# Patient Record
Sex: MALE | Race: WHITE | NOT HISPANIC OR LATINO
[De-identification: names, ages, dates, MRNs, and addresses within clinical notes are randomized per-mention and may not be internally consistent; named-entity substitution may affect disease eponyms.]

---

## 2019-07-31 ENCOUNTER — TRANSCRIPTION ENCOUNTER (OUTPATIENT)
Age: 69
End: 2019-07-31

## 2019-08-01 ENCOUNTER — OUTPATIENT (OUTPATIENT)
Dept: OUTPATIENT SERVICES | Facility: HOSPITAL | Age: 69
LOS: 1 days | Discharge: HOME | End: 2019-08-01
Payer: COMMERCIAL

## 2019-08-01 DIAGNOSIS — D50.9 IRON DEFICIENCY ANEMIA, UNSPECIFIED: ICD-10-CM

## 2019-08-01 PROCEDURE — 45378 DIAGNOSTIC COLONOSCOPY: CPT | Mod: 52

## 2019-08-13 ENCOUNTER — TRANSCRIPTION ENCOUNTER (OUTPATIENT)
Age: 69
End: 2019-08-13

## 2019-08-14 ENCOUNTER — RESULT REVIEW (OUTPATIENT)
Age: 69
End: 2019-08-14

## 2019-08-14 ENCOUNTER — OUTPATIENT (OUTPATIENT)
Dept: OUTPATIENT SERVICES | Facility: HOSPITAL | Age: 69
LOS: 1 days | End: 2019-08-14
Payer: COMMERCIAL

## 2019-08-14 DIAGNOSIS — D50.9 IRON DEFICIENCY ANEMIA, UNSPECIFIED: ICD-10-CM

## 2019-08-14 PROCEDURE — 45380 COLONOSCOPY AND BIOPSY: CPT

## 2019-08-14 PROCEDURE — 88305 TISSUE EXAM BY PATHOLOGIST: CPT

## 2019-08-14 PROCEDURE — 88305 TISSUE EXAM BY PATHOLOGIST: CPT | Mod: 26

## 2019-08-15 ENCOUNTER — TRANSCRIPTION ENCOUNTER (OUTPATIENT)
Age: 69
End: 2019-08-15

## 2019-08-15 LAB — SURGICAL PATHOLOGY STUDY: SIGNIFICANT CHANGE UP

## 2019-09-04 ENCOUNTER — APPOINTMENT (OUTPATIENT)
Dept: COLORECTAL SURGERY | Facility: CLINIC | Age: 69
End: 2019-09-04
Payer: COMMERCIAL

## 2019-09-04 VITALS
HEART RATE: 56 BPM | RESPIRATION RATE: 12 BRPM | BODY MASS INDEX: 26.33 KG/M2 | DIASTOLIC BLOOD PRESSURE: 70 MMHG | HEIGHT: 65 IN | SYSTOLIC BLOOD PRESSURE: 120 MMHG | WEIGHT: 158 LBS

## 2019-09-04 PROCEDURE — 99203 OFFICE O/P NEW LOW 30 MIN: CPT

## 2019-09-05 NOTE — ASSESSMENT
[FreeTextEntry1] : Pleasant 69-year-old gentleman who presents today for consultation regarding management of a newly found small bowel abnormality.\par \par Patient found to be anemic and this prompted the performance of an attempted colonoscopy. The initial attempt revealed a poor prep and was aborted.  After an appropriate prep a complete colonoscopy was performed revealing no abnormalities. Due to persistence of abdominal pain a CAT scan was performed.  A large circumferential napkin ring type lesion of a loop of small bowel was seen. This seems to be abutting and partially obstructing the left ureter. It is almost 7-8 cm in maximal length. No other abnormalities were noted on the CAT scan. Interestingly, the patient had a negative CAT scan performed within one to one and one half years ago.  Also of note, the patient's father had lymphoma and his mother  of gastric cancer at an early age.\par \par In reviewing these images, I am not sure if this small bowel abnormality is resectable especially with the description of left hydronephrosis/hydroureter.\par \par To obtain further information and possible work-up I will refer him to oncology. Perhaps a core needle biopsy can be performed to help with diagnosis. Regardless, further recommendations will be based upon this assessment.

## 2019-09-05 NOTE — HISTORY OF PRESENT ILLNESS
[FreeTextEntry1] : 68yo WM with routine labwork in May 2019 revealing anemia.  Advised to schedule GI consultation.  August 1, 2019 pt underwent colonoscopy, which was aborted at the left colon due to poor prep.  Colonoscopy was repeated on Aug 14.  Multiple rectal biopsies were taken (erythema noted).  Otherwise colonoscopy was normal.  Path of rectal biopsies were consistent with colitis.\par \par Pt had abdominal discomfort following colonoscopy.  Due to history of anemia pt was to undergo further investigation with CT A&P.  This revealed a SB mass suspicious for neoplasm/left hydroureteronephrosis. Pt presents for surgical consultation.\par \par Of note Jan 2018 pt experienced SBO requiring NGT decompression.

## 2019-09-05 NOTE — PHYSICAL EXAM
[Normal Breath Sounds] : Normal breath sounds [Normal Rate and Rhythm] : normal rate and rhythm [Normal Heart Sounds] : normal heart sounds [No Edema] : No edema [Alert] : alert [Oriented to Person] : oriented to person [Oriented to Time] : oriented to time [Oriented to Place] : oriented to place [Calm] : calm [de-identified] : flat soft +BS mild left sided tenderness ND [de-identified] : NC/AT [de-identified] : +ROM [de-identified] : intact

## 2019-09-05 NOTE — REVIEW OF SYSTEMS
[Feeling Tired] : feeling tired [Negative] : Psychiatric [Chest Pain] : no chest pain [Lower Ext Edema] : no extremity edema [Palpitations] : no palpitations [Shortness Of Breath] : no shortness of breath [Easy Bleeding] : no tendency for easy bleeding [Easy Bruising] : no tendency for easy bruising [FreeTextEntry7] : daily BM [FreeTextEntry5] : 2017 last ST

## 2019-09-11 ENCOUNTER — OUTPATIENT (OUTPATIENT)
Dept: OUTPATIENT SERVICES | Facility: HOSPITAL | Age: 69
LOS: 1 days | Discharge: HOME | End: 2019-09-11

## 2019-09-11 DIAGNOSIS — C85.88 OTHER SPECIFIED TYPES OF NON-HODGKIN LYMPHOMA, LYMPH NODES OF MULTIPLE SITES: ICD-10-CM

## 2019-09-11 DIAGNOSIS — Z51.11 ENCOUNTER FOR ANTINEOPLASTIC CHEMOTHERAPY: ICD-10-CM

## 2019-09-11 DIAGNOSIS — R11.2 NAUSEA WITH VOMITING, UNSPECIFIED: ICD-10-CM

## 2019-09-11 DIAGNOSIS — E86.0 DEHYDRATION: ICD-10-CM

## 2019-09-11 DIAGNOSIS — Z51.89 ENCOUNTER FOR OTHER SPECIFIED AFTERCARE: ICD-10-CM

## 2019-09-12 ENCOUNTER — RESULT REVIEW (OUTPATIENT)
Age: 69
End: 2019-09-12

## 2019-09-12 ENCOUNTER — APPOINTMENT (OUTPATIENT)
Dept: HEMATOLOGY ONCOLOGY | Facility: CLINIC | Age: 69
End: 2019-09-12
Payer: COMMERCIAL

## 2019-09-12 VITALS
OXYGEN SATURATION: 100 % | TEMPERATURE: 98.6 F | SYSTOLIC BLOOD PRESSURE: 159 MMHG | BODY MASS INDEX: 26.35 KG/M2 | HEART RATE: 71 BPM | WEIGHT: 154.32 LBS | DIASTOLIC BLOOD PRESSURE: 74 MMHG | RESPIRATION RATE: 16 BRPM | HEIGHT: 64.29 IN

## 2019-09-12 DIAGNOSIS — K57.30 DIVERTICULOSIS OF LARGE INTESTINE W/OUT PERFORATION OR ABSCESS W/OUT BLEEDING: ICD-10-CM

## 2019-09-12 LAB
ALBUMIN SERPL ELPH-MCNC: 4.4 G/DL
ALP BLD-CCNC: 105 U/L
ALT SERPL-CCNC: 19 U/L
ANION GAP SERPL CALC-SCNC: 13 MMOL/L
AST SERPL-CCNC: 17 U/L
B2 MICROGLOB SERPL-MCNC: 2.3 MG/L
BASOPHILS # BLD AUTO: 0.1 K/UL — SIGNIFICANT CHANGE UP (ref 0–0.2)
BASOPHILS NFR BLD AUTO: 0.8 % — SIGNIFICANT CHANGE UP (ref 0–2)
BILIRUB SERPL-MCNC: 0.2 MG/DL
BUN SERPL-MCNC: 21 MG/DL
CALCIUM SERPL-MCNC: 9.7 MG/DL
CHLORIDE SERPL-SCNC: 102 MMOL/L
CO2 SERPL-SCNC: 26 MMOL/L
CREAT SERPL-MCNC: 1.13 MG/DL
EOSINOPHIL # BLD AUTO: 0.3 K/UL — SIGNIFICANT CHANGE UP (ref 0–0.5)
EOSINOPHIL NFR BLD AUTO: 4.9 % — SIGNIFICANT CHANGE UP (ref 0–6)
FERRITIN SERPL-MCNC: 56 NG/ML
GLUCOSE SERPL-MCNC: 91 MG/DL
HCT VFR BLD CALC: 30.2 % — LOW (ref 39–50)
HGB BLD-MCNC: 9.8 G/DL — LOW (ref 13–17)
IRON SATN MFR SERPL: 6 %
IRON SERPL-MCNC: 20 UG/DL
LDH SERPL-CCNC: 175 U/L
LYMPHOCYTES # BLD AUTO: 0.9 K/UL — LOW (ref 1–3.3)
LYMPHOCYTES # BLD AUTO: 12.2 % — LOW (ref 13–44)
MCHC RBC-ENTMCNC: 25.3 PG — LOW (ref 27–34)
MCHC RBC-ENTMCNC: 32.5 G/DL — SIGNIFICANT CHANGE UP (ref 32–36)
MCV RBC AUTO: 77.6 FL — LOW (ref 80–100)
MONOCYTES # BLD AUTO: 0.6 K/UL — SIGNIFICANT CHANGE UP (ref 0–0.9)
MONOCYTES NFR BLD AUTO: 8.6 % — SIGNIFICANT CHANGE UP (ref 2–14)
NEUTROPHILS # BLD AUTO: 5.2 K/UL — SIGNIFICANT CHANGE UP (ref 1.8–7.4)
NEUTROPHILS NFR BLD AUTO: 73.5 % — SIGNIFICANT CHANGE UP (ref 43–77)
PLATELET # BLD AUTO: 348 K/UL — SIGNIFICANT CHANGE UP (ref 150–400)
POTASSIUM SERPL-SCNC: 4.5 MMOL/L
PROT SERPL-MCNC: 6.8 G/DL
RBC # BLD: 3.89 M/UL — LOW (ref 4.2–5.8)
RBC # FLD: 14.4 % — SIGNIFICANT CHANGE UP (ref 10.3–14.5)
RETICS #: 66.2 K/UL — SIGNIFICANT CHANGE UP (ref 25–125)
RETICS/RBC NFR: 1.7 % — SIGNIFICANT CHANGE UP (ref 0.5–2.5)
SODIUM SERPL-SCNC: 141 MMOL/L
TIBC SERPL-MCNC: 342 UG/DL
UIBC SERPL-MCNC: 322 UG/DL
VIT B12 SERPL-MCNC: 633 PG/ML
WBC # BLD: 7.1 K/UL — SIGNIFICANT CHANGE UP (ref 3.8–10.5)
WBC # FLD AUTO: 7.1 K/UL — SIGNIFICANT CHANGE UP (ref 3.8–10.5)

## 2019-09-12 PROCEDURE — 99205 OFFICE O/P NEW HI 60 MIN: CPT

## 2019-09-12 RX ORDER — POLYETHYLENE GLYCOL-3350 AND ELECTROLYTES 236; 6.74; 5.86; 2.97; 22.74 G/274.31G; G/274.31G; G/274.31G; G/274.31G; G/274.31G
236 POWDER, FOR SOLUTION ORAL
Qty: 4000 | Refills: 0 | Status: DISCONTINUED | COMMUNITY
Start: 2019-08-01

## 2019-09-12 NOTE — REVIEW OF SYSTEMS
[Fatigue] : fatigue [Recent Change In Weight] : ~T recent weight change [Negative] : Allergic/Immunologic [Patient Intake Form Reviewed] : Patient intake form was reviewed [Fever] : no fever [Chills] : no chills [Night Sweats] : no night sweats [Abdominal Pain] : no abdominal pain [Vomiting] : no vomiting [Constipation] : no constipation [Diarrhea] : no diarrhea [Dysuria] : no dysuria [FreeTextEntry7] : GERD symptoms decr by omeprazole [FreeTextEntry2] : 164 lb in 6/19, now 156 lb [FreeTextEntry8] : prostate ca as above

## 2019-09-12 NOTE — PHYSICAL EXAM
[Restricted in physically strenuous activity but ambulatory and able to carry out work of a light or sedentary nature] : Status 1- Restricted in physically strenuous activity but ambulatory and able to carry out work of a light or sedentary nature, e.g., light house work, office work [Normal] : affect appropriate [de-identified] : firm mass palp in LLQ, sl tender   [de-identified] : no CVAT

## 2019-09-12 NOTE — HISTORY OF PRESENT ILLNESS
[Disease:__________________________] : Disease: [unfilled] [de-identified] : Mr. Hopson is a 68 yo WM with a small bowel mass and associated mild hydronephrosis.  He presented with anemia in routine labs i(Hgb 11.8) n May, 2019\par He was admitted to [facility] in 1/2018 with abdominal pain and was found to have an SBO.  CT scan showed SBO with transition in RLQ, no significant adenopathy, small amount of ascites. Sxs and SBO resolved with NGT decompression and has not recurred.  Cologard had been (-)  3/2018.  There was lab evidence of iron deficiency.  Colonoscopy 8/1/19 was aborted due to poor prep.  Repeat on August 14, 2019 showed erythema in rectum and diverticular dz, with rectal bxs showing focal active colitis.   He developed abd pain after the recent colonoscopy and repeat CT scan on 8/28/19 showed a new large apple core mass in lower abd/upper pelvis, 8.6 x 6.8 x 6.2 cm with marked nodular circumferential wall thickening.  Mild left hydroureteronephrosis was seen.  There was no LAD or H/S megaly.  Ascites was not noted. \par He continues to have intermittent post-prandial dull discomfort to the left of umbilicus and extending to LLQ.  He has had no melena, hematochezia or change in bowel habits.  He has had no constitutional symptoms other than unintended 8 pound weight loss in the last few weeks.\par He has a h/o ASHD and had a stent placed in the LAD  in 2013.   Prostate cancer was treated in 2008 with [facility] at [facility] in 2008.\par He has had regular urologic f/u's and he reports that his PSA is undetectable.\par He has DANIEL and uses a dental device; he previously responded for a few years after uvulectomy. [de-identified] : initial office visit

## 2019-09-12 NOTE — CONSULT LETTER
[Dear  ___] : Dear  [unfilled], [Consult Letter:] : I had the pleasure of evaluating your patient, [unfilled]. [Please see my note below.] : Please see my note below. [DrAmanda  ___] : Dr. FAUST [DrAmanda ___] : Dr. FAUST [FreeTextEntry2] : Everette Cunha M.D.

## 2019-09-13 LAB
APTT BLD: 31.3 SEC
HBV CORE IGG+IGM SER QL: NONREACTIVE
HBV SURFACE AB SER QL: NONREACTIVE
HBV SURFACE AG SER QL: NONREACTIVE
HCV AB SER QL: NONREACTIVE
HCV S/CO RATIO: 0.28 S/CO
HIV1+2 AB SPEC QL IA.RAPID: NONREACTIVE
IGA SER QL IEP: 95 MG/DL
IGG SER QL IEP: 604 MG/DL
IGM SER QL IEP: 40 MG/DL
INR PPP: 0.97 RATIO
PT BLD: 11 SEC

## 2019-09-15 ENCOUNTER — FORM ENCOUNTER (OUTPATIENT)
Age: 69
End: 2019-09-15

## 2019-09-16 ENCOUNTER — OUTPATIENT (OUTPATIENT)
Dept: OUTPATIENT SERVICES | Facility: HOSPITAL | Age: 69
LOS: 1 days | End: 2019-09-16

## 2019-09-16 ENCOUNTER — APPOINTMENT (OUTPATIENT)
Dept: NUCLEAR MEDICINE | Facility: CLINIC | Age: 69
End: 2019-09-16
Payer: COMMERCIAL

## 2019-09-16 ENCOUNTER — EMERGENCY (EMERGENCY)
Facility: HOSPITAL | Age: 69
LOS: 1 days | Discharge: HOME | End: 2019-09-16
Attending: EMERGENCY MEDICINE
Payer: COMMERCIAL

## 2019-09-16 VITALS
TEMPERATURE: 99 F | HEART RATE: 75 BPM | SYSTOLIC BLOOD PRESSURE: 130 MMHG | HEIGHT: 65 IN | DIASTOLIC BLOOD PRESSURE: 87 MMHG | RESPIRATION RATE: 18 BRPM | WEIGHT: 154.1 LBS | OXYGEN SATURATION: 99 %

## 2019-09-16 DIAGNOSIS — Z00.8 ENCOUNTER FOR OTHER GENERAL EXAMINATION: ICD-10-CM

## 2019-09-16 PROCEDURE — 93010 ELECTROCARDIOGRAM REPORT: CPT

## 2019-09-16 PROCEDURE — 78815 PET IMAGE W/CT SKULL-THIGH: CPT | Mod: 26,PI

## 2019-09-16 PROCEDURE — 99284 EMERGENCY DEPT VISIT MOD MDM: CPT

## 2019-09-16 RX ORDER — SODIUM CHLORIDE 9 MG/ML
1000 INJECTION INTRAMUSCULAR; INTRAVENOUS; SUBCUTANEOUS ONCE
Refills: 0 | Status: COMPLETED | OUTPATIENT
Start: 2019-09-16 | End: 2019-09-16

## 2019-09-16 RX ORDER — ONDANSETRON 8 MG/1
4 TABLET, FILM COATED ORAL ONCE
Refills: 0 | Status: COMPLETED | OUTPATIENT
Start: 2019-09-16 | End: 2019-09-16

## 2019-09-16 NOTE — ED PROVIDER NOTE - PHYSICAL EXAMINATION
General: In no acute distress, comfortable appearing   HEENT: Normocephalic and atraumatic, Trachea midline.   Cardiac: Normal S1 and S2 w/ RRR. No MRG.  Pulmonary: CTA bilaterally. No increased WOB.   Abdominal: Soft, mild TTP in LLQ, ND  Neurologic: No focal sensory or motor deficits.  Musculoskeletal: No limited ROM.  Vascular: Warm and well perfused  Skin: Color appropriate for race.   Psychiatric: Appropriate mood and affect. No apparent risk to self or others.  Shan Hill, PGY-2

## 2019-09-16 NOTE — ED PROVIDER NOTE - CLINICAL SUMMARY MEDICAL DECISION MAKING FREE TEXT BOX
69M recent dx of lymphoma p/w nausea/vomting. At time of examination sx have resolved and pt reports feeling much better. Abdominal pain is reportedly same location quality of known lymphoma for which pt is currently being worked up for. Will obtain basic labs, UA. Pt is afebrile. Will PO chal and d/c if there are no concerning findings on workup

## 2019-09-16 NOTE — ED PROVIDER NOTE - PATIENT PORTAL LINK FT
You can access the FollowMyHealth Patient Portal offered by [facility] by registering at the following website: [email or web address]. By joining [facility]’s FollowMyHealth portal, you will also be able to view your health information using other applications (apps) compatible with our system.

## 2019-09-16 NOTE — ED ADULT TRIAGE NOTE - CHIEF COMPLAINT QUOTE
pt states, 'I have been vomiting today and I had a PET scan done and after coming home I continued to vomit."

## 2019-09-16 NOTE — ED PROVIDER NOTE - ATTENDING CONTRIBUTION TO CARE
68yo male pmh HTN, HLD, CAD, newly diagnosed lymphoma p/w multiple episodes of vomiting, last episode prior to arrival in ED. Greenish emesis, a/w LLQ abdominal pain although unchanged from his baseline pain in LLQ where lymphoma is located as per pt. Had PET scan today, vomiting prior to PET. Denies dysuria, fevers, diarrhea, rash, travel, sick contacts. Not on chemo yet. EKG with PVC, no STEMI. Pt states he feels better in past 2 hours. LLQ minimally tender to deep palpation but pt states that is baseline x months. IVF, labs with lipase. If pt improves and tolerates PO, will discharge with PCP follow up.

## 2019-09-16 NOTE — ED PROVIDER NOTE - NS ED ROS FT
REVIEW OF SYSTEMS:  General:  no fever, no chills  HEENT: no headache, no vision changes  Cardiac: no chest pain, no palpitations  Respiratory: no cough, no shortness of breath  Gastrointestinal: +abdominal pain, +nausea, +vomiting, no diarrhea  Genitourinary: no hematuria, no dysuria, no urinary frequency, no urinary hesitancy   Extremities: no extremity swelling, no extremity pain  Neuro: no focal weakness, no numbness/tingling of the extremities, no decreased sensation  Heme: no easy bleeding, no easy bruising, no anemia  Skin: no abrasions, no jaundice, no pruritis, no rashes, no lesions  -Shan Hill, PGY-2

## 2019-09-16 NOTE — ED PROVIDER NOTE - NSFOLLOWUPINSTRUCTIONS_ED_ALL_ED_FT
Please follow up with your PMD in the next few days. Please return to the ER if you are having worsening abdominal pain, persistent nausea and vomiting, not being able to tolerate fluids.     Please return to the ER with any questions or concerns.

## 2019-09-16 NOTE — ED PROVIDER NOTE - PROGRESS NOTE DETAILS
Labs unremarkable for acute findings. Pt appears much better after hydration and is tolerating PO fluids/food. Will d/c   Shan Hill, PGY-2

## 2019-09-16 NOTE — ED PROVIDER NOTE - OBJECTIVE STATEMENT
69M PMH CAD w/ 1 stent, HTN, HLD, p/w vomiting. Pt has recent diagnosis of lymphoma in august 2019. This morning pt had some moderate-severe, LLQ abdominal pain, associated with bloating and multiple episodes of NBNB vomiting. Normal BMs, w/ some alleviation of pain. Pt states this is similar location of pain that caused his presentation and ultimate diagnosis of lymphoma (mass found in the region). Pt went in for PET scan this afternoon for staging of lymphoma. Following test had more vomiting and came to ER. States he feels much better at time of examination. Denies fevers/chills, CP/SOB Denies abdominal pain at this time

## 2019-09-16 NOTE — ED ADULT NURSE NOTE - OBJECTIVE STATEMENT
70 yo M pmh of HTN, high cholesterol, prostate CA, recent dx of lymphoma unk type, ambulated to ED c/o n/v, and LLQ abdominal pain starting this morning.  Pt had 3 episodes of emesis, nbnb.  Last episode of 21:00 and felt better since then.  Denies CP, back pain, SOB, fevers/chills, diarrhea, lightheadedness, dizziness, changes in urinary or bowel habits. A&OX4, gross neuro intact, VSS.  Abdomen soft, nondistended, nontender.  Skin w/d/i.  safety and comfort maintained.  Wife present at bedside.  Will continue to monitor.

## 2019-09-17 VITALS — TEMPERATURE: 99 F

## 2019-09-17 LAB
ALBUMIN SERPL ELPH-MCNC: 4.1 G/DL — SIGNIFICANT CHANGE UP (ref 3.3–5)
ALP SERPL-CCNC: 109 U/L — SIGNIFICANT CHANGE UP (ref 40–120)
ALT FLD-CCNC: 14 U/L — SIGNIFICANT CHANGE UP (ref 10–45)
ANION GAP SERPL CALC-SCNC: 15 MMOL/L — SIGNIFICANT CHANGE UP (ref 5–17)
APPEARANCE UR: CLEAR — SIGNIFICANT CHANGE UP
AST SERPL-CCNC: 16 U/L — SIGNIFICANT CHANGE UP (ref 10–40)
BACTERIA # UR AUTO: NEGATIVE — SIGNIFICANT CHANGE UP
BASOPHILS # BLD AUTO: 0 K/UL — SIGNIFICANT CHANGE UP (ref 0–0.2)
BASOPHILS NFR BLD AUTO: 0.3 % — SIGNIFICANT CHANGE UP (ref 0–2)
BILIRUB SERPL-MCNC: 0.5 MG/DL — SIGNIFICANT CHANGE UP (ref 0.2–1.2)
BILIRUB UR-MCNC: ABNORMAL
BUN SERPL-MCNC: 26 MG/DL — HIGH (ref 7–23)
CALCIUM SERPL-MCNC: 9.4 MG/DL — SIGNIFICANT CHANGE UP (ref 8.4–10.5)
CHLORIDE SERPL-SCNC: 99 MMOL/L — SIGNIFICANT CHANGE UP (ref 96–108)
CO2 SERPL-SCNC: 24 MMOL/L — SIGNIFICANT CHANGE UP (ref 22–31)
COLOR SPEC: YELLOW — SIGNIFICANT CHANGE UP
CREAT SERPL-MCNC: 1.14 MG/DL — SIGNIFICANT CHANGE UP (ref 0.5–1.3)
CULTURE RESULTS: SIGNIFICANT CHANGE UP
DIFF PNL FLD: NEGATIVE — SIGNIFICANT CHANGE UP
EOSINOPHIL # BLD AUTO: 0 K/UL — SIGNIFICANT CHANGE UP (ref 0–0.5)
EOSINOPHIL NFR BLD AUTO: 0.5 % — SIGNIFICANT CHANGE UP (ref 0–6)
EPI CELLS # UR: 0 /HPF — SIGNIFICANT CHANGE UP
GLUCOSE SERPL-MCNC: 116 MG/DL — HIGH (ref 70–99)
GLUCOSE UR QL: NEGATIVE — SIGNIFICANT CHANGE UP
HCT VFR BLD CALC: 31.1 % — LOW (ref 39–50)
HGB BLD-MCNC: 10.1 G/DL — LOW (ref 13–17)
HYALINE CASTS # UR AUTO: 1 /LPF — SIGNIFICANT CHANGE UP (ref 0–2)
KETONES UR-MCNC: ABNORMAL
LEUKOCYTE ESTERASE UR-ACNC: NEGATIVE — SIGNIFICANT CHANGE UP
LIDOCAIN IGE QN: 19 U/L — SIGNIFICANT CHANGE UP (ref 7–60)
LYMPHOCYTES # BLD AUTO: 0.5 K/UL — LOW (ref 1–3.3)
LYMPHOCYTES # BLD AUTO: 6.8 % — LOW (ref 13–44)
MCHC RBC-ENTMCNC: 25.1 PG — LOW (ref 27–34)
MCHC RBC-ENTMCNC: 32.4 GM/DL — SIGNIFICANT CHANGE UP (ref 32–36)
MCV RBC AUTO: 77.7 FL — LOW (ref 80–100)
MONOCYTES # BLD AUTO: 0.7 K/UL — SIGNIFICANT CHANGE UP (ref 0–0.9)
MONOCYTES NFR BLD AUTO: 9.7 % — SIGNIFICANT CHANGE UP (ref 2–14)
NEUTROPHILS # BLD AUTO: 5.8 K/UL — SIGNIFICANT CHANGE UP (ref 1.8–7.4)
NEUTROPHILS NFR BLD AUTO: 82.6 % — HIGH (ref 43–77)
NITRITE UR-MCNC: NEGATIVE — SIGNIFICANT CHANGE UP
PH UR: 5.5 — SIGNIFICANT CHANGE UP (ref 5–8)
PLATELET # BLD AUTO: 384 K/UL — SIGNIFICANT CHANGE UP (ref 150–400)
POTASSIUM SERPL-MCNC: 3.9 MMOL/L — SIGNIFICANT CHANGE UP (ref 3.5–5.3)
POTASSIUM SERPL-SCNC: 3.9 MMOL/L — SIGNIFICANT CHANGE UP (ref 3.5–5.3)
PROT SERPL-MCNC: 6.9 G/DL — SIGNIFICANT CHANGE UP (ref 6–8.3)
PROT UR-MCNC: ABNORMAL
RBC # BLD: 4 M/UL — LOW (ref 4.2–5.8)
RBC # FLD: 14.7 % — HIGH (ref 10.3–14.5)
RBC CASTS # UR COMP ASSIST: 1 /HPF — SIGNIFICANT CHANGE UP (ref 0–4)
SODIUM SERPL-SCNC: 138 MMOL/L — SIGNIFICANT CHANGE UP (ref 135–145)
SP GR SPEC: 1.03 — HIGH (ref 1.01–1.02)
SPECIMEN SOURCE: SIGNIFICANT CHANGE UP
UROBILINOGEN FLD QL: NEGATIVE — SIGNIFICANT CHANGE UP
WBC # BLD: 7 K/UL — SIGNIFICANT CHANGE UP (ref 3.8–10.5)
WBC # FLD AUTO: 7 K/UL — SIGNIFICANT CHANGE UP (ref 3.8–10.5)
WBC UR QL: 1 /HPF — SIGNIFICANT CHANGE UP (ref 0–5)

## 2019-09-17 PROCEDURE — 81001 URINALYSIS AUTO W/SCOPE: CPT

## 2019-09-17 PROCEDURE — 80053 COMPREHEN METABOLIC PANEL: CPT

## 2019-09-17 PROCEDURE — 99283 EMERGENCY DEPT VISIT LOW MDM: CPT

## 2019-09-17 PROCEDURE — 83690 ASSAY OF LIPASE: CPT

## 2019-09-17 PROCEDURE — 85027 COMPLETE CBC AUTOMATED: CPT

## 2019-09-17 PROCEDURE — 87086 URINE CULTURE/COLONY COUNT: CPT

## 2019-09-17 PROCEDURE — 93005 ELECTROCARDIOGRAM TRACING: CPT

## 2019-09-17 RX ADMIN — SODIUM CHLORIDE 1000 MILLILITER(S): 9 INJECTION INTRAMUSCULAR; INTRAVENOUS; SUBCUTANEOUS at 00:09

## 2019-09-17 NOTE — ED ADULT NURSE REASSESSMENT NOTE - NS ED NURSE REASSESS COMMENT FT1
pt PO challenged as per MD Hernandez request.  Pt given ginger ale and crackers.  Safety and comfort maintained.  Will continue to monitor.

## 2019-09-19 LAB
ALBUMIN MFR SERPL ELPH: 57 %
ALBUMIN SERPL-MCNC: 3.9 G/DL
ALBUMIN/GLOB SERPL: 1.3 RATIO
ALPHA1 GLOB MFR SERPL ELPH: 7.1 %
ALPHA1 GLOB SERPL ELPH-MCNC: 0.5 G/DL
ALPHA2 GLOB MFR SERPL ELPH: 14.7 %
ALPHA2 GLOB SERPL ELPH-MCNC: 1 G/DL
B-GLOBULIN MFR SERPL ELPH: 12.7 %
B-GLOBULIN SERPL ELPH-MCNC: 0.9 G/DL
GAMMA GLOB FLD ELPH-MCNC: 0.6 G/DL
GAMMA GLOB MFR SERPL ELPH: 8.5 %
INTERPRETATION SERPL IEP-IMP: NORMAL
PROT SERPL-MCNC: 6.8 G/DL
PROT SERPL-MCNC: 6.8 G/DL

## 2019-09-22 ENCOUNTER — FORM ENCOUNTER (OUTPATIENT)
Age: 69
End: 2019-09-22

## 2019-09-23 ENCOUNTER — RESULT REVIEW (OUTPATIENT)
Age: 69
End: 2019-09-23

## 2019-09-23 ENCOUNTER — APPOINTMENT (OUTPATIENT)
Dept: ULTRASOUND IMAGING | Facility: IMAGING CENTER | Age: 69
End: 2019-09-23
Payer: COMMERCIAL

## 2019-09-23 ENCOUNTER — OUTPATIENT (OUTPATIENT)
Dept: OUTPATIENT SERVICES | Facility: HOSPITAL | Age: 69
LOS: 1 days | End: 2019-09-23
Payer: COMMERCIAL

## 2019-09-23 DIAGNOSIS — K63.9 DISEASE OF INTESTINE, UNSPECIFIED: ICD-10-CM

## 2019-09-23 PROCEDURE — 88305 TISSUE EXAM BY PATHOLOGIST: CPT

## 2019-09-23 PROCEDURE — 88365 INSITU HYBRIDIZATION (FISH): CPT | Mod: 26

## 2019-09-23 PROCEDURE — 88342 IMHCHEM/IMCYTCHM 1ST ANTB: CPT

## 2019-09-23 PROCEDURE — 88360 TUMOR IMMUNOHISTOCHEM/MANUAL: CPT | Mod: 26

## 2019-09-23 PROCEDURE — 88341 IMHCHEM/IMCYTCHM EA ADD ANTB: CPT

## 2019-09-23 PROCEDURE — 88305 TISSUE EXAM BY PATHOLOGIST: CPT | Mod: 26

## 2019-09-23 PROCEDURE — 20206 BIOPSY MUSCLE PERQ NEEDLE: CPT

## 2019-09-23 PROCEDURE — 76942 ECHO GUIDE FOR BIOPSY: CPT | Mod: 26

## 2019-09-23 PROCEDURE — 88341 IMHCHEM/IMCYTCHM EA ADD ANTB: CPT | Mod: 26,59

## 2019-09-23 PROCEDURE — 76942 ECHO GUIDE FOR BIOPSY: CPT

## 2019-09-23 PROCEDURE — 88360 TUMOR IMMUNOHISTOCHEM/MANUAL: CPT

## 2019-09-23 PROCEDURE — 88342 IMHCHEM/IMCYTCHM 1ST ANTB: CPT | Mod: 26,59

## 2019-09-23 PROCEDURE — 88365 INSITU HYBRIDIZATION (FISH): CPT

## 2019-09-27 LAB — SURGICAL PATHOLOGY STUDY: SIGNIFICANT CHANGE UP

## 2019-09-30 ENCOUNTER — OTHER (OUTPATIENT)
Age: 69
End: 2019-09-30

## 2019-10-01 ENCOUNTER — OUTPATIENT (OUTPATIENT)
Dept: OUTPATIENT SERVICES | Facility: HOSPITAL | Age: 69
LOS: 1 days | End: 2019-10-01
Payer: COMMERCIAL

## 2019-10-01 ENCOUNTER — FORM ENCOUNTER (OUTPATIENT)
Age: 69
End: 2019-10-01

## 2019-10-01 DIAGNOSIS — C85.88 OTHER SPECIFIED TYPES OF NON-HODGKIN LYMPHOMA, LYMPH NODES OF MULTIPLE SITES: ICD-10-CM

## 2019-10-02 ENCOUNTER — RESULT REVIEW (OUTPATIENT)
Age: 69
End: 2019-10-02

## 2019-10-02 ENCOUNTER — OUTPATIENT (OUTPATIENT)
Dept: OUTPATIENT SERVICES | Facility: HOSPITAL | Age: 69
LOS: 1 days | End: 2019-10-02
Payer: COMMERCIAL

## 2019-10-02 ENCOUNTER — APPOINTMENT (OUTPATIENT)
Dept: NUCLEAR MEDICINE | Facility: IMAGING CENTER | Age: 69
End: 2019-10-02
Payer: COMMERCIAL

## 2019-10-02 ENCOUNTER — APPOINTMENT (OUTPATIENT)
Dept: HEMATOLOGY ONCOLOGY | Facility: CLINIC | Age: 69
End: 2019-10-02
Payer: COMMERCIAL

## 2019-10-02 VITALS
BODY MASS INDEX: 25.88 KG/M2 | HEART RATE: 54 BPM | DIASTOLIC BLOOD PRESSURE: 79 MMHG | WEIGHT: 152.12 LBS | RESPIRATION RATE: 16 BRPM | OXYGEN SATURATION: 98 % | TEMPERATURE: 98.3 F | SYSTOLIC BLOOD PRESSURE: 164 MMHG

## 2019-10-02 DIAGNOSIS — C83.30 DIFFUSE LARGE B-CELL LYMPHOMA, UNSPECIFIED SITE: ICD-10-CM

## 2019-10-02 LAB
BASOPHILS # BLD AUTO: 0 K/UL — SIGNIFICANT CHANGE UP (ref 0–0.2)
BASOPHILS NFR BLD AUTO: 0.3 % — SIGNIFICANT CHANGE UP (ref 0–2)
EOSINOPHIL # BLD AUTO: 0.3 K/UL — SIGNIFICANT CHANGE UP (ref 0–0.5)
EOSINOPHIL NFR BLD AUTO: 3.7 % — SIGNIFICANT CHANGE UP (ref 0–6)
HCT VFR BLD CALC: 32.2 % — LOW (ref 39–50)
HGB BLD-MCNC: 10.3 G/DL — LOW (ref 13–17)
LYMPHOCYTES # BLD AUTO: 0.9 K/UL — LOW (ref 1–3.3)
LYMPHOCYTES # BLD AUTO: 12.9 % — LOW (ref 13–44)
MCHC RBC-ENTMCNC: 25 PG — LOW (ref 27–34)
MCHC RBC-ENTMCNC: 32.1 G/DL — SIGNIFICANT CHANGE UP (ref 32–36)
MCV RBC AUTO: 78 FL — LOW (ref 80–100)
MONOCYTES # BLD AUTO: 0.7 K/UL — SIGNIFICANT CHANGE UP (ref 0–0.9)
MONOCYTES NFR BLD AUTO: 10.3 % — SIGNIFICANT CHANGE UP (ref 2–14)
NEUTROPHILS # BLD AUTO: 5.2 K/UL — SIGNIFICANT CHANGE UP (ref 1.8–7.4)
NEUTROPHILS NFR BLD AUTO: 72.8 % — SIGNIFICANT CHANGE UP (ref 43–77)
PLATELET # BLD AUTO: 394 K/UL — SIGNIFICANT CHANGE UP (ref 150–400)
RBC # BLD: 4.12 M/UL — LOW (ref 4.2–5.8)
RBC # FLD: 15.7 % — HIGH (ref 10.3–14.5)
WBC # BLD: 7.2 K/UL — SIGNIFICANT CHANGE UP (ref 3.8–10.5)
WBC # FLD AUTO: 7.2 K/UL — SIGNIFICANT CHANGE UP (ref 3.8–10.5)

## 2019-10-02 PROCEDURE — 88305 TISSUE EXAM BY PATHOLOGIST: CPT | Mod: 26

## 2019-10-02 PROCEDURE — 88313 SPECIAL STAINS GROUP 2: CPT

## 2019-10-02 PROCEDURE — 78472 GATED HEART PLANAR SINGLE: CPT

## 2019-10-02 PROCEDURE — 88313 SPECIAL STAINS GROUP 2: CPT | Mod: 26

## 2019-10-02 PROCEDURE — 88184 FLOWCYTOMETRY/ TC 1 MARKER: CPT

## 2019-10-02 PROCEDURE — 88189 FLOWCYTOMETRY/READ 16 & >: CPT

## 2019-10-02 PROCEDURE — 85097 BONE MARROW INTERPRETATION: CPT

## 2019-10-02 PROCEDURE — 88285 CHROMOSOME COUNT ADDITIONAL: CPT

## 2019-10-02 PROCEDURE — 38222 DX BONE MARROW BX & ASPIR: CPT | Mod: RT

## 2019-10-02 PROCEDURE — 88185 FLOWCYTOMETRY/TC ADD-ON: CPT

## 2019-10-02 PROCEDURE — 87205 SMEAR GRAM STAIN: CPT

## 2019-10-02 PROCEDURE — 88264 CHROMOSOME ANALYSIS 20-25: CPT

## 2019-10-02 PROCEDURE — 78472 GATED HEART PLANAR SINGLE: CPT | Mod: 26

## 2019-10-02 PROCEDURE — 88280 CHROMOSOME KARYOTYPE STUDY: CPT

## 2019-10-02 PROCEDURE — A9538: CPT

## 2019-10-02 PROCEDURE — 88305 TISSUE EXAM BY PATHOLOGIST: CPT

## 2019-10-02 PROCEDURE — 88237 TISSUE CULTURE BONE MARROW: CPT

## 2019-10-02 RX ORDER — OMEPRAZOLE 40 MG/1
40 CAPSULE, DELAYED RELEASE ORAL
Refills: 0 | Status: ACTIVE | COMMUNITY

## 2019-10-02 RX ORDER — LOSARTAN POTASSIUM 100 MG/1
100 TABLET, FILM COATED ORAL DAILY
Refills: 0 | Status: ACTIVE | COMMUNITY

## 2019-10-02 RX ORDER — ATORVASTATIN CALCIUM 40 MG/1
40 TABLET, FILM COATED ORAL DAILY
Refills: 0 | Status: ACTIVE | COMMUNITY

## 2019-10-02 RX ORDER — ASPIRIN 81 MG
81 TABLET, DELAYED RELEASE (ENTERIC COATED) ORAL DAILY
Refills: 0 | Status: ACTIVE | COMMUNITY

## 2019-10-02 RX ORDER — METOPROLOL SUCCINATE 50 MG/1
50 TABLET, EXTENDED RELEASE ORAL DAILY
Refills: 0 | Status: ACTIVE | COMMUNITY

## 2019-10-04 LAB
HEMATOPATHOLOGY REPORT: SIGNIFICANT CHANGE UP
TM INTERPRETATION: SIGNIFICANT CHANGE UP

## 2019-10-06 ENCOUNTER — RESULT REVIEW (OUTPATIENT)
Age: 69
End: 2019-10-06

## 2019-10-09 ENCOUNTER — RX RENEWAL (OUTPATIENT)
Age: 69
End: 2019-10-09

## 2019-10-10 ENCOUNTER — LABORATORY RESULT (OUTPATIENT)
Age: 69
End: 2019-10-10

## 2019-10-10 ENCOUNTER — RESULT REVIEW (OUTPATIENT)
Age: 69
End: 2019-10-10

## 2019-10-10 ENCOUNTER — APPOINTMENT (OUTPATIENT)
Dept: INFUSION THERAPY | Facility: HOSPITAL | Age: 69
End: 2019-10-10

## 2019-10-10 LAB
BASOPHILS # BLD AUTO: 0 K/UL — SIGNIFICANT CHANGE UP (ref 0–0.2)
BASOPHILS NFR BLD AUTO: 0.5 % — SIGNIFICANT CHANGE UP (ref 0–2)
EOSINOPHIL # BLD AUTO: 0.3 K/UL — SIGNIFICANT CHANGE UP (ref 0–0.5)
EOSINOPHIL NFR BLD AUTO: 4.6 % — SIGNIFICANT CHANGE UP (ref 0–6)
HCT VFR BLD CALC: 31.1 % — LOW (ref 39–50)
HGB BLD-MCNC: 9.2 G/DL — LOW (ref 13–17)
LYMPHOCYTES # BLD AUTO: 0.7 K/UL — LOW (ref 1–3.3)
LYMPHOCYTES # BLD AUTO: 10.1 % — LOW (ref 13–44)
MCHC RBC-ENTMCNC: 22.9 PG — LOW (ref 27–34)
MCHC RBC-ENTMCNC: 29.7 G/DL — LOW (ref 32–36)
MCV RBC AUTO: 77.1 FL — LOW (ref 80–100)
MONOCYTES # BLD AUTO: 0.9 K/UL — SIGNIFICANT CHANGE UP (ref 0–0.9)
MONOCYTES NFR BLD AUTO: 12.1 % — SIGNIFICANT CHANGE UP (ref 2–14)
NEUTROPHILS # BLD AUTO: 5.3 K/UL — SIGNIFICANT CHANGE UP (ref 1.8–7.4)
NEUTROPHILS NFR BLD AUTO: 72.6 % — SIGNIFICANT CHANGE UP (ref 43–77)
PLATELET # BLD AUTO: 343 K/UL — SIGNIFICANT CHANGE UP (ref 150–400)
RBC # BLD: 4.03 M/UL — LOW (ref 4.2–5.8)
RBC # FLD: 14.6 % — HIGH (ref 10.3–14.5)
WBC # BLD: 7.2 K/UL — SIGNIFICANT CHANGE UP (ref 3.8–10.5)
WBC # FLD AUTO: 7.2 K/UL — SIGNIFICANT CHANGE UP (ref 3.8–10.5)

## 2019-10-12 ENCOUNTER — OUTPATIENT (OUTPATIENT)
Dept: OUTPATIENT SERVICES | Facility: HOSPITAL | Age: 69
LOS: 1 days | Discharge: HOME | End: 2019-10-12

## 2019-10-12 DIAGNOSIS — C85.88 OTHER SPECIFIED TYPES OF NON-HODGKIN LYMPHOMA, LYMPH NODES OF MULTIPLE SITES: ICD-10-CM

## 2019-10-14 ENCOUNTER — APPOINTMENT (OUTPATIENT)
Dept: INFUSION THERAPY | Facility: HOSPITAL | Age: 69
End: 2019-10-14

## 2019-10-14 ENCOUNTER — LABORATORY RESULT (OUTPATIENT)
Age: 69
End: 2019-10-14

## 2019-10-14 LAB — CHROM ANALY OVERALL INTERP SPEC-IMP: SIGNIFICANT CHANGE UP

## 2019-10-15 DIAGNOSIS — D50.9 IRON DEFICIENCY ANEMIA, UNSPECIFIED: ICD-10-CM

## 2019-10-17 ENCOUNTER — APPOINTMENT (OUTPATIENT)
Dept: HEMATOLOGY ONCOLOGY | Facility: CLINIC | Age: 69
End: 2019-10-17
Payer: COMMERCIAL

## 2019-10-17 ENCOUNTER — APPOINTMENT (OUTPATIENT)
Dept: INFUSION THERAPY | Facility: HOSPITAL | Age: 69
End: 2019-10-17

## 2019-10-17 ENCOUNTER — RESULT REVIEW (OUTPATIENT)
Age: 69
End: 2019-10-17

## 2019-10-17 ENCOUNTER — INPATIENT (INPATIENT)
Facility: HOSPITAL | Age: 69
LOS: 2 days | Discharge: HOME | DRG: 871 | End: 2019-10-20
Attending: HOSPITALIST | Admitting: STUDENT IN AN ORGANIZED HEALTH CARE EDUCATION/TRAINING PROGRAM
Payer: COMMERCIAL

## 2019-10-17 VITALS
TEMPERATURE: 102 F | WEIGHT: 149.91 LBS | OXYGEN SATURATION: 97 % | HEART RATE: 92 BPM | HEIGHT: 64 IN | RESPIRATION RATE: 16 BRPM | SYSTOLIC BLOOD PRESSURE: 149 MMHG | DIASTOLIC BLOOD PRESSURE: 75 MMHG

## 2019-10-17 VITALS
RESPIRATION RATE: 17 BRPM | OXYGEN SATURATION: 99 % | TEMPERATURE: 99.2 F | DIASTOLIC BLOOD PRESSURE: 75 MMHG | WEIGHT: 151.02 LBS | BODY MASS INDEX: 25.69 KG/M2 | HEART RATE: 56 BPM | SYSTOLIC BLOOD PRESSURE: 118 MMHG

## 2019-10-17 DIAGNOSIS — D70.9 NEUTROPENIA, UNSPECIFIED: ICD-10-CM

## 2019-10-17 LAB
ALBUMIN SERPL ELPH-MCNC: 3.2 G/DL — LOW (ref 3.3–5)
ALBUMIN SERPL ELPH-MCNC: 3.9 G/DL — SIGNIFICANT CHANGE UP (ref 3.3–5)
ALP SERPL-CCNC: 106 U/L — SIGNIFICANT CHANGE UP (ref 40–120)
ALP SERPL-CCNC: 86 U/L — SIGNIFICANT CHANGE UP (ref 40–120)
ALT FLD-CCNC: 10 U/L — SIGNIFICANT CHANGE UP (ref 10–45)
ALT FLD-CCNC: 12 U/L — SIGNIFICANT CHANGE UP (ref 10–45)
ANION GAP SERPL CALC-SCNC: 14 MMOL/L — SIGNIFICANT CHANGE UP (ref 5–17)
ANION GAP SERPL CALC-SCNC: 15 MMOL/L — SIGNIFICANT CHANGE UP (ref 5–17)
APPEARANCE UR: CLEAR — SIGNIFICANT CHANGE UP
APTT BLD: 27.8 SEC
APTT BLD: 27.9 SEC — SIGNIFICANT CHANGE UP (ref 27.5–36.3)
AST SERPL-CCNC: 6 U/L — LOW (ref 10–40)
AST SERPL-CCNC: 7 U/L — LOW (ref 10–40)
BILIRUB SERPL-MCNC: 0.5 MG/DL — SIGNIFICANT CHANGE UP (ref 0.2–1.2)
BILIRUB SERPL-MCNC: 0.6 MG/DL — SIGNIFICANT CHANGE UP (ref 0.2–1.2)
BILIRUB UR-MCNC: NEGATIVE — SIGNIFICANT CHANGE UP
BUN SERPL-MCNC: 16 MG/DL — SIGNIFICANT CHANGE UP (ref 7–23)
BUN SERPL-MCNC: 17 MG/DL — SIGNIFICANT CHANGE UP (ref 7–23)
CALCIUM SERPL-MCNC: 8.4 MG/DL — SIGNIFICANT CHANGE UP (ref 8.4–10.5)
CALCIUM SERPL-MCNC: 9.1 MG/DL — SIGNIFICANT CHANGE UP (ref 8.4–10.5)
CHLORIDE SERPL-SCNC: 96 MMOL/L — SIGNIFICANT CHANGE UP (ref 96–108)
CHLORIDE SERPL-SCNC: 96 MMOL/L — SIGNIFICANT CHANGE UP (ref 96–108)
CO2 SERPL-SCNC: 22 MMOL/L — SIGNIFICANT CHANGE UP (ref 22–31)
CO2 SERPL-SCNC: 24 MMOL/L — SIGNIFICANT CHANGE UP (ref 22–31)
COLOR SPEC: SIGNIFICANT CHANGE UP
CREAT SERPL-MCNC: 0.94 MG/DL — SIGNIFICANT CHANGE UP (ref 0.5–1.3)
CREAT SERPL-MCNC: 1.09 MG/DL — SIGNIFICANT CHANGE UP (ref 0.5–1.3)
DIFF PNL FLD: NEGATIVE — SIGNIFICANT CHANGE UP
GAS PNL BLDV: SIGNIFICANT CHANGE UP
GLUCOSE SERPL-MCNC: 106 MG/DL — HIGH (ref 70–99)
GLUCOSE SERPL-MCNC: 126 MG/DL — HIGH (ref 70–99)
GLUCOSE UR QL: NEGATIVE — SIGNIFICANT CHANGE UP
HCT VFR BLD CALC: 25.1 % — LOW (ref 39–50)
HCT VFR BLD CALC: 31.5 % — LOW (ref 39–50)
HGB BLD-MCNC: 8.2 G/DL — LOW (ref 13–17)
HGB BLD-MCNC: 9.8 G/DL — LOW (ref 13–17)
INR BLD: 1.25 RATIO — HIGH (ref 0.88–1.16)
INR PPP: 1.16 RATIO
KETONES UR-MCNC: NEGATIVE — SIGNIFICANT CHANGE UP
LDH SERPL L TO P-CCNC: 121 U/L — SIGNIFICANT CHANGE UP (ref 50–242)
LEUKOCYTE ESTERASE UR-ACNC: NEGATIVE — SIGNIFICANT CHANGE UP
MCHC RBC-ENTMCNC: 24 PG — LOW (ref 27–34)
MCHC RBC-ENTMCNC: 24.2 PG — LOW (ref 27–34)
MCHC RBC-ENTMCNC: 31.1 G/DL — LOW (ref 32–36)
MCHC RBC-ENTMCNC: 32.7 GM/DL — SIGNIFICANT CHANGE UP (ref 32–36)
MCV RBC AUTO: 73.4 FL — LOW (ref 80–100)
MCV RBC AUTO: 77.9 FL — LOW (ref 80–100)
NITRITE UR-MCNC: NEGATIVE — SIGNIFICANT CHANGE UP
PH UR: 6.5 — SIGNIFICANT CHANGE UP (ref 5–8)
PLATELET # BLD AUTO: 143 K/UL — LOW (ref 150–400)
PLATELET # BLD AUTO: 153 K/UL — SIGNIFICANT CHANGE UP (ref 150–400)
POTASSIUM SERPL-MCNC: 4.1 MMOL/L — SIGNIFICANT CHANGE UP (ref 3.5–5.3)
POTASSIUM SERPL-MCNC: 4.5 MMOL/L — SIGNIFICANT CHANGE UP (ref 3.5–5.3)
POTASSIUM SERPL-SCNC: 4.1 MMOL/L — SIGNIFICANT CHANGE UP (ref 3.5–5.3)
POTASSIUM SERPL-SCNC: 4.5 MMOL/L — SIGNIFICANT CHANGE UP (ref 3.5–5.3)
PROT SERPL-MCNC: 5.7 G/DL — LOW (ref 6–8.3)
PROT SERPL-MCNC: 6.1 G/DL — SIGNIFICANT CHANGE UP (ref 6–8.3)
PROT UR-MCNC: SIGNIFICANT CHANGE UP
PROTHROM AB SERPL-ACNC: 14.4 SEC — HIGH (ref 10–12.9)
PT BLD: 13.3 SEC
RAPID RVP RESULT: DETECTED
RBC # BLD: 3.42 M/UL — LOW (ref 4.2–5.8)
RBC # BLD: 4.04 M/UL — LOW (ref 4.2–5.8)
RBC # FLD: 15.5 % — HIGH (ref 10.3–14.5)
RBC # FLD: 15.8 % — HIGH (ref 10.3–14.5)
RV+EV RNA SPEC QL NAA+PROBE: DETECTED
SODIUM SERPL-SCNC: 132 MMOL/L — LOW (ref 135–145)
SODIUM SERPL-SCNC: 135 MMOL/L — SIGNIFICANT CHANGE UP (ref 135–145)
SP GR SPEC: 1.02 — SIGNIFICANT CHANGE UP (ref 1.01–1.02)
UROBILINOGEN FLD QL: NEGATIVE — SIGNIFICANT CHANGE UP
WBC # BLD: 0.2 K/UL — CRITICAL LOW (ref 3.8–10.5)
WBC # BLD: 0.3 K/UL — CRITICAL LOW (ref 3.8–10.5)
WBC # FLD AUTO: 0.2 K/UL — CRITICAL LOW (ref 3.8–10.5)
WBC # FLD AUTO: 0.3 K/UL — CRITICAL LOW (ref 3.8–10.5)

## 2019-10-17 PROCEDURE — 99223 1ST HOSP IP/OBS HIGH 75: CPT | Mod: GC

## 2019-10-17 PROCEDURE — 71045 X-RAY EXAM CHEST 1 VIEW: CPT | Mod: 26

## 2019-10-17 PROCEDURE — 99215 OFFICE O/P EST HI 40 MIN: CPT

## 2019-10-17 PROCEDURE — 99285 EMERGENCY DEPT VISIT HI MDM: CPT

## 2019-10-17 RX ORDER — METOPROLOL TARTRATE 50 MG
50 TABLET ORAL
Refills: 0 | Status: DISCONTINUED | OUTPATIENT
Start: 2019-10-17 | End: 2019-10-20

## 2019-10-17 RX ORDER — ALLOPURINOL 300 MG
300 TABLET ORAL DAILY
Refills: 0 | Status: DISCONTINUED | OUTPATIENT
Start: 2019-10-17 | End: 2019-10-20

## 2019-10-17 RX ORDER — PSYLLIUM HUSK 0.4 G
CAPSULE ORAL
Refills: 0 | Status: DISCONTINUED | COMMUNITY
End: 2019-10-17

## 2019-10-17 RX ORDER — IBUPROFEN 200 MG
600 TABLET ORAL ONCE
Refills: 0 | Status: COMPLETED | OUTPATIENT
Start: 2019-10-17 | End: 2019-10-17

## 2019-10-17 RX ORDER — ATORVASTATIN CALCIUM 80 MG/1
40 TABLET, FILM COATED ORAL AT BEDTIME
Refills: 0 | Status: DISCONTINUED | OUTPATIENT
Start: 2019-10-17 | End: 2019-10-20

## 2019-10-17 RX ORDER — PANTOPRAZOLE SODIUM 20 MG/1
40 TABLET, DELAYED RELEASE ORAL
Refills: 0 | Status: DISCONTINUED | OUTPATIENT
Start: 2019-10-17 | End: 2019-10-20

## 2019-10-17 RX ORDER — LOSARTAN POTASSIUM 100 MG/1
100 TABLET, FILM COATED ORAL DAILY
Refills: 0 | Status: DISCONTINUED | OUTPATIENT
Start: 2019-10-17 | End: 2019-10-18

## 2019-10-17 RX ORDER — METOCLOPRAMIDE HCL 10 MG
5 TABLET ORAL
Refills: 0 | Status: DISCONTINUED | OUTPATIENT
Start: 2019-10-17 | End: 2019-10-20

## 2019-10-17 RX ORDER — ASPIRIN/CALCIUM CARB/MAGNESIUM 324 MG
81 TABLET ORAL DAILY
Refills: 0 | Status: DISCONTINUED | OUTPATIENT
Start: 2019-10-17 | End: 2019-10-20

## 2019-10-17 RX ORDER — FERROUS SULFATE 325(65) MG
324 TABLET ORAL
Refills: 0 | Status: COMPLETED | COMMUNITY
End: 2019-10-17

## 2019-10-17 RX ORDER — FERROUS SULFATE 325(65) MG
325 (65 FE) TABLET ORAL
Refills: 0 | Status: COMPLETED | COMMUNITY
End: 2019-10-17

## 2019-10-17 RX ORDER — CHLORHEXIDINE GLUCONATE 4 %
325 (65 FE) LIQUID (ML) TOPICAL
Refills: 0 | Status: COMPLETED | COMMUNITY
End: 2019-10-17

## 2019-10-17 RX ORDER — ACETAMINOPHEN 500 MG
650 TABLET ORAL ONCE
Refills: 0 | Status: COMPLETED | OUTPATIENT
Start: 2019-10-17 | End: 2019-10-17

## 2019-10-17 RX ORDER — LEVOTHYROXINE SODIUM 125 MCG
100 TABLET ORAL DAILY
Refills: 0 | Status: DISCONTINUED | OUTPATIENT
Start: 2019-10-17 | End: 2019-10-20

## 2019-10-17 RX ORDER — CEFEPIME 1 G/1
2000 INJECTION, POWDER, FOR SOLUTION INTRAMUSCULAR; INTRAVENOUS ONCE
Refills: 0 | Status: COMPLETED | OUTPATIENT
Start: 2019-10-17 | End: 2019-10-17

## 2019-10-17 RX ORDER — SODIUM CHLORIDE 9 MG/ML
2100 INJECTION INTRAMUSCULAR; INTRAVENOUS; SUBCUTANEOUS ONCE
Refills: 0 | Status: COMPLETED | OUTPATIENT
Start: 2019-10-17 | End: 2019-10-17

## 2019-10-17 RX ORDER — VANCOMYCIN HCL 1 G
1000 VIAL (EA) INTRAVENOUS ONCE
Refills: 0 | Status: COMPLETED | OUTPATIENT
Start: 2019-10-17 | End: 2019-10-17

## 2019-10-17 RX ORDER — INFLUENZA VIRUS VACCINE 15; 15; 15; 15 UG/.5ML; UG/.5ML; UG/.5ML; UG/.5ML
0.5 SUSPENSION INTRAMUSCULAR ONCE
Refills: 0 | Status: DISCONTINUED | OUTPATIENT
Start: 2019-10-17 | End: 2019-10-20

## 2019-10-17 RX ADMIN — Medication 650 MILLIGRAM(S): at 17:36

## 2019-10-17 RX ADMIN — SODIUM CHLORIDE 2100 MILLILITER(S): 9 INJECTION INTRAMUSCULAR; INTRAVENOUS; SUBCUTANEOUS at 17:36

## 2019-10-17 RX ADMIN — Medication 600 MILLIGRAM(S): at 21:52

## 2019-10-17 RX ADMIN — CEFEPIME 100 MILLIGRAM(S): 1 INJECTION, POWDER, FOR SOLUTION INTRAMUSCULAR; INTRAVENOUS at 18:11

## 2019-10-17 RX ADMIN — Medication 250 MILLIGRAM(S): at 19:01

## 2019-10-17 NOTE — ED PROVIDER NOTE - OBJECTIVE STATEMENT
Pt is a 70 y/o male with PMH HTN, HLD, CAD s/p stent, hypothyroidism, h/o prostate CA (treated), newly dx B-cell lymphoma (as of 9/2019), first chemo session 8 days ago, who is sent in from [facility] for fever. Per patient, he developed a fever and generalized malaise last night, As=573. Today, went to [facility] for a follow-up and was noted to be febrile. Given "1 bag" IVF and 1 dose 750mg PO levofloxacin, and sent to ED for further eval. Pt had an iron infusion 3 days ago. Pt denies: chest pain, SOB, cough, pleuritic chest pain, abdominal pain, n/v/d, back pain, neck pain, HA, neck stiffness, focal numbness or weakness, visual changes, dizziness, lightheadedness, leg pain/swelling, recent travel, recent trauma, recent immobilization, dysuria, hematuria, rash.   Hematologist: Dr. Kingston

## 2019-10-17 NOTE — ED ADULT NURSE NOTE - OBJECTIVE STATEMENT
69Y m aaox4 ambulatory from [facility], present to ED c/o fever. pt states that is in chemotherapy for Lymphoma, first session was last week and Iron treatment last Monday, yesterday he started w/ fever and chills, , pt went this morning to the [facility] , Tx w/ Levaquin and 1Lt of Normal saline. denies CP, sob, abdominal pain,  issues, no numbness, N/V/D.

## 2019-10-17 NOTE — H&P ADULT - PROBLEM SELECTOR PLAN 2
RVP positive for enterorhinovirus.  - continue with supportive care  - IV fluids and encourage po intake  - acetaminophen prn for fevers

## 2019-10-17 NOTE — CHART NOTE - NSCHARTNOTEFT_GEN_A_CORE
Elizabeth Lynne PGY-3  MAR  Dept. Internal Medicine    TO BE COMPLETED WITHIN 6 HOURS OF INITIAL ASSESSMENT:    For use in patients that have 2 sepsis criteria and new organ dysfunction   •	New or increased oxygen requirement  •	Creatinine >2mg/dL  •	Bilirubin>2mg/dL  •	Platelet <100,00/mm3  •	INR >1.5, PTT>60  •	Lactate >2    If patient persistent hypotension (SBP<90) or any lactate >4 then provider evaluation (Physician/PA/NP) within 30 minutes of bolus completion is required.    Vital Signs Last 24 Hrs  T(C): 38.1 (17 Oct 2019 19:04), Max: 39.4 (17 Oct 2019 17:50)  T(F): 100.5 (17 Oct 2019 19:04), Max: 103 (17 Oct 2019 17:50)  HR: 82 (17 Oct 2019 19:04) (82 - 92)  BP: 112/67 (17 Oct 2019 19:04) (110/68 - 149/75)  BP(mean): --  RR: 18 (17 Oct 2019 19:04) (16 - 18)  SpO2: 97% (17 Oct 2019 19:04) (96% - 97%)  		  LUNGS:  [ x ]Clear bilaterally [  ] Wheeze [  ] Rhonchi [  ] Rales [  ] Crackles; Other:  HEART: [ x ]RRR [  ] No murmur[  ]  Normal S1S2[  ] Tachycardia;  Other:  CAPILLARY REFILL:  	Fingers: [ x ] less than 2 seconds [  ] more than 2 seconds                                           Toes: [ x ]  less than 2 seconds [  ] more than 2 seconds   PERIPHERAL PULSES:  Radial: [ x ] Palpable  [  ]  non-palpable                                         Dorsalis Pedis: [ x ] Palpable  [  ] non-palpable                                         Posterior Tibial: [ x ] Palpable  [  ] non-palpable                                          Other:  SKIN:   [  ]  Diaphoretic  [  ]  mottling  [  ]  Cold extremities  [ x ]  Warm [  ]  Dry                      Other:    BEDSIDE ULTRASOUND FINDINGS (IF APPLICABLE):    Labs:  17 Oct 2019 17:33    132    |  96     |  16     ----------------------------<  106    4.1     |  22     |  0.94     Ca    8.4        17 Oct 2019 17:33    TPro  5.7    /  Alb  3.2    /  TBili  0.5    /  DBili  x      /  AST  6      /  ALT  10     /  AlkPhos  86     17 Oct 2019 17:33                          8.2    0.20  )-----------( 143      ( 17 Oct 2019 17:33 )             25.1     PT/INR - ( 17 Oct 2019 17:33 )   PT: 14.4 sec;   INR: 1.25 ratio         PTT - ( 17 Oct 2019 17:33 )  PTT:27.9 sec  Lactate:    Plan (orders must be placed in EMR):     [  ]  Check Repeat Lactate   [ x ]  No change in current plan  [  ]  Start Vasopressors:  [  ]  Repeat Fluid Bolus:  [  ] other:    Care Discussed with Consultants/Other Providers [ x ] YES  [ ] NO

## 2019-10-17 NOTE — H&P ADULT - PROBLEM SELECTOR PLAN 1
The patient was admitted with a fever up to 103F in the setting of recent chemotherapy, pancytopenia, and enterorhinovirus infection. s/p 2g cefepime, 1g vancomycin, 2.1L NS bolus, 650 mg acetaminophen, and 600 mg ibuprofen.  - follow up BCX and UCX  - continue with IV fluids  - continue cefepime 2g q8h and levaquin 750 mg daily  - monitor vitals  - trend CBC daily The patient was admitted with a fever up to 103F in the setting of recent chemotherapy, pancytopenia, and enterorhinovirus infection. s/p 2g cefepime, 1g vancomycin, 2.1L NS bolus, 650 mg acetaminophen, and 600 mg ibuprofen.  - follow up BCX and UCX  - continue with IV fluids  - continue cefepime 2g q8h   - monitor vitals  - trend CBC daily

## 2019-10-17 NOTE — H&P ADULT - PROBLEM SELECTOR PLAN 6
On losartan 100 mg daily at home.  - blood pressure stable  - will hold losartan and resume in AM as blood pressure permits

## 2019-10-17 NOTE — H&P ADULT - NSHPPHYSICALEXAM_GEN_ALL_CORE
Vital Signs Last 24 Hrs  T(C): 37.7 (17 Oct 2019 22:15), Max: 39.4 (17 Oct 2019 17:50)  T(F): 99.9 (17 Oct 2019 22:15), Max: 103 (17 Oct 2019 17:50)  HR: 88 (17 Oct 2019 22:15) (82 - 92)  BP: 146/72 (17 Oct 2019 22:15) (110/68 - 150/72)  BP(mean): --  RR: 18 (17 Oct 2019 22:15) (16 - 18)  SpO2: 95% (17 Oct 2019 22:15) (95% - 97%)    General: Well-nourished, well-developed, not in acute distress  Head: Normocephalic, Atraumatic  Eyes: PERRLA, EOMI, normal sclera  Throat: Dry mucous membranes  Respiratory: CTAB, normal respiratory effort, no wheezes, crackles, rales  CV: RRR, S1/S2, no murmurs, rubs or gallops  Abdominal: Soft, bowel sounds present, NT, ND, left-sided hard, nontender, circular mass palpated  Extremities: No edema, 2+ DP pulses  Neurological: A&Ox4, MAEx4, non-focal  Skin: No rashes

## 2019-10-17 NOTE — H&P ADULT - NSHPLABSRESULTS_GEN_ALL_CORE
LABS:                          8.2    0.20  )-----------( 143      ( 17 Oct 2019 17:33 )             25.1     Hb Trend: 8.2<--, 9.8<--  WBC Trend: 0.20<--, 0.3<--  Plt Trend: 143<--, 153<--          10-17    132<L>  |  96  |  16  ----------------------------<  106<H>  4.1   |  22  |  0.94    Ca    8.4      17 Oct 2019 17:33    TPro  5.7<L>  /  Alb  3.2<L>  /  TBili  0.5  /  DBili  x   /  AST  6<L>  /  ALT  10  /  AlkPhos  86  10-17      PT/INR - ( 17 Oct 2019 17:33 )   PT: 14.4 sec;   INR: 1.25 ratio         PTT - ( 17 Oct 2019 17:33 )  PTT:27.9 sec  Urinalysis Basic - ( 17 Oct 2019 18:33 )    Color: Light Yellow / Appearance: Clear / S.019 / pH: x  Gluc: x / Ketone: Negative  / Bili: Negative / Urobili: Negative   Blood: x / Protein: Trace / Nitrite: Negative   Leuk Esterase: Negative / RBC: x / WBC x   Sq Epi: x / Non Sq Epi: x / Bacteria: x      CAPILLARY BLOOD GLUCOSE      IMAGING:  < from: Xray Chest 1 View AP/PA (10.17.19 @ 18:45) >      ******PRELIMINARY REPORT******    ******PRELIMINARY REPORT******          EXAM:  XR CHEST AP OR PA 1V                            PROCEDURE DATE:  10/17/2019      ******PRELIMINARY REPORT******    ******PRELIMINARY REPORT******              INTERPRETATION:  Clear lungs.    < end of copied text > LABS:                        8.2    0.20  )-----------( 143      ( 17 Oct 2019 17:33 )             25.1     Hb Trend: 8.2<--, 9.8<--  WBC Trend: 0.20<--, 0.3<--  Plt Trend: 143<--, 153<--    10-17    132<L>  |  96  |  16  ----------------------------<  106<H>  4.1   |  22  |  0.94    Ca    8.4      17 Oct 2019 17:33    TPro  5.7<L>  /  Alb  3.2<L>  /  TBili  0.5  /  DBili  x   /  AST  6<L>  /  ALT  10  /  AlkPhos  86  10-17    PT/INR - ( 17 Oct 2019 17:33 )   PT: 14.4 sec;   INR: 1.25 ratio      PTT - ( 17 Oct 2019 17:33 )  PTT:27.9 sec  Urinalysis Basic - ( 17 Oct 2019 18:33 )    Color: Light Yellow / Appearance: Clear / S.019 / pH: x  Gluc: x / Ketone: Negative  / Bili: Negative / Urobili: Negative   Blood: x / Protein: Trace / Nitrite: Negative   Leuk Esterase: Negative / RBC: x / WBC x   Sq Epi: x / Non Sq Epi: x / Bacteria: x    CAPILLARY BLOOD GLUCOSE    IMAGING:  < from: Xray Chest 1 View AP/PA (10.17.19 @ 18:45) >    ******PRELIMINARY REPORT******    ******PRELIMINARY REPORT******          EXAM:  XR CHEST AP OR PA 1V                          PROCEDURE DATE:  10/17/2019      ******PRELIMINARY REPORT******    ******PRELIMINARY REPORT******          INTERPRETATION:  Clear lungs.  < end of copied text >    I have reviewed the labs, imaging and ekg.

## 2019-10-17 NOTE — H&P ADULT - NSICDXPASTMEDICALHX_GEN_ALL_CORE_FT
PAST MEDICAL HISTORY:  CAD (coronary artery disease) s/p stent    HTN (hypertension)     Hypercholesteremia     Hypothyroidism     Lymphoma     Prostate cancer     Sleep apnea

## 2019-10-17 NOTE — ASSESSMENT
[Palliative Care Plan] : not applicable at this time [FreeTextEntry1] : DLBCL, GCB type non-DH. \par S/p R-CHOP, cycle 1 day 8. Neutropenic fever. Presented with small bowel mass and symptoms of SBO which have improved. He also has Fe deficiency anemia and has received one dose of Ferraheme and is due for a second does on 10/21/2019.\par Admit to [facility]. Repeat cultures as indicated. IV antibiotics. Mouth care. Neutropenic precautions.\par Received Onpro on 10/10/2019.

## 2019-10-17 NOTE — PHYSICAL EXAM
[Restricted in physically strenuous activity but ambulatory and able to carry out work of a light or sedentary nature] : Status 1- Restricted in physically strenuous activity but ambulatory and able to carry out work of a light or sedentary nature, e.g., light house work, office work [Normal] : affect appropriate [de-identified] : no CVAT [de-identified] : firm mass palp in LLQ, sl tender

## 2019-10-17 NOTE — H&P ADULT - PROBLEM SELECTOR PLAN 3
Patient recently treated with chemotherapy. WBC 0.2, Hgb 8.2, and platelets 143.  - CBC from ED without differential; will re-order CBC with diff  - patient without active signs of bleeding   - continue to trend  - will consult Oncology; follow up recs

## 2019-10-17 NOTE — ED PROVIDER NOTE - PROGRESS NOTE DETAILS
SHAISTA Harris MD Reassessment Note: Repeat vital signs noted.  Patient evaluated after fluid administration. Lungs clear, heart RRR, skin exam with capillary refill < 2 seconds and lower extremities warm with no edema and dorsalis pedis pulses 2+ bilaterally.  Patient responding well to resuscitation. SHAISTA Harris MD: Attempted to page hematology fellows (day and nighttime) on call #s several times, no response over 1.5 hr of calling. Called hematology attg, left msg, no answer. Will continue to attempt to reach someone from hematology so that pt may be admitted to hospitalist service. SHAISTA Harris MD: Rec'd callback from heme onc fellow who agrees with plan to c/w pan-culturing pt, broad spectrum abx and admission. He spoke with hospitalist Dr. Houston.

## 2019-10-17 NOTE — H&P ADULT - HISTORY OF PRESENT ILLNESS
69 year old male with HTN, HLD, CAD s/p stent, hypothyroidism, h/o prostate CA (treated), DANIEL (not on CPAP), newly diagnosed B-cell lymphoma (as of 9/2019) on RCHOP, presents with neutropenic fever. The patient received his first chemotherapy session 8 days ago with [facility]. The patient felt well after treatment without any subjective complaints. He receive an infusion of IV iron on 10/14 and is scheduled for his next dose on Monday. Last night he developed a fever of 101F. He went to [facility] the next morning to see his Oncologist. They gave him IVFs and a dose of levofloxacin 750 mg. His fever did not subside so he was sent to the ED.  He denies sick contacts, recent travel, recent illnesses, new medications, chest pain, SOB, cough, abdominal pain, back pain, diarrhea, dysuria, hematuria, or rash. His urine has been orange and he has reduced appetite.'    In the ED, vitals were T100.5F-103F, HR 82-92, -150/66-75 mm Hg, RR 16-18 bpm, 96-97% RA.  He received 2g cefepime, 1g vancomycin, 2.1L NS bolus, 650 mg acetaminophen, and 600 mg ibuprofen.  BCX and UCX were sent.

## 2019-10-17 NOTE — ADDENDUM
[FreeTextEntry1] : Documented by Amarjit Holly acting as a scribe for Dr. Adi Kingston on 10/17/2019\par \par All medical record entries made by a scribe were at my, Dr. Adi Kingston direction and personally dictated by me on 10/17/2019 . I have reviewed the chart and agree that the record accurately reflects my personal performance of the history, physical exam, procedure and imaging.\par

## 2019-10-17 NOTE — H&P ADULT - ASSESSMENT
69 year old male with HTN, HLD, CAD s/p stent, hypothyroidism, h/o prostate CA (treated), DANIEL (not on CPAP), newly diagnosed B-cell lymphoma (as of 9/2019) on RCHOP, presents with one-day history of neutropenic fever, also positive for enterorhinovirus admitted for further management.

## 2019-10-17 NOTE — ED CLERICAL - NS ED CLERK NOTE PRE-ARRIVAL INFORMATION; ADDITIONAL PRE-ARRIVAL INFORMATION
CC/Reason For referral: B- Cell Lymphoma fevers, Neutropenic, febrile at 102 during hydration fatigued  Preferred Consultant(if applicable): none  Who admits for you (if needed): Vashti  Do you have documents you would like to fax over? no   Would you still like to speak to an ED attending?  [phone]

## 2019-10-17 NOTE — HISTORY OF PRESENT ILLNESS
[Disease:__________________________] : Disease: [unfilled] [de-identified] : Mr. Hopson is a 68 yo WM with a small bowel mass and associated mild hydronephrosis.  He presented with anemia in routine labs i(Hgb 11.8) n May, 2019\par He was admitted to [facility] in 1/2018 with abdominal pain and was found to have an SBO.  CT scan showed SBO with transition in RLQ, no significant adenopathy, small amount of ascites. Sxs and SBO resolved with NGT decompression and has not recurred.  Cologard had been (-)  3/2018.  There was lab evidence of iron deficiency.  Colonoscopy 8/1/19 was aborted due to poor prep.  Repeat on August 14, 2019 showed erythema in rectum and diverticular dz, with rectal bxs showing focal active colitis.   He developed abd pain after the recent colonoscopy and repeat CT scan on 8/28/19 showed a new large apple core mass in lower abd/upper pelvis, 8.6 x 6.8 x 6.2 cm with marked nodular circumferential wall thickening.  Mild left hydroureteronephrosis was seen.  There was no LAD or H/S megaly.  Ascites was not noted. \par He continues to have intermittent post-prandial dull discomfort to the left of umbilicus and extending to LLQ.  He has had no melena, hematochezia or change in bowel habits.  He has had no constitutional symptoms other than unintended 8 pound weight loss in the last few weeks.\par He has a h/o ASHD and had a stent placed in the LAD  in 2013.   Prostate cancer was treated in 2008 with Cyberknife at [facility] in 2008.\par He has had regular urologic f/u's and he reports that his PSA is undetectable.\par He has DANIEL and uses a dental device; he previously responded for a few years after uvulectomy. [de-identified] : Pathology review completed after core bx of mass. He has DLBCL, of GCB type. He does not have DH DLBCL.\par \par Pre treatment PET/CT from 9/16/19 showed \par 1. FDG avid circumferential mass involving small bowel, probably jejunum, suspicious for \par malignancy. \par 2. Increased distention of the stomach and small bowel loops proximal to the mass with distally \par decompressed bowel and "small bowel feces sign," new since CT August 28, 2019 suggestive of \par small bowel obstruction. \par 3. FDG avid difficult to delineate focus along the pleural surface of the left hemidiaphragm, \par suspicious for metastatic focus above the diaphragm. \par 4. Multiple FDG avid peritoneal foci, suspicious for metastatic disease.\par \par Resting MUGA scan on 10/2/19 was normal, with a LVEF of 61%. \par He  began treatment with R-CHOP on 10/10/2019. Therapy was initially well tolerated except for fatigue. SOB related sxs have improved. He is eating reasonably well.\par Pretherapy LDH was 344 and has fallen to 121.\par Last night he had a temp of 101 and chills. He has had no localizing c/o. He has been afebrile since this am but still has periodic chills. He was cultured in the office and was given Levaquin 750mg and was then sent to the treatment room for IVFs. There he spiked to 102 and was sent to the [facility] ER.\par Today CBC shows WBC 0.3, Hgb 9.8, Plt 153K, and MCV 77.9 fl.

## 2019-10-17 NOTE — H&P ADULT - ATTENDING COMMENTS
I have reviewed the labs, imaging and ekg. I have edited the above note as appropriate and agree with above.  69M w/ HTN, HLD, CAD s/p stent, hypothyroidism, h/o prostate CA (treated), DANIEL (not on CPAP), newly diagnosed B-cell lymphoma (as of 9/2019) on RCHOP, presents with one-day history of neutropenic fever, also positive for entero-rhinovirus admitted for further management  -Cont. IV cefepime for now  -Gentle IVF overnight  -F/u BCx  -Oncology consult in AM  -Hold anti-hypertensives as needed

## 2019-10-17 NOTE — H&P ADULT - NSHPREVIEWOFSYSTEMS_GEN_ALL_CORE
REVIEW OF SYSTEMS:  CONSTITUTIONAL: SEE HPI  EYES: No eye pain, visual disturbances, or discharge  ENT:  No difficulty hearing, tinnitus, vertigo; No sinus or throat pain  NECK: No pain or stiffness  RESPIRATORY: No cough, wheezing, or hemoptysis; No shortness of breath  CARDIOVASCULAR: No chest pain, palpitations, dizziness, or leg swelling  GASTROINTESTINAL: No abdominal or epigastric pain. No nausea, vomiting, or hematemesis  GENITOURINARY: No dysuria, frequency, hematuria, or incontinence  NEUROLOGICAL: No headaches, memory loss, loss of strength, numbness, or tremors  SKIN: No itching, burning, rashes, or lesions   LYMPH NODES: No enlarged glands  ENDOCRINE: No hot or cold intolerance; No hair loss  MUSCULOSKELETAL: No joint pain or swelling; No muscle, back, or extremity pain  PSYCHIATRIC: No depression, anxiety, mood swings, or difficulty sleeping  HEME/LYMPH: No easy bruising, or bleeding gums  ALLERGY AND IMMUNOLOGIC: No hives or eczema

## 2019-10-17 NOTE — ED ADULT TRIAGE NOTE - CHIEF COMPLAINT QUOTE
fever chills from [facility] for b cell lymphoma, started chemo last week

## 2019-10-17 NOTE — ED PROVIDER NOTE - CLINICAL SUMMARY MEDICAL DECISION MAKING FREE TEXT BOX
SHAISTA Harris MD: 68 y/o male with BCL on chemo (8 days ago), noted to be neutropenic from labs today p/w neutropenic  fever. Ddx includes, however, is not limited to: uti, pna, bacteremia, viral syndrome, influenza, other. Plan: basic labs, bcx, vbg, rvp, u/a, ucx, cxr, IVF, empiric IV abx, apap, TBA

## 2019-10-17 NOTE — REVIEW OF SYSTEMS
[Patient Intake Form Reviewed] : Patient intake form was reviewed [Fever] : fever [Chills] : chills [Fatigue] : fatigue [Recent Change In Weight] : ~T recent weight change [Negative] : Heme/Lymph [Night Sweats] : no night sweats [Vomiting] : no vomiting [Abdominal Pain] : no abdominal pain [Constipation] : no constipation [Diarrhea] : no diarrhea [Dysuria] : no dysuria [FreeTextEntry7] : GERD symptoms decr by omeprazole [FreeTextEntry2] : 164 lb in 6/19, now 156 lb [FreeTextEntry8] : prostate ca as above

## 2019-10-17 NOTE — H&P ADULT - PROBLEM SELECTOR PLAN 4
- newly diagnosed B-cell lymphoma (as of 9/2019) on RCHOP  - sees Dr. Kingston at [facility]  - Oncology Consult; follow up recs - newly diagnosed B-cell lymphoma (as of 9/2019) on RCHOP  - sees Dr. Kingston at [facility]  - Oncology Consult; follow up recs  - will check tumor lysis labs in the AM

## 2019-10-18 DIAGNOSIS — B34.1 ENTEROVIRUS INFECTION, UNSPECIFIED: ICD-10-CM

## 2019-10-18 DIAGNOSIS — E78.5 HYPERLIPIDEMIA, UNSPECIFIED: ICD-10-CM

## 2019-10-18 DIAGNOSIS — I10 ESSENTIAL (PRIMARY) HYPERTENSION: ICD-10-CM

## 2019-10-18 DIAGNOSIS — C83.33 DIFFUSE LARGE B-CELL LYMPHOMA, INTRA-ABDOMINAL LYMPH NODES: ICD-10-CM

## 2019-10-18 DIAGNOSIS — D61.810 ANTINEOPLASTIC CHEMOTHERAPY INDUCED PANCYTOPENIA: ICD-10-CM

## 2019-10-18 DIAGNOSIS — G47.33 OBSTRUCTIVE SLEEP APNEA (ADULT) (PEDIATRIC): ICD-10-CM

## 2019-10-18 DIAGNOSIS — Z29.9 ENCOUNTER FOR PROPHYLACTIC MEASURES, UNSPECIFIED: ICD-10-CM

## 2019-10-18 DIAGNOSIS — D70.9 NEUTROPENIA, UNSPECIFIED: ICD-10-CM

## 2019-10-18 DIAGNOSIS — A41.9 SEPSIS, UNSPECIFIED ORGANISM: ICD-10-CM

## 2019-10-18 DIAGNOSIS — I25.10 ATHEROSCLEROTIC HEART DISEASE OF NATIVE CORONARY ARTERY WITHOUT ANGINA PECTORIS: ICD-10-CM

## 2019-10-18 DIAGNOSIS — E03.9 HYPOTHYROIDISM, UNSPECIFIED: ICD-10-CM

## 2019-10-18 LAB
ALBUMIN SERPL ELPH-MCNC: 2.9 G/DL — LOW (ref 3.3–5)
ALP SERPL-CCNC: 76 U/L — SIGNIFICANT CHANGE UP (ref 40–120)
ALT FLD-CCNC: 10 U/L — SIGNIFICANT CHANGE UP (ref 10–45)
ANION GAP SERPL CALC-SCNC: 13 MMOL/L — SIGNIFICANT CHANGE UP (ref 5–17)
AST SERPL-CCNC: 7 U/L — LOW (ref 10–40)
BILIRUB SERPL-MCNC: 0.4 MG/DL — SIGNIFICANT CHANGE UP (ref 0.2–1.2)
BUN SERPL-MCNC: 16 MG/DL — SIGNIFICANT CHANGE UP (ref 7–23)
CALCIUM SERPL-MCNC: 8.2 MG/DL — LOW (ref 8.4–10.5)
CHLORIDE SERPL-SCNC: 99 MMOL/L — SIGNIFICANT CHANGE UP (ref 96–108)
CO2 SERPL-SCNC: 23 MMOL/L — SIGNIFICANT CHANGE UP (ref 22–31)
CREAT SERPL-MCNC: 0.9 MG/DL — SIGNIFICANT CHANGE UP (ref 0.5–1.3)
CULTURE RESULTS: NO GROWTH — SIGNIFICANT CHANGE UP
GLUCOSE SERPL-MCNC: 110 MG/DL — HIGH (ref 70–99)
HAPTOGLOB SERPL-MCNC: 235 MG/DL — HIGH (ref 34–200)
HCT VFR BLD CALC: 23.1 % — LOW (ref 39–50)
HGB BLD-MCNC: 7.4 G/DL — LOW (ref 13–17)
LDH SERPL L TO P-CCNC: 111 U/L — SIGNIFICANT CHANGE UP (ref 50–242)
MAGNESIUM SERPL-MCNC: 1.6 MG/DL — SIGNIFICANT CHANGE UP (ref 1.6–2.6)
MCHC RBC-ENTMCNC: 23.3 PG — LOW (ref 27–34)
MCHC RBC-ENTMCNC: 32 GM/DL — SIGNIFICANT CHANGE UP (ref 32–36)
MCV RBC AUTO: 72.9 FL — LOW (ref 80–100)
NRBC # BLD: 0 /100 WBCS — SIGNIFICANT CHANGE UP (ref 0–0)
PHOSPHATE SERPL-MCNC: 1.9 MG/DL — LOW (ref 2.5–4.5)
PLATELET # BLD AUTO: 122 K/UL — LOW (ref 150–400)
POTASSIUM SERPL-MCNC: 3.8 MMOL/L — SIGNIFICANT CHANGE UP (ref 3.5–5.3)
POTASSIUM SERPL-SCNC: 3.8 MMOL/L — SIGNIFICANT CHANGE UP (ref 3.5–5.3)
PROT SERPL-MCNC: 5.4 G/DL — LOW (ref 6–8.3)
RBC # BLD: 3.17 M/UL — LOW (ref 4.2–5.8)
RBC # FLD: 15.8 % — HIGH (ref 10.3–14.5)
SODIUM SERPL-SCNC: 135 MMOL/L — SIGNIFICANT CHANGE UP (ref 135–145)
SPECIMEN SOURCE: SIGNIFICANT CHANGE UP
URATE SERPL-MCNC: 3.1 MG/DL — LOW (ref 3.4–8.8)
WBC # BLD: 0.3 K/UL — CRITICAL LOW (ref 3.8–10.5)
WBC # FLD AUTO: 0.3 K/UL — CRITICAL LOW (ref 3.8–10.5)

## 2019-10-18 PROCEDURE — 99222 1ST HOSP IP/OBS MODERATE 55: CPT | Mod: GC

## 2019-10-18 PROCEDURE — 99233 SBSQ HOSP IP/OBS HIGH 50: CPT | Mod: GC

## 2019-10-18 RX ORDER — SODIUM CHLORIDE 9 MG/ML
1000 INJECTION INTRAMUSCULAR; INTRAVENOUS; SUBCUTANEOUS
Refills: 0 | Status: DISCONTINUED | OUTPATIENT
Start: 2019-10-18 | End: 2019-10-18

## 2019-10-18 RX ORDER — LOSARTAN POTASSIUM 100 MG/1
50 TABLET, FILM COATED ORAL DAILY
Refills: 0 | Status: DISCONTINUED | OUTPATIENT
Start: 2019-10-18 | End: 2019-10-18

## 2019-10-18 RX ORDER — ACETAMINOPHEN 500 MG
650 TABLET ORAL EVERY 6 HOURS
Refills: 0 | Status: DISCONTINUED | OUTPATIENT
Start: 2019-10-18 | End: 2019-10-20

## 2019-10-18 RX ORDER — SODIUM,POTASSIUM PHOSPHATES 278-250MG
1 POWDER IN PACKET (EA) ORAL
Refills: 0 | Status: COMPLETED | OUTPATIENT
Start: 2019-10-18 | End: 2019-10-19

## 2019-10-18 RX ORDER — SODIUM CHLORIDE 9 MG/ML
1000 INJECTION INTRAMUSCULAR; INTRAVENOUS; SUBCUTANEOUS
Refills: 0 | Status: DISCONTINUED | OUTPATIENT
Start: 2019-10-18 | End: 2019-10-19

## 2019-10-18 RX ORDER — CEFEPIME 1 G/1
2000 INJECTION, POWDER, FOR SOLUTION INTRAMUSCULAR; INTRAVENOUS EVERY 8 HOURS
Refills: 0 | Status: DISCONTINUED | OUTPATIENT
Start: 2019-10-18 | End: 2019-10-20

## 2019-10-18 RX ADMIN — ATORVASTATIN CALCIUM 40 MILLIGRAM(S): 80 TABLET, FILM COATED ORAL at 21:46

## 2019-10-18 RX ADMIN — Medication 50 MILLIGRAM(S): at 05:48

## 2019-10-18 RX ADMIN — PANTOPRAZOLE SODIUM 40 MILLIGRAM(S): 20 TABLET, DELAYED RELEASE ORAL at 05:48

## 2019-10-18 RX ADMIN — Medication 81 MILLIGRAM(S): at 11:20

## 2019-10-18 RX ADMIN — Medication 50 MILLIGRAM(S): at 17:21

## 2019-10-18 RX ADMIN — Medication 1 PACKET(S): at 17:22

## 2019-10-18 RX ADMIN — Medication 100 MICROGRAM(S): at 05:48

## 2019-10-18 RX ADMIN — CEFEPIME 100 MILLIGRAM(S): 1 INJECTION, POWDER, FOR SOLUTION INTRAMUSCULAR; INTRAVENOUS at 02:08

## 2019-10-18 RX ADMIN — Medication 300 MILLIGRAM(S): at 11:21

## 2019-10-18 RX ADMIN — SODIUM CHLORIDE 75 MILLILITER(S): 9 INJECTION INTRAMUSCULAR; INTRAVENOUS; SUBCUTANEOUS at 00:44

## 2019-10-18 RX ADMIN — SODIUM CHLORIDE 60 MILLILITER(S): 9 INJECTION INTRAMUSCULAR; INTRAVENOUS; SUBCUTANEOUS at 18:34

## 2019-10-18 RX ADMIN — CEFEPIME 100 MILLIGRAM(S): 1 INJECTION, POWDER, FOR SOLUTION INTRAMUSCULAR; INTRAVENOUS at 11:21

## 2019-10-18 RX ADMIN — Medication 650 MILLIGRAM(S): at 05:48

## 2019-10-18 RX ADMIN — Medication 650 MILLIGRAM(S): at 04:54

## 2019-10-18 RX ADMIN — CEFEPIME 100 MILLIGRAM(S): 1 INJECTION, POWDER, FOR SOLUTION INTRAMUSCULAR; INTRAVENOUS at 17:22

## 2019-10-18 NOTE — PROGRESS NOTE ADULT - ASSESSMENT
69 year old male with HTN, HLD, CAD s/p stent, hypothyroidism, h/o prostate CA (treated), DANIEL (not on CPAP), newly diagnosed B-cell lymphoma (as of 9/2019) on RCHOP, presents with one-day history of neutropenic fever, also positive for enterorhinovirus admitted for further management. 69 year old male with HTN, HLD, CAD s/p stent, hypothyroidism, h/o prostate CA (treated), DANIEL (not on CPAP), newly diagnosed B-cell lymphoma (as of 9/2019) on RCHOP, presents with one-day history of neutropenic sepsis, positive for enterorhinovirus admitted for further management.

## 2019-10-18 NOTE — CONSULT NOTE ADULT - ASSESSMENT
In summary this is a 69 year old male with newly diagnosed GCB type diffuse large B-cell lymphoma (as of 9/2019) on R-CHOP since 10/10, rectal colitis in 8/2019, HTN, HLD, CAD s/p stent, hypothyroidism, h/o prostate CA (treated), DANIEL (not on CPAP), admitted for neutropenic fever. Found to be enterorhinovirus positive. WBC 0.3 (baseline 7), Hb of 7.4 (baseline 10.3),  (baseline 343), , hapto 235, negative UA. CXR reported clear.    Impression:  #Neutropenic fever  - currently on Cefepime  - BCx, UCx pending    Recommendations:  - pending d/w attending In summary this is a 69 year old male with newly diagnosed GCB type diffuse large B-cell lymphoma (as of 9/2019) on R-CHOP since 10/10, rectal colitis in 8/2019, HTN, HLD, CAD s/p stent, hypothyroidism, h/o prostate CA (treated), DANIEL (not on CPAP), admitted for neutropenic fever. Found to be enterorhinovirus positive. WBC 0.3 (baseline 7), Hb of 7.4 (baseline 10.3),  (baseline 343), , hapto 235, negative UA. CXR reported clear.      #Neutropenic fever, s/p chemo for DLBCL  - Last chemo: 10/10/19, s/p Peg filgrastim  - Currently on Cefepime, would continue for now at least till ANC >500.  - BCx, UCx pending; f/u the result, rest of the management with IV fluid+ supportive care for viral infection/sepsis per primary team.  - Monitor CBC with differential daily  - Primary hematologist: Dr. Kingston.  - Hematology will continue to follow while inpatient.

## 2019-10-18 NOTE — PROGRESS NOTE ADULT - PROBLEM SELECTOR PLAN 2
RVP positive for enterorhinovirus.  - continue with supportive care  - IV fluids and encourage po intake  - acetaminophen prn for fevers RVP positive for enterorhinovirus. CXR no infiltrate,  - continue with supportive care  - acetaminophen prn for fevers

## 2019-10-18 NOTE — PROGRESS NOTE ADULT - PROBLEM SELECTOR PLAN 1
The patient was admitted with a fever up to 103F in the setting of recent chemotherapy, pancytopenia, and enterorhinovirus infection. s/p 2g cefepime, 1g vancomycin, 2.1L NS bolus, 650 mg acetaminophen, and 600 mg ibuprofen.  - follow up BCX and UCX  - continue with IV fluids  - continue cefepime 2g q8h   - monitor vitals  - trend CBC daily fever on presentation, no focal symptoms, probably due to +enterorhinovirus infection, no clear sign of bacterial infection thus far  -c/w cefepime 2 gram q8 given neutropenia  -f/u bcx, urine cx  -s/p vanc in ER but no signs of MRSA infection, hold for now  -trend CBC/ANC daily  -CXR and UA wnl  -HD stable, lactate wnl

## 2019-10-18 NOTE — PROGRESS NOTE ADULT - PROBLEM SELECTOR PLAN 10
- DVT: SCDs in setting of pancytopenia  - Diet: DASH/TLC - DVT: SCDs, ambulatory, PT eval  - Diet: DASH/TLC  Dispo: possibly home sunday if improved and bcx

## 2019-10-18 NOTE — CONSULT NOTE ADULT - SUBJECTIVE AND OBJECTIVE BOX
Hematology/Oncology Consult Note    HPI:  69 year old male with newly diagnosed GCB type diffuse large B-cell lymphoma (as of 9/2019) on R-CHOP, rectal colitis in 8/2019, HTN, HLD, CAD s/p stent, hypothyroidism, h/o prostate CA (treated), DANIEL (not on CPAP), admitted for neutropenic fever.     Patient began R-CHOP 8 days ago 10/10 with [facility]. The patient felt well after treatment. He received an infusion of IV iron on 10/14. 10/16 he developed a fever of 101F along with malaise, chills, increased gas/bloating. Saw oncologist AM of 10/17 and was given IV fluids and a dose of levofloxacin 750 mg. His fever did not subside so he was sent to the ED.     In the ED, vitals were T100.5F-103F, HR 82-92, -150/66-75 mm Hg, RR 16-18 bpm, 96-97% RA. He received 2g cefepime, 1g vancomycin, 2.1L NS bolus, 650 mg acetaminophen, and 600 mg ibuprofen. BCX and UCX were sent. Pt reports he developed a nonpainful nonpruritic left sided facial rash associated with fever while in the ED that resolved on its own.    Seen at bedside today, accompanied by son. Patient reports feeling better today than he did yesterday. States he has had some gas/bloating and an OK appetite. Reports he has had some tenderness associated with his abdominal mass but denies abdominal pain at rest. He denies sick contacts, recent travel, recent illnesses, new medications, chest pain, SOB, palpitations, cough, abdominal pain, back pain, diarrhea, dysuria, hematochezia, hematuria, dizziness, numbness/tingling, or rash. Reports last bowel movement was 10/16 and normal, well formed.     Allergies: No Known Allergies    MEDICATIONS  (STANDING):  allopurinol 300 milliGRAM(s) Oral daily  aspirin enteric coated 81 milliGRAM(s) Oral daily  atorvastatin 40 milliGRAM(s) Oral at bedtime  cefepime   IVPB 2000 milliGRAM(s) IV Intermittent every 8 hours  influenza   Vaccine 0.5 milliLiter(s) IntraMuscular once  levothyroxine 100 MICROGram(s) Oral daily  metoprolol tartrate 50 milliGRAM(s) Oral two times a day  pantoprazole    Tablet 40 milliGRAM(s) Oral before breakfast  potassium phosphate / sodium phosphate powder 1 Packet(s) Oral three times a day with meals    MEDICATIONS  (PRN):  acetaminophen   Tablet .. 650 milliGRAM(s) Oral every 6 hours PRN Temp greater or equal to 38.5C (101.3F)  metoclopramide 5 milliGRAM(s) Oral Before meals and at bedtime PRN nausea and/or vomiting    PAST MEDICAL & SURGICAL HISTORY:  Hypothyroidism  CAD (coronary artery disease): s/p stent placed in the LAD in 2013  Lymphoma  Sleep apnea  Prostate cancer treated in 2008 with Cyberknife at [place] in 2008. He has had regular urologic f/u's and he reports that his PSA is undetectable.  Hypercholesteremia  HTN (hypertension)  Uvular hypertrophy    FAMILY HISTORY:  Family history of colorectal cancer  Family history of lymphoma    SOCIAL HISTORY: No EtOH, no tobacco    REVIEW OF SYSTEMS:    CONSTITUTIONAL: + weight loss, otherwise As per HPI  EYES/ENT: No visual changes;  No rhinorrhea or throat pain   NECK: No pain or stiffness  RESPIRATORY: No cough; No shortness of breath  CARDIOVASCULAR: No chest pain or palpitations  GASTROINTESTINAL: As per HPI.  GENITOURINARY: No dysuria, frequency or hematuria  NEUROLOGICAL: No numbness or weakness  SKIN: No itching, burning, rashes, or lesions   All other review of systems is negative unless indicated above.    Height (cm): 162.6 (10-17 @ 22:15), 163.8 (10-17 @ 13:45)  Weight (kg): 68.6 (10-17 @ 22:15), 68.1 (10-17 @ 13:45)  BMI (kg/m2): 25.9 (10-17 @ 22:15), 25.4 (10-17 @ 13:45)  BSA (m2): 1.74 (10-17 @ 22:15), 1.74 (10-17 @ 13:45)    T(F): 98.6 (10-18-19 @ 09:16), Max: 103 (10-17-19 @ 17:50)  HR: 70 (10-18-19 @ 09:16)  BP: 148/82 (10-18-19 @ 09:16)  RR: 18 (10-18-19 @ 09:16)  SpO2: 97% (10-18-19 @ 09:16)  Wt(kg): --    GENERAL: NAD, well-developed  HEAD:  Atraumatic, Normocephalic  EYES: EOMI, PERRLA, conjunctiva and sclera clear  HEENT: Dry mouth. No oropharyngeal erythema or exudates.  NECK: Supple, No JVD. No palpable cervical or submandibular lymphadenopathy.  CHEST/LUNG: Clear to auscultation bilaterally; No wheeze. Normal respiratory effort.  HEART: Regular rate and rhythm; No murmurs, rubs, or gallops  ABDOMEN: + mild left lower tenderness to deep palpation, Soft, Nondistended; Bowel sounds present  EXTREMITIES:  2+ Peripheral Pulses, No clubbing, cyanosis, or edema  NEUROLOGY: non-focal  SKIN: No rashes or lesions                          7.4    0.30  )-----------( 122      ( 18 Oct 2019 06:59 )             23.1   Lab note reports unable to perform differential as count was too low    10-18    135  |  99  |  16  ----------------------------<  110<H>  3.8   |  23  |  0.90    Ca    8.2<L>      18 Oct 2019 06:59  Phos  1.9     10-18  Mg     1.6     10-18    TPro  5.4<L>  /  Alb  2.9<L>  /  TBili  0.4  /  DBili  x   /  AST  7<L>  /  ALT  10  /  AlkPhos  76  10-18      Haptoglobin, Serum: 235 mg/dL (10-18 @ 08:58)  Magnesium, Serum: 1.6 mg/dL (10-18 @ 06:59)  Phosphorus Level, Serum: 1.9 mg/dL (10-18 @ 06:59)  Lactate Dehydrogenase, Serum: 111 U/L (10-18 @ 06:59)  Uric Acid, Serum: 3.1 mg/dL (10-18 @ 06:59)  Lactate Dehydrogenase, Serum: 121 U/L (10-17 @ 11:31)    Rapid Respiratory Viral Panel (10.17.19 @ 18:18)    Rapid RVP Result: Detected: This Respiratory Panel uses polymerase chain reaction (PCR) to detect for  adenovirus; coronavirus (HKU1, NL63, 229E, OC43); human metapneumovirus  (hMPV); human enterovirus/rhinovirus (Entero/RV); influenza A; influenza  A/H1; influenza A/H3; influenza A/H1-2009; influenza B; parainfluenza  viruses 1, 2, 3, 4; respiratory syncytial virus; Mycoplasma pneumoniae;  and Chlamydophila pneumoniae.    Entero/Rhinovirus (RapRVP): Detected    < from: Xray Chest 1 View AP/PA (10.17.19 @ 18:45) >    EXAM:  XR CHEST AP OR PA 1V                            PROCEDURE DATE:  10/17/2019            INTERPRETATION:  CLINICAL INDICATION: Sepsis, shortness of breath    TECHNIQUE:   Single view frontal radiograph the chest    COMPARISON: Chest radiograph5/9/2013    FINDINGS:     No acute osseous abnormality. The trachea is midline. The   cardiomediastinal silhouette is unremarkable. The lungs are clear   bilaterally. There is no pleural effusion or pneumothorax.    IMPRESSION:    Clear lungs.                AMINATA MAYOGRA M.D., RADIOLOGY RESIDENT  This document has been electronically signed.  RAFAELA MCKEE M.D., ATTENDING RADIOLOGIST  This document has been electronically signed. Oct 18 2019  8:27AM    < end of copied text > Hematology Consult Note    HPI:  69 year old male with newly diagnosed GCB type diffuse large B-cell lymphoma (as of 9/2019) on R-CHOP, rectal colitis in 8/2019, HTN, HLD, CAD s/p stent, hypothyroidism, h/o prostate CA (treated), DANIEL (not on CPAP), admitted for neutropenic fever.     Patient began R-CHOP 8 days ago 10/10 with [facility]. The patient felt well after treatment. He received an infusion of IV iron on 10/14. 10/16 he developed a fever of 101F along with malaise, chills, increased gas/bloating. Saw oncologist AM of 10/17 and was given IV fluids and a dose of levofloxacin 750 mg. His fever did not subside so he was sent to the ED.     In the ED, vitals were T100.5F-103F, HR 82-92, -150/66-75 mm Hg, RR 16-18 bpm, 96-97% RA. He received 2g cefepime, 1g vancomycin, 2.1L NS bolus, 650 mg acetaminophen, and 600 mg ibuprofen. BCX and UCX were sent. Pt reports he developed a nonpainful nonpruritic left sided facial rash associated with fever while in the ED that resolved on its own.    Seen at bedside today, accompanied by son. Patient reports feeling better today than he did yesterday. States he has had some gas/bloating and an OK appetite. Reports he has had some tenderness associated with his abdominal mass but denies abdominal pain at rest. He denies sick contacts, recent travel, recent illnesses, new medications, chest pain, SOB, palpitations, cough, abdominal pain, back pain, diarrhea, dysuria, hematochezia, hematuria, dizziness, numbness/tingling, or rash. Reports last bowel movement was 10/16 and normal, well formed.     Allergies: No Known Allergies    MEDICATIONS  (STANDING):  allopurinol 300 milliGRAM(s) Oral daily  aspirin enteric coated 81 milliGRAM(s) Oral daily  atorvastatin 40 milliGRAM(s) Oral at bedtime  cefepime   IVPB 2000 milliGRAM(s) IV Intermittent every 8 hours  influenza   Vaccine 0.5 milliLiter(s) IntraMuscular once  levothyroxine 100 MICROGram(s) Oral daily  metoprolol tartrate 50 milliGRAM(s) Oral two times a day  pantoprazole    Tablet 40 milliGRAM(s) Oral before breakfast  potassium phosphate / sodium phosphate powder 1 Packet(s) Oral three times a day with meals    MEDICATIONS  (PRN):  acetaminophen   Tablet .. 650 milliGRAM(s) Oral every 6 hours PRN Temp greater or equal to 38.5C (101.3F)  metoclopramide 5 milliGRAM(s) Oral Before meals and at bedtime PRN nausea and/or vomiting    PAST MEDICAL & SURGICAL HISTORY:  Hypothyroidism  CAD (coronary artery disease): s/p stent placed in the LAD in 2013  Lymphoma  Sleep apnea  Prostate cancer treated in 2008 with Cyberknife at [facility] in 2008. He has had regular urologic f/u's and he reports that his PSA is undetectable.  Hypercholesteremia  HTN (hypertension)  Uvular hypertrophy    FAMILY HISTORY:  Family history of colorectal cancer  Family history of lymphoma    SOCIAL HISTORY: No EtOH, no tobacco    REVIEW OF SYSTEMS:    CONSTITUTIONAL: + weight loss, otherwise As per HPI  EYES/ENT: No visual changes;  No rhinorrhea or throat pain   NECK: No pain or stiffness  RESPIRATORY: No cough; No shortness of breath  CARDIOVASCULAR: No chest pain or palpitations  GASTROINTESTINAL: As per HPI.  GENITOURINARY: No dysuria, frequency or hematuria  NEUROLOGICAL: No numbness or weakness  SKIN: No itching, burning, rashes, or lesions   All other review of systems is negative unless indicated above.    Height (cm): 162.6 (10-17 @ 22:15), 163.8 (10-17 @ 13:45)  Weight (kg): 68.6 (10-17 @ 22:15), 68.1 (10-17 @ 13:45)  BMI (kg/m2): 25.9 (10-17 @ 22:15), 25.4 (10-17 @ 13:45)  BSA (m2): 1.74 (10-17 @ 22:15), 1.74 (10-17 @ 13:45)    T(F): 98.6 (10-18-19 @ 09:16), Max: 103 (10-17-19 @ 17:50)  HR: 70 (10-18-19 @ 09:16)  BP: 148/82 (10-18-19 @ 09:16)  RR: 18 (10-18-19 @ 09:16)  SpO2: 97% (10-18-19 @ 09:16)  Wt(kg): --    GENERAL: NAD, well-developed  HEAD:  Atraumatic, Normocephalic  EYES: EOMI, PERRLA, conjunctiva and sclera clear  HEENT: Dry mouth. No oropharyngeal erythema or exudates.  NECK: Supple, No JVD. No palpable cervical or submandibular lymphadenopathy.  CHEST/LUNG: Clear to auscultation bilaterally; No wheeze. Normal respiratory effort.  HEART: Regular rate and rhythm; No murmurs, rubs, or gallops  ABDOMEN: + mild left lower tenderness to deep palpation, Soft, Nondistended; Bowel sounds present  EXTREMITIES:  2+ Peripheral Pulses, No clubbing, cyanosis, or edema  NEUROLOGY: non-focal  SKIN: No rashes or lesions                          7.4    0.30  )-----------( 122      ( 18 Oct 2019 06:59 )             23.1   Lab note reports unable to perform differential as count was too low    10-18    135  |  99  |  16  ----------------------------<  110<H>  3.8   |  23  |  0.90    Ca    8.2<L>      18 Oct 2019 06:59  Phos  1.9     10-18  Mg     1.6     10-18    TPro  5.4<L>  /  Alb  2.9<L>  /  TBili  0.4  /  DBili  x   /  AST  7<L>  /  ALT  10  /  AlkPhos  76  10-18      Haptoglobin, Serum: 235 mg/dL (10-18 @ 08:58)  Magnesium, Serum: 1.6 mg/dL (10-18 @ 06:59)  Phosphorus Level, Serum: 1.9 mg/dL (10-18 @ 06:59)  Lactate Dehydrogenase, Serum: 111 U/L (10-18 @ 06:59)  Uric Acid, Serum: 3.1 mg/dL (10-18 @ 06:59)  Lactate Dehydrogenase, Serum: 121 U/L (10-17 @ 11:31)    Rapid Respiratory Viral Panel (10.17.19 @ 18:18)    Rapid RVP Result: Detected: This Respiratory Panel uses polymerase chain reaction (PCR) to detect for  adenovirus; coronavirus (HKU1, NL63, 229E, OC43); human metapneumovirus  (hMPV); human enterovirus/rhinovirus (Entero/RV); influenza A; influenza  A/H1; influenza A/H3; influenza A/H1-2009; influenza B; parainfluenza  viruses 1, 2, 3, 4; respiratory syncytial virus; Mycoplasma pneumoniae;  and Chlamydophila pneumoniae.    Entero/Rhinovirus (RapRVP): Detected    < from: Xray Chest 1 View AP/PA (10.17.19 @ 18:45) >    EXAM:  XR CHEST AP OR PA 1V                            PROCEDURE DATE:  10/17/2019            INTERPRETATION:  CLINICAL INDICATION: Sepsis, shortness of breath    TECHNIQUE:   Single view frontal radiograph the chest    COMPARISON: Chest radiograph5/9/2013    FINDINGS:     No acute osseous abnormality. The trachea is midline. The   cardiomediastinal silhouette is unremarkable. The lungs are clear   bilaterally. There is no pleural effusion or pneumothorax.    IMPRESSION:    Clear lungs.                AMINATA MAYORGA M.D., RADIOLOGY RESIDENT  This document has been electronically signed.  RAFAELA MCKEE M.D., ATTENDING RADIOLOGIST  This document has been electronically signed. Oct 18 2019  8:27AM    < end of copied text >

## 2019-10-18 NOTE — PROGRESS NOTE ADULT - PROBLEM SELECTOR PLAN 3
Patient recently treated with chemotherapy. WBC 0.2, Hgb 8.2, and platelets 143.  - CBC from ED without differential; will re-order CBC with diff  - patient without active signs of bleeding   - continue to trend  - will consult Oncology; follow up recs Hgb dropped from 8.4 to 7.2, no s/s of bleeding likely chemo induced, dilutional from IVF, and AM vs stat lab variation  -trend cbc  -platelet downtrending, monitor  -ANC 0.3, still neutropenic, monitor anc daily  -heme consult pending

## 2019-10-18 NOTE — CONSULT NOTE ADULT - ATTENDING COMMENTS
The patient has a histroy of acute myeloid leukemia and he has received chemotherapy 8 days ago. He is admitted for treatment of elevated temperature and fatigue. He is noted to be neutropenic. Physical examination arboleda not show localized findings.  I agree with antibiotic regimen. Please continue hydration in this acute setting. Evaluation for sepsis is proceeding with blood and urine culture results pending The patient has a history of diffuse large B cell lymphoma and he has received chemotherapy 8 days ago. He is admitted for treatment of elevated temperature and fatigue. He is noted to be neutropenic. Physical examination arboleda not show localized findings.  I agree with antibiotic regimen. Please continue hydration in this acute setting. Evaluation for sepsis is proceeding with blood and urine culture results pending

## 2019-10-18 NOTE — PROGRESS NOTE ADULT - PROBLEM SELECTOR PLAN 6
On losartan 100 mg daily at home.  - blood pressure stable  - will hold losartan and resume in AM as blood pressure permits -160s, sepsis appears controlled,   -c/w home metoprolol 50mg bid  -restart losartan 50mg daily (takes 100mg at home), uptitrate as needed  -monitor bp

## 2019-10-18 NOTE — PROGRESS NOTE ADULT - SUBJECTIVE AND OBJECTIVE BOX
***************************************************************  Stella Rockdale, PGY1  Internal Medicine   pager: [phone]  ***************************************************************    ANNA BATES  69y  MRN: [id number]    Patient is a 69y old  Male who presents with a chief complaint of Neutropenic Fever (17 Oct 2019 23:15)      Subjective: no events ON. Denies fever, CP, SOB, abn pain, N/V, dysuria. Tolerating diet.      MEDICATIONS  (STANDING):  allopurinol 300 milliGRAM(s) Oral daily  aspirin enteric coated 81 milliGRAM(s) Oral daily  atorvastatin 40 milliGRAM(s) Oral at bedtime  cefepime   IVPB 2000 milliGRAM(s) IV Intermittent every 8 hours  influenza   Vaccine 0.5 milliLiter(s) IntraMuscular once  levothyroxine 100 MICROGram(s) Oral daily  metoprolol tartrate 50 milliGRAM(s) Oral two times a day  pantoprazole    Tablet 40 milliGRAM(s) Oral before breakfast  sodium chloride 0.9%. 1000 milliLiter(s) (75 mL/Hr) IV Continuous <Continuous>    MEDICATIONS  (PRN):  acetaminophen   Tablet .. 650 milliGRAM(s) Oral every 6 hours PRN Temp greater or equal to 38.5C (101.3F)  metoclopramide 5 milliGRAM(s) Oral Before meals and at bedtime PRN nausea and/or vomiting      Objective:    Vitals: Vital Signs Last 24 Hrs  T(C): 37.3 (10-18-19 @ 05:46), Max: 39.4 (10-17-19 @ 17:50)  T(F): 99.1 (10-18-19 @ 05:46), Max: 103 (10-17-19 @ 17:50)  HR: 67 (10-18-19 @ 05:46) (67 - 92)  BP: 135/71 (10-18-19 @ 05:46) (110/68 - 164/68)  BP(mean): --  RR: 17 (10-18-19 @ 05:46) (16 - 18)  SpO2: 96% (10-18-19 @ 05:46) (95% - 98%)            I&O's Summary    17 Oct 2019 07:01  -  18 Oct 2019 07:00  --------------------------------------------------------  IN: 135 mL / OUT: 0 mL / NET: 135 mL        General: Well-nourished, well-developed, not in acute distress  	Head: Normocephalic, Atraumatic  	Eyes: PERRLA, EOMI, normal sclera  	Throat: Dry mucous membranes  	Respiratory: CTAB, normal respiratory effort, no wheezes, crackles, rales  	CV: RRR, S1/S2, no murmurs, rubs or gallops  	Abdominal: Soft, bowel sounds present, NT, ND, left-sided hard, nontender, circular mass palpated  	Extremities: No edema, 2+ DP pulses  	Neurological: A&Ox4, MAEx4, non-focal  Skin: No rashes    LABS:  10-17    132<L>  |  96  |  16  ----------------------------<  106<H>  4.1   |  22  |  0.94  10-17    135  |  96  |  17  ----------------------------<  126<H>  4.5   |  24  |  1.09    Ca    8.4      17 Oct 2019 17:33  Ca    9.1      17 Oct 2019 11:31    TPro  5.7<L>  /  Alb  3.2<L>  /  TBili  0.5  /  DBili  x   /  AST  6<L>  /  ALT  10  /  AlkPhos  86  10-17  TPro  6.1  /  Alb  3.9  /  TBili  0.6  /  DBili  x   /  AST  7<L>  /  ALT  12  /  AlkPhos  106  10-17      PT/INR - ( 17 Oct 2019 17:33 )   PT: 14.4 sec;   INR: 1.25 ratio         PTT - ( 17 Oct 2019 17:33 )  PTT:27.9 sec              Urinalysis Basic - ( 17 Oct 2019 18:33 )    Color: Light Yellow / Appearance: Clear / S.019 / pH: x  Gluc: x / Ketone: Negative  / Bili: Negative / Urobili: Negative   Blood: x / Protein: Trace / Nitrite: Negative   Leuk Esterase: Negative / RBC: x / WBC x   Sq Epi: x / Non Sq Epi: x / Bacteria: x                              7.4    0.30  )-----------( 122      ( 18 Oct 2019 06:59 )             23.1                         8.2    0.20  )-----------( 143      ( 17 Oct 2019 17:33 )             25.1                         9.8    0.3   )-----------( 153      ( 17 Oct 2019 11:33 )             31.5     CAPILLARY BLOOD GLUCOSE          RADIOLOGY & ADDITIONAL TESTS:    Imaging Personally Reviewed:  [x ] YES  [ ] NO    Consultants involved in case:   Consultant(s) Notes Reviewed:  [ x] YES  [ ] NO:   Care Discussed with Consultants/Other Providers [x ] YES  [ ] NO ***************************************************************  Stella Rockdale, PGY1  Internal Medicine   pager: [phone]  ***************************************************************    ANNA BATES  69y  MRN: [id number]    Patient is a 69y old  Male who presents with a chief complaint of Neutropenic Fever (17 Oct 2019 23:15)      Subjective:  Had fever to 101.3 overnight. Feels much better now, Denies chest pain, sob, dysuria, skin rash, f/c, n/v, cough, soar throat, runny nose. No bleeding, denies melena, hematochezia     MEDICATIONS  (STANDING):  allopurinol 300 milliGRAM(s) Oral daily  aspirin enteric coated 81 milliGRAM(s) Oral daily  atorvastatin 40 milliGRAM(s) Oral at bedtime  cefepime   IVPB 2000 milliGRAM(s) IV Intermittent every 8 hours  influenza   Vaccine 0.5 milliLiter(s) IntraMuscular once  levothyroxine 100 MICROGram(s) Oral daily  metoprolol tartrate 50 milliGRAM(s) Oral two times a day  pantoprazole    Tablet 40 milliGRAM(s) Oral before breakfast  sodium chloride 0.9%. 1000 milliLiter(s) (75 mL/Hr) IV Continuous <Continuous>    MEDICATIONS  (PRN):  acetaminophen   Tablet .. 650 milliGRAM(s) Oral every 6 hours PRN Temp greater or equal to 38.5C (101.3F)  metoclopramide 5 milliGRAM(s) Oral Before meals and at bedtime PRN nausea and/or vomiting      Objective:    Vitals: Vital Signs Last 24 Hrs  T(C): 37.3 (10-18-19 @ 05:46), Max: 39.4 (10-17-19 @ 17:50)  T(F): 99.1 (10-18-19 @ 05:46), Max: 103 (10-17-19 @ 17:50)  HR: 67 (10-18-19 @ 05:46) (67 - 92)  BP: 135/71 (10-18-19 @ 05:46) (110/68 - 164/68)  BP(mean): --  RR: 17 (10-18-19 @ 05:46) (16 - 18)  SpO2: 96% (10-18-19 @ 05:46) (95% - 98%)            I&O's Summary    17 Oct 2019 07:01  -  18 Oct 2019 07:00  --------------------------------------------------------  IN: 135 mL / OUT: 0 mL / NET: 135 mL        General: Well-nourished, well-developed, not in acute distress, nontoxicc  	Head: Normocephalic, Atraumatic  	Throat: no pharyngeal erythema   	Respiratory: CTAB, normal respiratory effort, no wheezes, crackles, rales  	CV: RRR, S1/S2,   	Abdominal: Soft, bowel sounds present, NT, ND, left-sided hard, nontender, circular mass palpated  	Extremities: No edema, 2+ DP pulses  	Neurological: A&Ox4, MAEx4, non-focal      Skin: No rashes    LABS:  10-17    132<L>  |  96  |  16  ----------------------------<  106<H>  4.1   |  22  |  0.94  10-17    135  |  96  |  17  ----------------------------<  126<H>  4.5   |  24  |  1.09    Ca    8.4      17 Oct 2019 17:33  Ca    9.1      17 Oct 2019 11:31    TPro  5.7<L>  /  Alb  3.2<L>  /  TBili  0.5  /  DBili  x   /  AST  6<L>  /  ALT  10  /  AlkPhos  86  10-17  TPro  6.1  /  Alb  3.9  /  TBili  0.6  /  DBili  x   /  AST  7<L>  /  ALT  12  /  AlkPhos  106  10-17      PT/INR - ( 17 Oct 2019 17:33 )   PT: 14.4 sec;   INR: 1.25 ratio         PTT - ( 17 Oct 2019 17:33 )  PTT:27.9 sec              Urinalysis Basic - ( 17 Oct 2019 18:33 )    Color: Light Yellow / Appearance: Clear / S.019 / pH: x  Gluc: x / Ketone: Negative  / Bili: Negative / Urobili: Negative   Blood: x / Protein: Trace / Nitrite: Negative   Leuk Esterase: Negative / RBC: x / WBC x   Sq Epi: x / Non Sq Epi: x / Bacteria: x                              7.4    0.30  )-----------( 122      ( 18 Oct 2019 06:59 )             23.1                         8.2    0.20  )-----------( 143      ( 17 Oct 2019 17:33 )             25.1                         9.8    0.3   )-----------( 153      ( 17 Oct 2019 11:33 )             31.5     CAPILLARY BLOOD GLUCOSE          RADIOLOGY & ADDITIONAL TESTS:    Imaging Personally Reviewed:  [x ] YES  [ ] NO    Consultants involved in case:   Consultant(s) Notes Reviewed:  [ x] YES  [ ] NO:   Care Discussed with Consultants/Other Providers [x ] YES  [ ] NO (2) well flexed

## 2019-10-18 NOTE — PROGRESS NOTE ADULT - PROBLEM SELECTOR PLAN 5
s/p stent  - continue with ASA daily and metoprolol 50 mg BID s/p remove stent  - continue with ASA daily, may need to hold if platelet <50  - c/w metoprolol 50 mg BID

## 2019-10-18 NOTE — PROGRESS NOTE ADULT - PROBLEM SELECTOR PLAN 4
- newly diagnosed B-cell lymphoma (as of 9/2019) on [facility]  - sees Dr. Kingston at [facility]  - Oncology Consult; follow up recs  - will check tumor lysis labs in the AM - newly diagnosed B-cell lymphoma (as of 9/2019) on RCHOP  - tumor lysis labs negative  -heme consult pending

## 2019-10-19 LAB
ANION GAP SERPL CALC-SCNC: 10 MMOL/L — SIGNIFICANT CHANGE UP (ref 5–17)
BUN SERPL-MCNC: 15 MG/DL — SIGNIFICANT CHANGE UP (ref 7–23)
CALCIUM SERPL-MCNC: 7.5 MG/DL — LOW (ref 8.4–10.5)
CHLORIDE SERPL-SCNC: 103 MMOL/L — SIGNIFICANT CHANGE UP (ref 96–108)
CO2 SERPL-SCNC: 24 MMOL/L — SIGNIFICANT CHANGE UP (ref 22–31)
CREAT SERPL-MCNC: 0.89 MG/DL — SIGNIFICANT CHANGE UP (ref 0.5–1.3)
GLUCOSE SERPL-MCNC: 99 MG/DL — SIGNIFICANT CHANGE UP (ref 70–99)
HCT VFR BLD CALC: 22.7 % — LOW (ref 39–50)
HCV AB S/CO SERPL IA: 0.22 S/CO — SIGNIFICANT CHANGE UP (ref 0–0.99)
HCV AB SERPL-IMP: SIGNIFICANT CHANGE UP
HGB BLD-MCNC: 7.3 G/DL — LOW (ref 13–17)
MAGNESIUM SERPL-MCNC: 1.8 MG/DL — SIGNIFICANT CHANGE UP (ref 1.6–2.6)
MCHC RBC-ENTMCNC: 23.5 PG — LOW (ref 27–34)
MCHC RBC-ENTMCNC: 32.2 GM/DL — SIGNIFICANT CHANGE UP (ref 32–36)
MCV RBC AUTO: 73 FL — LOW (ref 80–100)
NRBC # BLD: 0 /100 WBCS — SIGNIFICANT CHANGE UP (ref 0–0)
PHOSPHATE SERPL-MCNC: 1.5 MG/DL — LOW (ref 2.5–4.5)
PLATELET # BLD AUTO: 100 K/UL — LOW (ref 150–400)
POTASSIUM SERPL-MCNC: 3.4 MMOL/L — LOW (ref 3.5–5.3)
POTASSIUM SERPL-SCNC: 3.4 MMOL/L — LOW (ref 3.5–5.3)
RBC # BLD: 3.11 M/UL — LOW (ref 4.2–5.8)
RBC # FLD: 15.7 % — HIGH (ref 10.3–14.5)
SODIUM SERPL-SCNC: 137 MMOL/L — SIGNIFICANT CHANGE UP (ref 135–145)
WBC # BLD: 1.8 K/UL — LOW (ref 3.8–10.5)
WBC # FLD AUTO: 1.8 K/UL — LOW (ref 3.8–10.5)

## 2019-10-19 PROCEDURE — 99232 SBSQ HOSP IP/OBS MODERATE 35: CPT | Mod: GC

## 2019-10-19 PROCEDURE — 99233 SBSQ HOSP IP/OBS HIGH 50: CPT | Mod: GC

## 2019-10-19 RX ORDER — LOSARTAN POTASSIUM 100 MG/1
100 TABLET, FILM COATED ORAL DAILY
Refills: 0 | Status: DISCONTINUED | OUTPATIENT
Start: 2019-10-19 | End: 2019-10-20

## 2019-10-19 RX ORDER — SODIUM,POTASSIUM PHOSPHATES 278-250MG
1 POWDER IN PACKET (EA) ORAL
Refills: 0 | Status: DISCONTINUED | OUTPATIENT
Start: 2019-10-19 | End: 2019-10-20

## 2019-10-19 RX ORDER — POTASSIUM CHLORIDE 20 MEQ
40 PACKET (EA) ORAL ONCE
Refills: 0 | Status: COMPLETED | OUTPATIENT
Start: 2019-10-19 | End: 2019-10-19

## 2019-10-19 RX ADMIN — ATORVASTATIN CALCIUM 40 MILLIGRAM(S): 80 TABLET, FILM COATED ORAL at 22:50

## 2019-10-19 RX ADMIN — Medication 40 MILLIEQUIVALENT(S): at 09:06

## 2019-10-19 RX ADMIN — Medication 1 PACKET(S): at 17:57

## 2019-10-19 RX ADMIN — CEFEPIME 100 MILLIGRAM(S): 1 INJECTION, POWDER, FOR SOLUTION INTRAMUSCULAR; INTRAVENOUS at 17:57

## 2019-10-19 RX ADMIN — Medication 100 MICROGRAM(S): at 06:03

## 2019-10-19 RX ADMIN — Medication 1 PACKET(S): at 12:32

## 2019-10-19 RX ADMIN — PANTOPRAZOLE SODIUM 40 MILLIGRAM(S): 20 TABLET, DELAYED RELEASE ORAL at 06:03

## 2019-10-19 RX ADMIN — Medication 50 MILLIGRAM(S): at 17:57

## 2019-10-19 RX ADMIN — CEFEPIME 100 MILLIGRAM(S): 1 INJECTION, POWDER, FOR SOLUTION INTRAMUSCULAR; INTRAVENOUS at 11:06

## 2019-10-19 RX ADMIN — CEFEPIME 100 MILLIGRAM(S): 1 INJECTION, POWDER, FOR SOLUTION INTRAMUSCULAR; INTRAVENOUS at 02:06

## 2019-10-19 RX ADMIN — Medication 81 MILLIGRAM(S): at 11:08

## 2019-10-19 RX ADMIN — Medication 300 MILLIGRAM(S): at 11:08

## 2019-10-19 RX ADMIN — Medication 1 PACKET(S): at 08:35

## 2019-10-19 RX ADMIN — Medication 50 MILLIGRAM(S): at 06:03

## 2019-10-19 NOTE — PROGRESS NOTE ADULT - SUBJECTIVE AND OBJECTIVE BOX
Chief Complaint:     INTERVAL HPI/OVERNIGHT EVENTS:    MEDICATIONS  (STANDING):  allopurinol 300 milliGRAM(s) Oral daily  aspirin enteric coated 81 milliGRAM(s) Oral daily  atorvastatin 40 milliGRAM(s) Oral at bedtime  cefepime   IVPB 2000 milliGRAM(s) IV Intermittent every 8 hours  influenza   Vaccine 0.5 milliLiter(s) IntraMuscular once  levothyroxine 100 MICROGram(s) Oral daily  losartan 100 milliGRAM(s) Oral daily  metoprolol tartrate 50 milliGRAM(s) Oral two times a day  pantoprazole    Tablet 40 milliGRAM(s) Oral before breakfast  potassium phosphate / sodium phosphate powder 1 Packet(s) Oral three times a day with meals    MEDICATIONS  (PRN):  acetaminophen   Tablet .. 650 milliGRAM(s) Oral every 6 hours PRN Temp greater or equal to 38.5C (101.3F)  metoclopramide 5 milliGRAM(s) Oral Before meals and at bedtime PRN nausea and/or vomiting      Allergies    No Known Allergies    Intolerances        ROS: as above     Vital Signs Last 24 Hrs  T(C): 36.8 (19 Oct 2019 05:14), Max: 37.9 (18 Oct 2019 13:37)  T(F): 98.3 (19 Oct 2019 05:14), Max: 100.2 (18 Oct 2019 13:37)  HR: 79 (19 Oct 2019 09:45) (61 - 79)  BP: 129/62 (19 Oct 2019 09:45) (120/60 - 149/72)  BP(mean): --  RR: 18 (19 Oct 2019 05:14) (17 - 18)  SpO2: 97% (19 Oct 2019 09:45) (95% - 98%)    Physical Exam:   AAO x 3, NAD   RRR S1S2  CTA b/l   soft NTNDBS+  no c/c/e    LABS:                        7.3    1.80  )-----------( 100      ( 19 Oct 2019 07:15 )             22.7     10-    137  |  103  |  15  ----------------------------<  99  3.4<L>   |  24  |  0.89    Ca    7.5<L>      19 Oct 2019 07:15  Phos  1.5     10-19  Mg     1.8     10-    TPro  5.4<L>  /  Alb  2.9<L>  /  TBili  0.4  /  DBili  x   /  AST  7<L>  /  ALT  10  /  AlkPhos  76  10-18    PT/INR - ( 17 Oct 2019 17:33 )   PT: 14.4 sec;   INR: 1.25 ratio         PTT - ( 17 Oct 2019 17:33 )  PTT:27.9 sec  Urinalysis Basic - ( 17 Oct 2019 18:33 )    Color: Light Yellow / Appearance: Clear / S.019 / pH: x  Gluc: x / Ketone: Negative  / Bili: Negative / Urobili: Negative   Blood: x / Protein: Trace / Nitrite: Negative   Leuk Esterase: Negative / RBC: x / WBC x   Sq Epi: x / Non Sq Epi: x / Bacteria: x Chief Complaint: Neutropenic fever, DLBCL     INTERVAL HPI/OVERNIGHT EVENTS:     MEDICATIONS  (STANDING):  allopurinol 300 milliGRAM(s) Oral daily  aspirin enteric coated 81 milliGRAM(s) Oral daily  atorvastatin 40 milliGRAM(s) Oral at bedtime  cefepime   IVPB 2000 milliGRAM(s) IV Intermittent every 8 hours  influenza   Vaccine 0.5 milliLiter(s) IntraMuscular once  levothyroxine 100 MICROGram(s) Oral daily  losartan 100 milliGRAM(s) Oral daily  metoprolol tartrate 50 milliGRAM(s) Oral two times a day  pantoprazole    Tablet 40 milliGRAM(s) Oral before breakfast  potassium phosphate / sodium phosphate powder 1 Packet(s) Oral three times a day with meals    MEDICATIONS  (PRN):  acetaminophen   Tablet .. 650 milliGRAM(s) Oral every 6 hours PRN Temp greater or equal to 38.5C (101.3F)  metoclopramide 5 milliGRAM(s) Oral Before meals and at bedtime PRN nausea and/or vomiting      Allergies    No Known Allergies    Intolerances        ROS: as above     Vital Signs Last 24 Hrs  T(C): 36.8 (19 Oct 2019 05:14), Max: 37.9 (18 Oct 2019 13:37)  T(F): 98.3 (19 Oct 2019 05:14), Max: 100.2 (18 Oct 2019 13:37)  HR: 79 (19 Oct 2019 09:45) (61 - 79)  BP: 129/62 (19 Oct 2019 09:45) (120/60 - 149/72)  BP(mean): --  RR: 18 (19 Oct 2019 05:14) (17 - 18)  SpO2: 97% (19 Oct 2019 09:45) (95% - 98%)    Physical Exam:   AAO x 3, NAD   RRR S1S2  CTA b/l   soft NTNDBS+  no c/c/e    LABS:                        7.3    1.80  )-----------( 100      ( 19 Oct 2019 07:15 )             22.7     10-19    137  |  103  |  15  ----------------------------<  99  3.4<L>   |  24  |  0.89    Ca    7.5<L>      19 Oct 2019 07:15  Phos  1.5     10-19  Mg     1.8     10-    TPro  5.4<L>  /  Alb  2.9<L>  /  TBili  0.4  /  DBili  x   /  AST  7<L>  /  ALT  10  /  AlkPhos  76  10-    PT/INR - ( 17 Oct 2019 17:33 )   PT: 14.4 sec;   INR: 1.25 ratio         PTT - ( 17 Oct 2019 17:33 )  PTT:27.9 sec  Urinalysis Basic - ( 17 Oct 2019 18:33 )    Color: Light Yellow / Appearance: Clear / S.019 / pH: x  Gluc: x / Ketone: Negative  / Bili: Negative / Urobili: Negative   Blood: x / Protein: Trace / Nitrite: Negative   Leuk Esterase: Negative / RBC: x / WBC x   Sq Epi: x / Non Sq Epi: x / Bacteria: x Chief Complaint: Neutropenic fever, DLBCL     INTERVAL HPI/OVERNIGHT EVENTS: Feels much better today c/w yesterday. Afebrile x almost 48 hrs.     MEDICATIONS  (STANDING):  allopurinol 300 milliGRAM(s) Oral daily  aspirin enteric coated 81 milliGRAM(s) Oral daily  atorvastatin 40 milliGRAM(s) Oral at bedtime  cefepime   IVPB 2000 milliGRAM(s) IV Intermittent every 8 hours  influenza   Vaccine 0.5 milliLiter(s) IntraMuscular once  levothyroxine 100 MICROGram(s) Oral daily  losartan 100 milliGRAM(s) Oral daily  metoprolol tartrate 50 milliGRAM(s) Oral two times a day  pantoprazole    Tablet 40 milliGRAM(s) Oral before breakfast  potassium phosphate / sodium phosphate powder 1 Packet(s) Oral three times a day with meals    MEDICATIONS  (PRN):  acetaminophen   Tablet .. 650 milliGRAM(s) Oral every 6 hours PRN Temp greater or equal to 38.5C (101.3F)  metoclopramide 5 milliGRAM(s) Oral Before meals and at bedtime PRN nausea and/or vomiting      Allergies    No Known Allergies    Intolerances        ROS: as above     Vital Signs Last 24 Hrs  T(C): 36.8 (19 Oct 2019 05:14), Max: 37.9 (18 Oct 2019 13:37)  T(F): 98.3 (19 Oct 2019 05:14), Max: 100.2 (18 Oct 2019 13:37)  HR: 79 (19 Oct 2019 09:45) (61 - 79)  BP: 129/62 (19 Oct 2019 09:45) (120/60 - 149/72)  BP(mean): --  RR: 18 (19 Oct 2019 05:14) (17 - 18)  SpO2: 97% (19 Oct 2019 09:45) (95% - 98%)    Physical Exam:   AAO x 3, NAD   RRR S1S2  CTA b/l   soft NTNDBS+  no c/c/e    LABS:                        7.3    1.80  )-----------( 100      ( 19 Oct 2019 07:15 )             22.7     10-    137  |  103  |  15  ----------------------------<  99  3.4<L>   |  24  |  0.89    Ca    7.5<L>      19 Oct 2019 07:15  Phos  1.5     10-19  Mg     1.8     10-19    TPro  5.4<L>  /  Alb  2.9<L>  /  TBili  0.4  /  DBili  x   /  AST  7<L>  /  ALT  10  /  AlkPhos  76  10-    PT/INR - ( 17 Oct 2019 17:33 )   PT: 14.4 sec;   INR: 1.25 ratio         PTT - ( 17 Oct 2019 17:33 )  PTT:27.9 sec  Urinalysis Basic - ( 17 Oct 2019 18:33 )    Color: Light Yellow / Appearance: Clear / S.019 / pH: x  Gluc: x / Ketone: Negative  / Bili: Negative / Urobili: Negative   Blood: x / Protein: Trace / Nitrite: Negative   Leuk Esterase: Negative / RBC: x / WBC x   Sq Epi: x / Non Sq Epi: x / Bacteria: x

## 2019-10-19 NOTE — PROGRESS NOTE ADULT - ASSESSMENT
In summary this is a 69 year old male with newly diagnosed GCB type diffuse large B-cell lymphoma (as of 9/2019) on R-CHOP since 10/10, rectal colitis in 8/2019, HTN, HLD, CAD s/p stent, hypothyroidism, h/o prostate CA (treated), DANIEL (not on CPAP), admitted for neutropenic fever. Found to be enterorhinovirus positive. WBC 0.3 (baseline 7), Hb of 7.4 (baseline 10.3),  (baseline 343), , hapto 235, negative UA. CXR reported clear.      #Neutropenic fever, s/p chemo for DLBCL  - found to have enterovirus, likely cause of fever  - Last chemo: 10/10/19, s/p Peg filgrastim. No need fo neupogen now  - Currently on Cefepime, would continue for now. Upon d/c would not send home with AB   - cultures negative thus far. Would continue to  monitor till neg x 48 hrs.   - prefer that pt stays till at least tomorrow given ANC still <1000 and 28% bands ( may be 2/2 neulasta but concern for severe infection)  - d/w pt and wife at bedside

## 2019-10-19 NOTE — PROGRESS NOTE ADULT - PROBLEM SELECTOR PLAN 6
-160s, sepsis appears controlled,   -c/w home metoprolol 50mg bid  -restart losartan 100mg when hemodynamically stable  -monitor bp -160s, sepsis resolved  -c/w home metoprolol 50mg bid  -restarted home losartan 100mg as   -monitor bp

## 2019-10-19 NOTE — PHYSICAL THERAPY INITIAL EVALUATION ADULT - PERTINENT HX OF CURRENT PROBLEM, REHAB EVAL
Pt is a 69 M with a PMH of HTN, HLD, CAD s/p stent, hypothyroidism, h/o prostate CA, DANIEL, B-cell lymphoma(diagnosed 9/19) on RCHOP that presented with neutropenic sepsis. (+) enterorhinovirus.

## 2019-10-19 NOTE — PHYSICAL THERAPY INITIAL EVALUATION ADULT - ADDITIONAL COMMENTS
Pt A&Ox4. Pt states that he lives with his wife in a split level home. 10 stairs (5-5). Pt is independent with all functional activities, ambulation, and ADLs. Pt works full time as an .

## 2019-10-19 NOTE — PROGRESS NOTE ADULT - PROBLEM SELECTOR PLAN 3
Hb 7.3 stable from 10/18, note Hb dropped from 8.4 to 7.2, no s/s of bleeding -likely chemo induced, dilutional from IVF  -trend cbc  -platelet downtrending, monitor  -Monitor anc daily- differential pending this AM, ctm  -c/w cefepime until ANC>500 per hematology Hb 7.3 stable from 10/18, note Hb dropped from 8.4 to 7.2, no s/s of bleeding -likely chemo induced, dilutional from IVF  -trend cbc  -platelet downtrending, monitor  -Monitor anc daily- differential pending this AM, ctm  -d/c cefepime, ANC>500 per hematology reccs Hb 7.3 stable from 10/18, note Hb dropped from 8.4 to 7.2, no s/s of bleeding -likely chemo induced, dilutional from IVF  -trend cbc  -platelet downtrending, monitor  -Monitor ANC daily  -c/w cefepime for now, goal ANC>500, per hematology reccs Hb 7-8, no s/s of bleeding -likely chemo induced, dilutional from IVF  -trend cbc  -platelet downtrending, no bleeding, monitor  -Monitor ANC daily, c/w cefepime for now

## 2019-10-19 NOTE — PROGRESS NOTE ADULT - PROBLEM SELECTOR PLAN 4
- newly diagnosed B-cell lymphoma (as of 9/2019) on R[facility]  - tumor lysis labs negative  -heme reccs appreciated  -outpatient follow up with Dr. Kingston - newly diagnosed B-cell lymphoma (as of 9/2019) on RCH[facility]  - tumor lysis labs negative  - heme reccs appreciated  -outpatient follow up with Dr. Kingston - newly diagnosed B-cell lymphoma (as of 9/2019) on R[facility]  - tumor lysis labs negative  - heme reccs appreciated  - outpatient follow up with Dr. Kingston

## 2019-10-19 NOTE — PROGRESS NOTE ADULT - PROBLEM SELECTOR PLAN 1
-currently afebrile. Fever on presentation, no focal symptoms, probably due to +enterorhinovirus infection, no clear sign of bacterial infection thus far  -c/w cefepime 2 gram q8 given neutropenia until ANC>500 per hematology  -BCx and UCx 10/17 NGTD  -trend CBC/ANC daily  -CXR and UA wnl  -HD stable, lactate wnl -currently afebrile. Fever on presentation, no focal symptoms, probably due to +enterorhinovirus infection, no clear sign of bacterial infection thus far  -c/w cefepime 2 gram q8, can d/c 10/19 as ANC now over 500, per hematology reccs  -BCx and UCx 10/17 NGTD  -trend CBC/ANC daily  -CXR and UA wnl  -HD stable, lactate wnl  -outpatient follow up with Dr. Kingston, CBC in one week -currently afebrile. Fever on presentation, no focal symptoms, probably due to +enterorhinovirus infection, no clear sign of bacterial infection thus far  -c/w cefepime 2 gram q8, ANC>500 (around 800) but with significant bands, continue for now per hematology reccs  -BCx and UCx 10/17 NGTD  -trend CBC/ANC daily  -CXR and UA wnl  -HD stable, lactate wnl  -outpatient follow up with Dr. Kingston, CBC in one week -Afebrile x36 hours, Fever on presentation, no focal symptoms, probably due to +enterorhinovirus infection, no clear sign of bacterial infection thus far  -c/w cefepime 2 gram q8 IV  -ANC around 800 but discussed with heme Dr Dee, wound monitor at least through conner, bands 28% may from neulasta vs severe infection   -BCx and UCx 10/17 NGTD  -trend CBC/ANC daily  -CXR and UA wnl  -HD stable, lactate wnl  -outpatient follow up with Dr. Kingston, CBC this week

## 2019-10-19 NOTE — PROGRESS NOTE ADULT - PROBLEM SELECTOR PLAN 5
s/p remove stent  - continue with ASA daily, may need to hold if platelet <50  - c/w metoprolol 50 mg BID

## 2019-10-19 NOTE — PROGRESS NOTE ADULT - PROBLEM SELECTOR PLAN 2
RVP positive for enterorhinovirus. CXR no infiltrate,  - continue with supportive care  - acetaminophen prn for fevers

## 2019-10-19 NOTE — PROGRESS NOTE ADULT - PROBLEM SELECTOR PLAN 10
- DVT: SCDs, ambulatory, PT eval  - Diet: DASH/TLC  Dispo: likely home with outpatient follow up 10/22 if stable - DVT: SCDs, ambulatory, PT eval  - Diet: DASH/TLC  Dispo: likely home with outpatient follow up in one week - DVT: SCDs, ambulatory, PT eval  - Diet: DASH/TLC  Dispo: likely home conner if ANC improved and no fevers

## 2019-10-20 ENCOUNTER — TRANSCRIPTION ENCOUNTER (OUTPATIENT)
Age: 69
End: 2019-10-20

## 2019-10-20 VITALS
RESPIRATION RATE: 18 BRPM | TEMPERATURE: 98 F | SYSTOLIC BLOOD PRESSURE: 133 MMHG | DIASTOLIC BLOOD PRESSURE: 71 MMHG | OXYGEN SATURATION: 96 % | HEART RATE: 63 BPM

## 2019-10-20 LAB
ANION GAP SERPL CALC-SCNC: 11 MMOL/L — SIGNIFICANT CHANGE UP (ref 5–17)
BUN SERPL-MCNC: 15 MG/DL — SIGNIFICANT CHANGE UP (ref 7–23)
CALCIUM SERPL-MCNC: 8.2 MG/DL — LOW (ref 8.4–10.5)
CHLORIDE SERPL-SCNC: 102 MMOL/L — SIGNIFICANT CHANGE UP (ref 96–108)
CO2 SERPL-SCNC: 24 MMOL/L — SIGNIFICANT CHANGE UP (ref 22–31)
CREAT SERPL-MCNC: 0.9 MG/DL — SIGNIFICANT CHANGE UP (ref 0.5–1.3)
GLUCOSE SERPL-MCNC: 101 MG/DL — HIGH (ref 70–99)
HCT VFR BLD CALC: 23.6 % — LOW (ref 39–50)
HGB BLD-MCNC: 7.6 G/DL — LOW (ref 13–17)
MAGNESIUM SERPL-MCNC: 1.9 MG/DL — SIGNIFICANT CHANGE UP (ref 1.6–2.6)
MCHC RBC-ENTMCNC: 23.7 PG — LOW (ref 27–34)
MCHC RBC-ENTMCNC: 32.2 GM/DL — SIGNIFICANT CHANGE UP (ref 32–36)
MCV RBC AUTO: 73.5 FL — LOW (ref 80–100)
NRBC # BLD: 0 /100 WBCS — SIGNIFICANT CHANGE UP (ref 0–0)
PHOSPHATE SERPL-MCNC: 1.7 MG/DL — LOW (ref 2.5–4.5)
PLATELET # BLD AUTO: 132 K/UL — LOW (ref 150–400)
POTASSIUM SERPL-MCNC: 3.6 MMOL/L — SIGNIFICANT CHANGE UP (ref 3.5–5.3)
POTASSIUM SERPL-SCNC: 3.6 MMOL/L — SIGNIFICANT CHANGE UP (ref 3.5–5.3)
RBC # BLD: 3.21 M/UL — LOW (ref 4.2–5.8)
RBC # FLD: 15.7 % — HIGH (ref 10.3–14.5)
SODIUM SERPL-SCNC: 137 MMOL/L — SIGNIFICANT CHANGE UP (ref 135–145)
WBC # BLD: 6.94 K/UL — SIGNIFICANT CHANGE UP (ref 3.8–10.5)
WBC # FLD AUTO: 6.94 K/UL — SIGNIFICANT CHANGE UP (ref 3.8–10.5)

## 2019-10-20 PROCEDURE — 84100 ASSAY OF PHOSPHORUS: CPT

## 2019-10-20 PROCEDURE — 80048 BASIC METABOLIC PNL TOTAL CA: CPT

## 2019-10-20 PROCEDURE — 97161 PT EVAL LOW COMPLEX 20 MIN: CPT

## 2019-10-20 PROCEDURE — 84295 ASSAY OF SERUM SODIUM: CPT

## 2019-10-20 PROCEDURE — 96375 TX/PRO/DX INJ NEW DRUG ADDON: CPT

## 2019-10-20 PROCEDURE — 96374 THER/PROPH/DIAG INJ IV PUSH: CPT

## 2019-10-20 PROCEDURE — 86803 HEPATITIS C AB TEST: CPT

## 2019-10-20 PROCEDURE — 85610 PROTHROMBIN TIME: CPT

## 2019-10-20 PROCEDURE — 93005 ELECTROCARDIOGRAM TRACING: CPT

## 2019-10-20 PROCEDURE — 82435 ASSAY OF BLOOD CHLORIDE: CPT

## 2019-10-20 PROCEDURE — 99285 EMERGENCY DEPT VISIT HI MDM: CPT | Mod: 25

## 2019-10-20 PROCEDURE — 87798 DETECT AGENT NOS DNA AMP: CPT

## 2019-10-20 PROCEDURE — 84132 ASSAY OF SERUM POTASSIUM: CPT

## 2019-10-20 PROCEDURE — 81003 URINALYSIS AUTO W/O SCOPE: CPT

## 2019-10-20 PROCEDURE — 71045 X-RAY EXAM CHEST 1 VIEW: CPT

## 2019-10-20 PROCEDURE — 83010 ASSAY OF HAPTOGLOBIN QUANT: CPT

## 2019-10-20 PROCEDURE — 83735 ASSAY OF MAGNESIUM: CPT

## 2019-10-20 PROCEDURE — 87486 CHLMYD PNEUM DNA AMP PROBE: CPT

## 2019-10-20 PROCEDURE — 85014 HEMATOCRIT: CPT

## 2019-10-20 PROCEDURE — 87086 URINE CULTURE/COLONY COUNT: CPT

## 2019-10-20 PROCEDURE — 99239 HOSP IP/OBS DSCHRG MGMT >30: CPT

## 2019-10-20 PROCEDURE — 87040 BLOOD CULTURE FOR BACTERIA: CPT

## 2019-10-20 PROCEDURE — 83605 ASSAY OF LACTIC ACID: CPT

## 2019-10-20 PROCEDURE — 82803 BLOOD GASES ANY COMBINATION: CPT

## 2019-10-20 PROCEDURE — 87633 RESP VIRUS 12-25 TARGETS: CPT

## 2019-10-20 PROCEDURE — 85027 COMPLETE CBC AUTOMATED: CPT

## 2019-10-20 PROCEDURE — 82947 ASSAY GLUCOSE BLOOD QUANT: CPT

## 2019-10-20 PROCEDURE — 82330 ASSAY OF CALCIUM: CPT

## 2019-10-20 PROCEDURE — 85730 THROMBOPLASTIN TIME PARTIAL: CPT

## 2019-10-20 PROCEDURE — 87581 M.PNEUMON DNA AMP PROBE: CPT

## 2019-10-20 PROCEDURE — 80053 COMPREHEN METABOLIC PANEL: CPT

## 2019-10-20 PROCEDURE — 84550 ASSAY OF BLOOD/URIC ACID: CPT

## 2019-10-20 PROCEDURE — 83615 LACTATE (LD) (LDH) ENZYME: CPT

## 2019-10-20 RX ADMIN — CEFEPIME 100 MILLIGRAM(S): 1 INJECTION, POWDER, FOR SOLUTION INTRAMUSCULAR; INTRAVENOUS at 02:04

## 2019-10-20 RX ADMIN — Medication 100 MICROGRAM(S): at 06:17

## 2019-10-20 RX ADMIN — PANTOPRAZOLE SODIUM 40 MILLIGRAM(S): 20 TABLET, DELAYED RELEASE ORAL at 06:18

## 2019-10-20 RX ADMIN — Medication 81 MILLIGRAM(S): at 11:50

## 2019-10-20 RX ADMIN — Medication 1 PACKET(S): at 08:48

## 2019-10-20 RX ADMIN — LOSARTAN POTASSIUM 100 MILLIGRAM(S): 100 TABLET, FILM COATED ORAL at 06:19

## 2019-10-20 RX ADMIN — Medication 1 PACKET(S): at 11:51

## 2019-10-20 RX ADMIN — Medication 50 MILLIGRAM(S): at 06:17

## 2019-10-20 RX ADMIN — Medication 300 MILLIGRAM(S): at 11:50

## 2019-10-20 RX ADMIN — CEFEPIME 100 MILLIGRAM(S): 1 INJECTION, POWDER, FOR SOLUTION INTRAMUSCULAR; INTRAVENOUS at 11:50

## 2019-10-20 NOTE — PROGRESS NOTE ADULT - PROBLEM SELECTOR PLAN 2
RVP positive for enterorhinovirus. CXR no infiltrate,  - continue with supportive care  - acetaminophen prn for fevers RVP positive for enterorhinovirus. CXR no infiltrate,  - acetaminophen prn for fevers

## 2019-10-20 NOTE — DISCHARGE NOTE PROVIDER - NSDCCPCAREPLAN_GEN_ALL_CORE_FT
PRINCIPAL DISCHARGE DIAGNOSIS  Diagnosis: Neutropenic sepsis  Assessment and Plan of Treatment: You initially presented with fever and low white blood cell count. This likely happened due to the entero/rhino virus infection. Your B-Cell Lymphoma and your chemotherapy makes you prone to getting infections and having a low white blood cell count. We gave you antibiotics to cover for any superimposed bacterial infection, given that you are currently predisposed to infection. After a few days, your white blood cell count improved and you did not spike any fevers. You blood pressure and heart rate remained stable as well. We spoke to the hematologist as well, and they recommended stopping the antibiotics when you leave the hospital. Please followup with Dr. Kingston on Monday 10/21 for your IV antibiotics.      SECONDARY DISCHARGE DIAGNOSES  Diagnosis: Diffuse large B-cell lymphoma of intra-abdominal lymph nodes  Assessment and Plan of Treatment: You were recently diagnosed with Diffuse large B-Cell lymphoma and started on chemotherapy on 10/10 (R-CHOP therapy). Please followup with your hematologist/oncologist for further chemotherapy and for iron supplementation. PRINCIPAL DISCHARGE DIAGNOSIS  Diagnosis: Neutropenic sepsis  Assessment and Plan of Treatment: You initially presented with fever and low white blood cell count. This likely happened due to the entero/rhino virus infection. Your B-Cell Lymphoma and your chemotherapy makes you prone to getting infections and having a low white blood cell count. We gave you antibiotics to cover for any superimposed bacterial infection. Your white blood cell count improved and you did not spike any fevers. You blood pressure and heart rate remained stable. We spoke to the hematologist, and they recommended stopping the antibiotics when you leave the hospital. Please followup with Dr. Kingston on Monday 10/21 for your IV antibiotics.      SECONDARY DISCHARGE DIAGNOSES  Diagnosis: Diffuse large B-cell lymphoma of intra-abdominal lymph nodes  Assessment and Plan of Treatment: You were recently diagnosed with Diffuse large B-Cell lymphoma and started on chemotherapy on 10/10 (R-CHOP therapy). Please followup with your hematologist/oncologist for further chemotherapy and for iron supplementation. PRINCIPAL DISCHARGE DIAGNOSIS  Diagnosis: Neutropenic sepsis  Assessment and Plan of Treatment: You initially presented with fever and low white blood cell count. This likely happened due to the entero/rhino virus infection. Your B-Cell Lymphoma and your chemotherapy makes you prone to getting infections and having a low white blood cell count. We gave you antibiotics to cover for any superimposed bacterial infection. Your white blood cell count improved and you did not spike any fevers. You blood pressure and heart rate remained stable. We spoke to the hematologist, and they recommended stopping the antibiotics when you leave the hospital. Please followup with Dr. Kingston on Monday 10/21      SECONDARY DISCHARGE DIAGNOSES  Diagnosis: CAD (coronary atherosclerotic disease)  Assessment and Plan of Treatment: continue meds  followup with your cardiologist    Diagnosis: Diffuse large B-cell lymphoma of intra-abdominal lymph nodes  Assessment and Plan of Treatment: You were recently diagnosed with Diffuse large B-Cell lymphoma and started on chemotherapy on 10/10 (R-CHOP therapy). Please followup with your hematologist/oncologist for further chemotherapy and for iron supplementation.    Diagnosis: Pancytopenia due to chemotherapy  Assessment and Plan of Treatment: improving    Diagnosis: Essential hypertension  Assessment and Plan of Treatment: continue meds  followup with pcp

## 2019-10-20 NOTE — DISCHARGE NOTE PROVIDER - CARE PROVIDER_API CALL
Adi Kingston)  Hematology; Internal Medicine; Medical Oncology  [address] [place] [address]  Phone: [phone]  Fax: [phone]  Follow Up Time: 1-3 days

## 2019-10-20 NOTE — DISCHARGE NOTE PROVIDER - NSDCFUSCHEDAPPT_GEN_ALL_CORE_FT
ANNA BATES ; 10/21/2019 ; [facility]  ANNA BATES ; 10/31/2019 ; [facility]  ANNA BATES ; 11/21/2019 ; [facility]  ANNA BATES ; 12/12/2019 ; [facility]

## 2019-10-20 NOTE — PROGRESS NOTE ADULT - PROBLEM SELECTOR PLAN 4
- newly diagnosed B-cell lymphoma (as of 9/2019) on RCHOP  - tumor lysis labs negative  - heme reccs appreciated  - outpatient follow up with Dr. Kingston

## 2019-10-20 NOTE — PROGRESS NOTE ADULT - PROBLEM SELECTOR PLAN 10
- DVT: SCDs, ambulatory, PT eval  - Diet: DASH/TLC  Dispo: likely home [facility] if ANC improved and no fevers - DVT: SCDs, ambulatory, PT eval  - Diet: DASH/TLC  Dispo: likely home today, outpatient follow up with Dr. Kingston - DVT: SCDs, ambulatory, PT eval  - Diet: DASH/TLC  Dispo discharge home today, outpatient f/u [facility] with Dr Kingston  spent 39 min on discharge process time

## 2019-10-20 NOTE — DISCHARGE NOTE PROVIDER - HOSPITAL COURSE
Mr. Hopson is a 69 year old male with HTN, HLD, CAD s/p stent, hypothyroidism, h/o prostate CA (treated), DANIEL (not on CPAP), newly diagnosed B-cell lymphoma (as of 9/2019) on RCHOP, presents with one-day history of neutropenic sepsis, positive for enterorhinovirus admitted for further management.         Problem/Plan - 1:    ·  Problem: Neutropenic sepsis.  Plan: -Afebrile >48 hours, Fever on presentation, no focal symptoms, likely due to +enterorhinovirus infection, no clear sign of bacterial infection thus far    - received cefepime 2 gram q8 IV. heme rec appreciated - will D/c on discharge    -ANC resolved today, WBC 6.94    -BCx and UCx 10/17 NGTD x 2days, afebrile, CXR and UA wnl, HD stable, lactate wnl    -outpatient follow up with Dr. Kingston, CBC this week and IV iron on Monday.             Problem/Plan - 2:    ·  Problem: Enterovirus infection.  Plan: RVP positive for enterorhinovirus. CXR no infiltrate,    - resolved         Problem/Plan - 3:    ·  Problem: Pancytopenia due to chemotherapy.  Plan: Hb 7-8, no s/s of bleeding -likely chemo induced, dilutional from IVF    -Remains stable, outpatient f/u with Dr. Kingston         Problem/Plan - 4:    ·  Problem: Diffuse large B-cell lymphoma of intra-abdominal lymph nodes.  Plan: - newly diagnosed B-cell lymphoma (as of 9/2019) on RCHOP    - tumor lysis labs negative    - heme recs appreciated    - outpatient follow up with Dr. Kingston.          Problem/Plan - 5:    ·  Problem: Coronary artery disease involving native coronary artery of native heart without angina pectoris.  Plan: s/p remove stent    - continue with ASA daily, may need to hold if platelet <50    - c/w metoprolol 50 mg BID.          Problem/Plan - 6:    Problem: Essential hypertension. Plan: -160s, sepsis resolved    -c/w home metoprolol 50mg bid    -restarted home losartan 100mg as          Problem/Plan - 7:    ·  Problem: Hyperlipidemia, unspecified hyperlipidemia type.  Plan: - continue lipitor 40 mg daily.          Problem/Plan - 8:    ·  Problem: Hypothyroidism, unspecified type.  Plan: - c/w home synthroid 100mcg daily.          Problem/Plan - 9:    ·  Problem: Obstructive sleep apnea syndrome.  Plan: - not on CPAP at home.          Problem/Plan - 10:    Problem: Need for prophylactic measure. Plan; - DVT: SCDs,+ ambulatory    - Diet: DASH/TLC        Dispo: home today, outpatient follow up with Dr. Kingston. Mr. Hopson is a 69 year old male with HTN, HLD, CAD s/p stent, hypothyroidism, h/o prostate CA (treated), DANIEL (not on CPAP), newly diagnosed B-cell lymphoma (as of 9/2019) on RCHOP, presents with one-day history of neutropenic sepsis, positive for enterorhinovirus admitted for further management.         Problem/Plan - 1:    ·  Problem: Neutropenic sepsis.  Plan: -Afebrile >48 hours, Fever on presentation, no focal symptoms, likely due to +enterorhinovirus infection, no clear sign of bacterial infection thus far    - received cefepime 2 gram q8 IV. heme rec appreciated - will D/c on discharge    -ANC improved today, WBC 6.94    -BCx and UCx 10/17 NGTD x 2days, afebrile, CXR and UA wnl, HD stable, lactate wnl    -outpatient follow up with Dr. Kingston, CBC this week and IV iron on Monday.             Problem/Plan - 2:    ·  Problem: Enterovirus infection.  Plan: RVP positive for enterorhinovirus. CXR no infiltrate,    - resolved         Problem/Plan - 3:    ·  Problem: Pancytopenia due to chemotherapy.  Plan: Hb 7-8, no s/s of bleeding -likely chemo induced, dilutional from IVF    -Remains stable, outpatient f/u with Dr. Kingston         Problem/Plan - 4:    ·  Problem: Diffuse large B-cell lymphoma of intra-abdominal lymph nodes.  Plan: - newly diagnosed B-cell lymphoma (as of 9/2019) on RCHOP    - tumor lysis labs negative    - heme recs appreciated    - outpatient follow up with Dr. Kingston.          Problem/Plan - 5:    ·  Problem: Coronary artery disease involving native coronary artery of native heart without angina pectoris.  Plan: s/p remove stent    - platelets improved, continue with ASA daily, c/w metoprolol 50 mg BID.          Problem/Plan - 6:    Problem: Essential hypertension. Plan: -160s, sepsis resolved    -c/w home metoprolol 50mg bid    -restarted home losartan 100mg as          Problem/Plan - 7:    ·  Problem: Hyperlipidemia, unspecified hyperlipidemia type.  Plan: - continue lipitor 40 mg daily.          Problem/Plan - 8:    ·  Problem: Hypothyroidism, unspecified type.  Plan: - c/w home synthroid 100mcg daily.          Problem/Plan - 9:    ·  Problem: Obstructive sleep apnea syndrome.  Plan: - not on CPAP at home.          Problem/Plan - 10:    Problem: Need for prophylactic measure. Plan; - DVT: SCDs,+ ambulatory    - Diet: DASH/TLC        Dispo: home today, outpatient follow up with Dr. Kingston. 69 year old male with HTN, HLD, CAD s/p stent, hypothyroidism, h/o prostate CA (treated), DANIEL (not on CPAP), newly diagnosed B-cell lymphoma (as of 9/2019) on RCHOP, presents with one-day history of neutropenic sepsis, positive for enterorhinovirus admitted for further management.         Problem/Plan - 1:    ·  Problem: Neutropenic sepsis.  Plan: neutropenic sepsis due to  +enterorhinovirus infection, no clear sign of bacterial infection    -Afebrile >48 hours,, no focal symptoms, HD stable    -c/w cefepime 2 gram q8 IV, stop as neutropenia resolved and no fever    -BCx and UCx 10/17 NGTD x 2days, afebrile.     -CXR and UA wnl    -HD stable, lactate wnl    -discussed with ermias Dee, can discharge home    -outpatient follow up with Dr. Kingston, CBC and IV iron on Monday ([facility]).             Problem/Plan - 2:    ·  Problem: Enterovirus infection.  Plan: RVP positive for enterorhinovirus. CXR no infiltrate,    - acetaminophen prn for fevers.             Problem/Plan - 3:    ·  Problem: Pancytopenia due to chemotherapy.  Plan: Hb 7-8, no s/s of bleeding -likely chemo induced, dilutional from IVF    -trend cbc    -platelet stable, no bleednig    -neutropenia resolved.             Problem/Plan - 4:    ·  Problem: Diffuse large B-cell lymphoma of intra-abdominal lymph nodes.  Plan: - newly diagnosed B-cell lymphoma (as of 9/2019) on RCHOP    - tumor lysis labs negative    - heme reccs appreciated    - outpatient follow up with Dr. Kingston.          Problem/Plan - 5:    ·  Problem: Coronary artery disease involving native coronary artery of native heart without angina pectoris.  Plan: s/p remove stent    - continue with ASA daily, may need to hold if platelet <50    - c/w metoprolol 50 mg BID.          Problem/Plan - 6:    Problem: Essential hypertension. Plan: -160s, sepsis resolved    -c/w home metoprolol 50mg bid    -c/w home losartan 100mg.             Problem/Plan - 7:    ·  Problem: Hyperlipidemia, unspecified hyperlipidemia type.  Plan: - continue lipitor 40 mg daily.          Problem/Plan - 8:    ·  Problem: Hypothyroidism, unspecified type.  Plan: - c/w home synthroid 100mcg daily.          Problem/Plan - 9:    ·  Problem: Obstructive sleep apnea syndrome.  Plan: - not on CPAP at home.          Problem/Plan - 10:    Problem: Need for prophylactic measure. Plan; - DVT: SCDs, ambulatory, PT eval    - Diet: DASH/TLC    Dispo discharge home today, outpatient f/u [facility] with Dr Kingston    spent 39 min on discharge process time.

## 2019-10-20 NOTE — PROGRESS NOTE ADULT - PROBLEM SELECTOR PLAN 6
-160s, sepsis resolved  -c/w home metoprolol 50mg bid  -restarted home losartan 100mg as   -monitor bp -160s, sepsis resolved  -c/w home metoprolol 50mg bid  -c/w home losartan 100mg

## 2019-10-20 NOTE — PROGRESS NOTE ADULT - PROBLEM SELECTOR PLAN 1
-Afebrile x36 hours, Fever on presentation, no focal symptoms, probably due to +enterorhinovirus infection, no clear sign of bacterial infection thus far  -c/w cefepime 2 gram q8 IV. heme rec appreciated - D/c on discharge  -ANC around 800 yesterday but discussed with ermias Dee, wound monitor at least through [facility], bands 28% may from neulasta vs severe infection   -WBC today 6.94  -BCx and UCx 10/17 NGTD x 2days, afebrile.   -trend CBC/ANC daily  -CXR and UA wnl  -HD stable, lactate wnl  -outpatient follow up with Dr. Kingston, CBC this week -Afebrile >48 hours, Fever on presentation, no focal symptoms, probably due to +enterorhinovirus infection, no clear sign of bacterial infection thus far  -c/w cefepime 2 gram q8 IV. heme rec appreciated - D/c on discharge  -ANC around 800 yesterday but discussed with ermias Dee, wound monitor at least through [facility], bands 28% may from neulasta vs severe infection   -WBC today 6.94  -BCx and UCx 10/17 NGTD x 2days, afebrile.   -trend CBC/ANC daily  -CXR and UA wnl  -HD stable, lactate wnl  -outpatient follow up with Dr. Kingston, CBC this week and IV iron on Monday. neutropenic sepsis due to  +enterorhinovirus infection, no clear sign of bacterial infection  -Afebrile >48 hours,, no focal symptoms, HD stable  -c/w cefepime 2 gram q8 IV, stop as neutropenia resolved and no fever  -BCx and UCx 10/17 NGTD x 2days, afebrile.   -CXR and UA wnl  -HD stable, lactate wnl  -discussed with heme Dr Dee, can discharge home  -outpatient follow up with Dr. Kingston, CBC and IV iron on Monday ([facility])

## 2019-10-20 NOTE — PROGRESS NOTE ADULT - PROBLEM SELECTOR PLAN 3
Hb 7-8, no s/s of bleeding -likely chemo induced, dilutional from IVF  -trend cbc  -platelet downtrending, no bleeding, monitor  -Monitor ANC daily, c/w cefepime for now Hb 7-8, no s/s of bleeding -likely chemo induced, dilutional from IVF  -trend cbc  -platelet downtrending, no bleeding, monitor  -Monitor ANC daily, c/w cefepime for now, stop on discharge Hb 7-8, no s/s of bleeding -likely chemo induced, dilutional from IVF  -trend cbc  -platelet stable, no bleednig  -neutropenia resolved

## 2019-10-20 NOTE — DISCHARGE NOTE PROVIDER - NSCORESITESY/N_GEN_A_CORE_RD
[facility] EMERGENCY DEPT 
[address] [place] [address] 
[phone] Work/School Note Date: 5/9/2019 To Whom It May concern: 
 
Noris Thornton was seen and treated today in the emergency room by the following provider(s): 
No providers found. Noris Thornton may return to school on 5/10/19. Activity as tolerated without pain until follow-up with primary care. Sincerely, Tayler Pineda MD 
 
 
 
 No

## 2019-10-20 NOTE — DISCHARGE NOTE NURSING/CASE MANAGEMENT/SOCIAL WORK - PATIENT PORTAL LINK FT
You can access the FollowMyHealth Patient Portal offered by [facility] by registering at the following website: [email or web address]. By joining [employer]’s FollowMyHealth portal, you will also be able to view your health information using other applications (apps) compatible with our system.

## 2019-10-20 NOTE — PROGRESS NOTE ADULT - ASSESSMENT
69 year old male with HTN, HLD, CAD s/p stent, hypothyroidism, h/o prostate CA (treated), DANIEL (not on CPAP), newly diagnosed B-cell lymphoma (as of 9/2019) on RCHOP, presents with one-day history of neutropenic sepsis, positive for enterorhinovirus admitted for further management.

## 2019-10-20 NOTE — PROGRESS NOTE ADULT - SUBJECTIVE AND OBJECTIVE BOX
***************************************************************  Stella Rockdale, PGY1  Internal Medicine   pager: [phone]  ***************************************************************    ANNA BATES  69y  MRN: [id number]    Patient is a 69y old  Male who presents with a chief complaint of Neutropenic Fever (19 Oct 2019 12:48)      Subjective: no events ON. Denies fever, CP, SOB, abn pain, N/V, dysuria. Tolerating diet.      MEDICATIONS  (STANDING):  allopurinol 300 milliGRAM(s) Oral daily  aspirin enteric coated 81 milliGRAM(s) Oral daily  atorvastatin 40 milliGRAM(s) Oral at bedtime  cefepime   IVPB 2000 milliGRAM(s) IV Intermittent every 8 hours  influenza   Vaccine 0.5 milliLiter(s) IntraMuscular once  levothyroxine 100 MICROGram(s) Oral daily  losartan 100 milliGRAM(s) Oral daily  metoprolol tartrate 50 milliGRAM(s) Oral two times a day  pantoprazole    Tablet 40 milliGRAM(s) Oral before breakfast  potassium phosphate / sodium phosphate powder 1 Packet(s) Oral three times a day with meals    MEDICATIONS  (PRN):  acetaminophen   Tablet .. 650 milliGRAM(s) Oral every 6 hours PRN Temp greater or equal to 38.5C (101.3F)  metoclopramide 5 milliGRAM(s) Oral Before meals and at bedtime PRN nausea and/or vomiting      Objective:    Vitals: Vital Signs Last 24 Hrs  T(C): 36.7 (10-20-19 @ 04:56), Max: 37.1 (10-19-19 @ 22:04)  T(F): 98 (10-20-19 @ 04:56), Max: 98.7 (10-19-19 @ 22:04)  HR: 63 (10-20-19 @ 04:56) (58 - 79)  BP: 159/79 (10-20-19 @ 04:56) (129/62 - 159/79)  BP(mean): --  RR: 18 (10-20-19 @ 04:56) (18 - 18)  SpO2: 98% (10-20-19 @ 04:56) (97% - 98%)            I&O's Summary    19 Oct 2019 07:01  -  20 Oct 2019 07:00  --------------------------------------------------------  IN: 50 mL / OUT: 300 mL / NET: -250 mL         PHYSICAL EXAM:    General: Not in acute distress, pleasent demeanor  	Head: Normocephalic, Atraumatic  	Throat: no pharyngeal erythema   	Respiratory: CTAB, normal respiratory effort, no wheezes, crackles, rales  	CV: RRR, S1/S2,   	Abdominal: Soft, bowel sounds present, NT, ND, nontender, circular mass palpated  	Extremities: No edema, 2+ DP pulses  	Neurological: A&Ox3    LABS:  10-20    137  |  102  |  15  ----------------------------<  101<H>  3.6   |  24  |  0.90  10-19    137  |  103  |  15  ----------------------------<  99  3.4<L>   |  24  |  0.89  10-18    135  |  99  |  16  ----------------------------<  110<H>  3.8   |  23  |  0.90    Ca    8.2<L>      20 Oct 2019 06:59  Ca    7.5<L>      19 Oct 2019 07:15  Ca    8.2<L>      18 Oct 2019 06:59  Phos  1.7     10-20  Mg     1.9     10-20    TPro  5.4<L>  /  Alb  2.9<L>  /  TBili  0.4  /  DBili  x   /  AST  7<L>  /  ALT  10  /  AlkPhos  76  10-18  TPro  5.7<L>  /  Alb  3.2<L>  /  TBili  0.5  /  DBili  x   /  AST  6<L>  /  ALT  10  /  AlkPhos  86  10-17  TPro  6.1  /  Alb  3.9  /  TBili  0.6  /  DBili  x   /  AST  7<L>  /  ALT  12  /  AlkPhos  106  10-17                                              7.6    6.94  )-----------( 132      ( 20 Oct 2019 06:59 )             23.6                         7.3    1.80  )-----------( 100      ( 19 Oct 2019 07:15 )             22.7                         7.4    0.30  )-----------( 122      ( 18 Oct 2019 06:59 )             23.1     CAPILLARY BLOOD GLUCOSE          RADIOLOGY & ADDITIONAL TESTS:    Imaging Personally Reviewed:  [x ] YES  [ ] NO    Consultants involved in case:   Consultant(s) Notes Reviewed:  [ x] YES  [ ] NO:   Care Discussed with Consultants/Other Providers [x ] YES  [ ] NO ***************************************************************  Stella Rockdale, PGY1  Internal Medicine   pager: [phone]  ***************************************************************    ANNA BATES  69y  MRN: [id number]    Patient is a 69y old  Male who presents with a chief complaint of Neutropenic Fever (19 Oct 2019 12:48)      Subjective: no events ON. Denies fever, CP, SOB, abn pain, N/V, dysuria. Tolerating diet.  Would like to leave this morning.     MEDICATIONS  (STANDING):  allopurinol 300 milliGRAM(s) Oral daily  aspirin enteric coated 81 milliGRAM(s) Oral daily  atorvastatin 40 milliGRAM(s) Oral at bedtime  cefepime   IVPB 2000 milliGRAM(s) IV Intermittent every 8 hours  influenza   Vaccine 0.5 milliLiter(s) IntraMuscular once  levothyroxine 100 MICROGram(s) Oral daily  losartan 100 milliGRAM(s) Oral daily  metoprolol tartrate 50 milliGRAM(s) Oral two times a day  pantoprazole    Tablet 40 milliGRAM(s) Oral before breakfast  potassium phosphate / sodium phosphate powder 1 Packet(s) Oral three times a day with meals    MEDICATIONS  (PRN):  acetaminophen   Tablet .. 650 milliGRAM(s) Oral every 6 hours PRN Temp greater or equal to 38.5C (101.3F)  metoclopramide 5 milliGRAM(s) Oral Before meals and at bedtime PRN nausea and/or vomiting      Objective:    Vitals: Vital Signs Last 24 Hrs  T(C): 36.7 (10-20-19 @ 04:56), Max: 37.1 (10-19-19 @ 22:04)  T(F): 98 (10-20-19 @ 04:56), Max: 98.7 (10-19-19 @ 22:04)  HR: 63 (10-20-19 @ 04:56) (58 - 79)  BP: 159/79 (10-20-19 @ 04:56) (129/62 - 159/79)  BP(mean): --  RR: 18 (10-20-19 @ 04:56) (18 - 18)  SpO2: 98% (10-20-19 @ 04:56) (97% - 98%)            I&O's Summary    19 Oct 2019 07:01  -  20 Oct 2019 07:00  --------------------------------------------------------  IN: 50 mL / OUT: 300 mL / NET: -250 mL        PHYSICAL EXAM:  General: Not in acute distress, pleasent demeanor  Head: Normocephalic, Atraumatic  Throat: no pharyngeal erythema   Respiratory: CTAB, normal respiratory effort, no wheezes, crackles, rales  CV: RRR, S1/S2,   Abdominal: Soft, bowel sounds present, NT, ND, nontender, circular mass palpated  Extremities: No edema, 2+ DP pulses  Neurological: A&Ox3    LABS:  10-20    137  |  102  |  15  ----------------------------<  101<H>  3.6   |  24  |  0.90  10-19    137  |  103  |  15  ----------------------------<  99  3.4<L>   |  24  |  0.89  10-18    135  |  99  |  16  ----------------------------<  110<H>  3.8   |  23  |  0.90    Ca    8.2<L>      20 Oct 2019 06:59  Ca    7.5<L>      19 Oct 2019 07:15  Ca    8.2<L>      18 Oct 2019 06:59  Phos  1.7     10-20  Mg     1.9     10-20    TPro  5.4<L>  /  Alb  2.9<L>  /  TBili  0.4  /  DBili  x   /  AST  7<L>  /  ALT  10  /  AlkPhos  76  10-18  TPro  5.7<L>  /  Alb  3.2<L>  /  TBili  0.5  /  DBili  x   /  AST  6<L>  /  ALT  10  /  AlkPhos  86  10-17  TPro  6.1  /  Alb  3.9  /  TBili  0.6  /  DBili  x   /  AST  7<L>  /  ALT  12  /  AlkPhos  106  10-17                                              7.6    6.94  )-----------( 132      ( 20 Oct 2019 06:59 )             23.6                         7.3    1.80  )-----------( 100      ( 19 Oct 2019 07:15 )             22.7                         7.4    0.30  )-----------( 122      ( 18 Oct 2019 06:59 )             23.1     CAPILLARY BLOOD GLUCOSE          RADIOLOGY & ADDITIONAL TESTS:    Imaging Personally Reviewed:  [x ] YES  [ ] NO    Consultants involved in case:   Consultant(s) Notes Reviewed:  [ x] YES  [ ] NO:   Care Discussed with Consultants/Other Providers [x ] YES  [ ] NO

## 2019-10-21 ENCOUNTER — APPOINTMENT (OUTPATIENT)
Dept: INFUSION THERAPY | Facility: HOSPITAL | Age: 69
End: 2019-10-21

## 2019-10-22 LAB
CULTURE RESULTS: SIGNIFICANT CHANGE UP
CULTURE RESULTS: SIGNIFICANT CHANGE UP
SPECIMEN SOURCE: SIGNIFICANT CHANGE UP
SPECIMEN SOURCE: SIGNIFICANT CHANGE UP

## 2019-10-23 PROBLEM — E03.9 HYPOTHYROIDISM, UNSPECIFIED: Chronic | Status: ACTIVE | Noted: 2019-10-17

## 2019-10-23 LAB
APPEARANCE: CLEAR
BACTERIA BLD CULT: ABNORMAL
BACTERIA UR CULT: NORMAL
BACTERIA: NEGATIVE
BILIRUBIN URINE: NEGATIVE
BLOOD URINE: NEGATIVE
COLOR: YELLOW
GLUCOSE QUALITATIVE U: NEGATIVE
HYALINE CASTS: 5 /LPF
KETONES URINE: NEGATIVE
LEUKOCYTE ESTERASE URINE: NEGATIVE
MICROSCOPIC-UA: NORMAL
NITRITE URINE: NEGATIVE
PH URINE: 7
PROTEIN URINE: NORMAL
RED BLOOD CELLS URINE: 2 /HPF
SPECIFIC GRAVITY URINE: 1.03
SQUAMOUS EPITHELIAL CELLS: 2 /HPF
UROBILINOGEN URINE: NORMAL
WHITE BLOOD CELLS URINE: 1 /HPF

## 2019-10-24 ENCOUNTER — RESULT REVIEW (OUTPATIENT)
Age: 69
End: 2019-10-24

## 2019-10-24 ENCOUNTER — APPOINTMENT (OUTPATIENT)
Dept: HEMATOLOGY ONCOLOGY | Facility: CLINIC | Age: 69
End: 2019-10-24
Payer: COMMERCIAL

## 2019-10-24 VITALS
RESPIRATION RATE: 16 BRPM | BODY MASS INDEX: 25.39 KG/M2 | DIASTOLIC BLOOD PRESSURE: 71 MMHG | HEART RATE: 54 BPM | OXYGEN SATURATION: 100 % | WEIGHT: 149.25 LBS | SYSTOLIC BLOOD PRESSURE: 196 MMHG | TEMPERATURE: 97.1 F

## 2019-10-24 LAB
BASOPHILS # BLD AUTO: 0 K/UL — SIGNIFICANT CHANGE UP (ref 0–0.2)
BASOPHILS NFR BLD AUTO: 0.6 % — SIGNIFICANT CHANGE UP (ref 0–2)
EOSINOPHIL # BLD AUTO: 0 K/UL — SIGNIFICANT CHANGE UP (ref 0–0.5)
EOSINOPHIL NFR BLD AUTO: 0.5 % — SIGNIFICANT CHANGE UP (ref 0–6)
HCT VFR BLD CALC: 31.2 % — LOW (ref 39–50)
HGB BLD-MCNC: 10.2 G/DL — LOW (ref 13–17)
LYMPHOCYTES # BLD AUTO: 0.8 K/UL — LOW (ref 1–3.3)
LYMPHOCYTES # BLD AUTO: 9.1 % — LOW (ref 13–44)
MCHC RBC-ENTMCNC: 25.3 PG — LOW (ref 27–34)
MCHC RBC-ENTMCNC: 32.8 G/DL — SIGNIFICANT CHANGE UP (ref 32–36)
MCV RBC AUTO: 76.9 FL — LOW (ref 80–100)
MONOCYTES # BLD AUTO: 0.4 K/UL — SIGNIFICANT CHANGE UP (ref 0–0.9)
MONOCYTES NFR BLD AUTO: 4.4 % — SIGNIFICANT CHANGE UP (ref 2–14)
NEUTROPHILS # BLD AUTO: 7.7 K/UL — HIGH (ref 1.8–7.4)
NEUTROPHILS NFR BLD AUTO: 85.3 % — HIGH (ref 43–77)
PLATELET # BLD AUTO: 365 K/UL — SIGNIFICANT CHANGE UP (ref 150–400)
RBC # BLD: 4.05 M/UL — LOW (ref 4.2–5.8)
RBC # FLD: 16.1 % — HIGH (ref 10.3–14.5)
WBC # BLD: 9.1 K/UL — SIGNIFICANT CHANGE UP (ref 3.8–10.5)
WBC # FLD AUTO: 9.1 K/UL — SIGNIFICANT CHANGE UP (ref 3.8–10.5)

## 2019-10-24 PROCEDURE — 99214 OFFICE O/P EST MOD 30 MIN: CPT

## 2019-10-24 NOTE — ADDENDUM
[FreeTextEntry1] : Documented by Amarjit Holly acting as a scribe for Dr. Adi Kingston on 10/24/2019\par \par All medical record entries made by a scribe were at my, Dr. Adi Kingston direction and personally dictated by me on 10/24/2019 . I have reviewed the chart and agree that the record accurately reflects my personal performance of the history, physical exam, procedure and imaging.\par

## 2019-10-24 NOTE — REASON FOR VISIT
[Follow-Up Visit] : a follow-up visit for [Spouse] : spouse [FreeTextEntry2] : DLBCL, non DH, GCB type

## 2019-10-24 NOTE — REVIEW OF SYSTEMS
[Patient Intake Form Reviewed] : Patient intake form was reviewed [Fever] : fever [Chills] : chills [Recent Change In Weight] : ~T recent weight change [Fatigue] : fatigue [Negative] : Allergic/Immunologic [Night Sweats] : no night sweats [Abdominal Pain] : no abdominal pain [Vomiting] : no vomiting [Constipation] : no constipation [Diarrhea] : no diarrhea [Dysuria] : no dysuria [FreeTextEntry2] : 164 lb in 6/19, now 156 lb [FreeTextEntry7] : GERD symptoms decr by omeprazole [FreeTextEntry8] : prostate ca as above

## 2019-10-24 NOTE — PHYSICAL EXAM
[Restricted in physically strenuous activity but ambulatory and able to carry out work of a light or sedentary nature] : Status 1- Restricted in physically strenuous activity but ambulatory and able to carry out work of a light or sedentary nature, e.g., light house work, office work [Normal] : affect appropriate [de-identified] : Firm mass LLQ no longer tender and much smaller.   [de-identified] : no CVAT

## 2019-10-28 ENCOUNTER — FORM ENCOUNTER (OUTPATIENT)
Age: 69
End: 2019-10-28

## 2019-10-29 ENCOUNTER — OUTPATIENT (OUTPATIENT)
Dept: OUTPATIENT SERVICES | Facility: HOSPITAL | Age: 69
LOS: 1 days | End: 2019-10-29
Payer: COMMERCIAL

## 2019-10-29 DIAGNOSIS — C83.30 DIFFUSE LARGE B-CELL LYMPHOMA, UNSPECIFIED SITE: ICD-10-CM

## 2019-10-29 PROCEDURE — C1769: CPT

## 2019-10-29 PROCEDURE — C1894: CPT

## 2019-10-29 PROCEDURE — 36561 INSERT TUNNELED CV CATH: CPT

## 2019-10-29 PROCEDURE — C1788: CPT

## 2019-10-29 PROCEDURE — 76937 US GUIDE VASCULAR ACCESS: CPT

## 2019-10-29 PROCEDURE — 77001 FLUOROGUIDE FOR VEIN DEVICE: CPT | Mod: 26

## 2019-10-29 PROCEDURE — 76937 US GUIDE VASCULAR ACCESS: CPT | Mod: 26

## 2019-10-29 PROCEDURE — 77001 FLUOROGUIDE FOR VEIN DEVICE: CPT

## 2019-10-30 ENCOUNTER — INBOUND DOCUMENT (OUTPATIENT)
Age: 69
End: 2019-10-30

## 2019-10-31 ENCOUNTER — LABORATORY RESULT (OUTPATIENT)
Age: 69
End: 2019-10-31

## 2019-10-31 ENCOUNTER — RESULT REVIEW (OUTPATIENT)
Age: 69
End: 2019-10-31

## 2019-10-31 ENCOUNTER — APPOINTMENT (OUTPATIENT)
Dept: INFUSION THERAPY | Facility: HOSPITAL | Age: 69
End: 2019-10-31

## 2019-10-31 DIAGNOSIS — Z45.2 ENCOUNTER FOR ADJUSTMENT AND MANAGEMENT OF VASCULAR ACCESS DEVICE: ICD-10-CM

## 2019-10-31 LAB
BASOPHILS # BLD AUTO: 0 K/UL — SIGNIFICANT CHANGE UP (ref 0–0.2)
BASOPHILS NFR BLD AUTO: 0.5 % — SIGNIFICANT CHANGE UP (ref 0–2)
EOSINOPHIL # BLD AUTO: 0.1 K/UL — SIGNIFICANT CHANGE UP (ref 0–0.5)
EOSINOPHIL NFR BLD AUTO: 1.4 % — SIGNIFICANT CHANGE UP (ref 0–6)
HCT VFR BLD CALC: 33.3 % — LOW (ref 39–50)
HGB BLD-MCNC: 11.1 G/DL — LOW (ref 13–17)
LYMPHOCYTES # BLD AUTO: 0.6 K/UL — LOW (ref 1–3.3)
LYMPHOCYTES # BLD AUTO: 8.2 % — LOW (ref 13–44)
MCHC RBC-ENTMCNC: 26.2 PG — LOW (ref 27–34)
MCHC RBC-ENTMCNC: 33.4 G/DL — SIGNIFICANT CHANGE UP (ref 32–36)
MCV RBC AUTO: 78.3 FL — LOW (ref 80–100)
MONOCYTES # BLD AUTO: 0.4 K/UL — SIGNIFICANT CHANGE UP (ref 0–0.9)
MONOCYTES NFR BLD AUTO: 5.3 % — SIGNIFICANT CHANGE UP (ref 2–14)
NEUTROPHILS # BLD AUTO: 6.6 K/UL — SIGNIFICANT CHANGE UP (ref 1.8–7.4)
NEUTROPHILS NFR BLD AUTO: 84.7 % — HIGH (ref 43–77)
PLATELET # BLD AUTO: 379 K/UL — SIGNIFICANT CHANGE UP (ref 150–400)
RBC # BLD: 4.26 M/UL — SIGNIFICANT CHANGE UP (ref 4.2–5.8)
RBC # FLD: 20.5 % — HIGH (ref 10.3–14.5)
WBC # BLD: 7.8 K/UL — SIGNIFICANT CHANGE UP (ref 3.8–10.5)
WBC # FLD AUTO: 7.8 K/UL — SIGNIFICANT CHANGE UP (ref 3.8–10.5)

## 2019-11-01 DIAGNOSIS — E86.0 DEHYDRATION: ICD-10-CM

## 2019-11-01 DIAGNOSIS — Z51.11 ENCOUNTER FOR ANTINEOPLASTIC CHEMOTHERAPY: ICD-10-CM

## 2019-11-01 DIAGNOSIS — Z51.89 ENCOUNTER FOR OTHER SPECIFIED AFTERCARE: ICD-10-CM

## 2019-11-01 DIAGNOSIS — R11.2 NAUSEA WITH VOMITING, UNSPECIFIED: ICD-10-CM

## 2019-11-07 ENCOUNTER — RESULT REVIEW (OUTPATIENT)
Age: 69
End: 2019-11-07

## 2019-11-07 ENCOUNTER — APPOINTMENT (OUTPATIENT)
Dept: HEMATOLOGY ONCOLOGY | Facility: CLINIC | Age: 69
End: 2019-11-07
Payer: COMMERCIAL

## 2019-11-07 VITALS
RESPIRATION RATE: 15 BRPM | TEMPERATURE: 97.9 F | BODY MASS INDEX: 25.39 KG/M2 | SYSTOLIC BLOOD PRESSURE: 146 MMHG | OXYGEN SATURATION: 100 % | HEART RATE: 74 BPM | DIASTOLIC BLOOD PRESSURE: 75 MMHG | WEIGHT: 149.25 LBS

## 2019-11-07 DIAGNOSIS — K63.9 DISEASE OF INTESTINE, UNSPECIFIED: ICD-10-CM

## 2019-11-07 DIAGNOSIS — Z51.11 ENCOUNTER FOR ANTINEOPLASTIC CHEMOTHERAPY: ICD-10-CM

## 2019-11-07 LAB
ALBUMIN SERPL ELPH-MCNC: 4.3 G/DL
ALP BLD-CCNC: 132 U/L
ALT SERPL-CCNC: 29 U/L
ANION GAP SERPL CALC-SCNC: 15 MMOL/L
AST SERPL-CCNC: 21 U/L
BACTERIA BLD CULT: NORMAL
BILIRUB SERPL-MCNC: 0.2 MG/DL
BUN SERPL-MCNC: 17 MG/DL
CALCIUM SERPL-MCNC: 9.5 MG/DL
CHLORIDE SERPL-SCNC: 102 MMOL/L
CO2 SERPL-SCNC: 23 MMOL/L
CREAT SERPL-MCNC: 1.05 MG/DL
GLUCOSE SERPL-MCNC: 103 MG/DL
HCT VFR BLD CALC: 31.5 % — LOW (ref 39–50)
HGB BLD-MCNC: 9.9 G/DL — LOW (ref 13–17)
LDH SERPL-CCNC: 270 U/L
MCHC RBC-ENTMCNC: 24.8 PG — LOW (ref 27–34)
MCHC RBC-ENTMCNC: 31.4 G/DL — LOW (ref 32–36)
MCV RBC AUTO: 79.2 FL — LOW (ref 80–100)
PLATELET # BLD AUTO: 163 K/UL — SIGNIFICANT CHANGE UP (ref 150–400)
POTASSIUM SERPL-SCNC: 4.1 MMOL/L
PROT SERPL-MCNC: 6.4 G/DL
RBC # BLD: 3.98 M/UL — LOW (ref 4.2–5.8)
RBC # FLD: 19.8 % — HIGH (ref 10.3–14.5)
SODIUM SERPL-SCNC: 140 MMOL/L
WBC # BLD: 0.3 K/UL — CRITICAL LOW (ref 3.8–10.5)
WBC # FLD AUTO: 0.3 K/UL — CRITICAL LOW (ref 3.8–10.5)

## 2019-11-07 PROCEDURE — 99214 OFFICE O/P EST MOD 30 MIN: CPT

## 2019-11-07 PROCEDURE — XXXXX: CPT

## 2019-11-07 RX ORDER — ALLOPURINOL 300 MG/1
300 TABLET ORAL
Qty: 14 | Refills: 0 | Status: DISCONTINUED | COMMUNITY
Start: 2019-10-09 | End: 2019-11-07

## 2019-11-13 ENCOUNTER — RESULT REVIEW (OUTPATIENT)
Age: 69
End: 2019-11-13

## 2019-11-13 ENCOUNTER — APPOINTMENT (OUTPATIENT)
Dept: HEMATOLOGY ONCOLOGY | Facility: CLINIC | Age: 69
End: 2019-11-13
Payer: COMMERCIAL

## 2019-11-13 ENCOUNTER — OUTPATIENT (OUTPATIENT)
Dept: OUTPATIENT SERVICES | Facility: HOSPITAL | Age: 69
LOS: 1 days | Discharge: HOME | End: 2019-11-13

## 2019-11-13 ENCOUNTER — EMERGENCY (EMERGENCY)
Facility: HOSPITAL | Age: 69
LOS: 1 days | Discharge: HOME | End: 2019-11-13
Attending: EMERGENCY MEDICINE
Payer: COMMERCIAL

## 2019-11-13 VITALS
SYSTOLIC BLOOD PRESSURE: 130 MMHG | HEART RATE: 89 BPM | WEIGHT: 142.64 LBS | DIASTOLIC BLOOD PRESSURE: 94 MMHG | BODY MASS INDEX: 24.26 KG/M2 | RESPIRATION RATE: 16 BRPM | OXYGEN SATURATION: 100 % | TEMPERATURE: 97.6 F

## 2019-11-13 VITALS
TEMPERATURE: 98 F | DIASTOLIC BLOOD PRESSURE: 59 MMHG | HEART RATE: 74 BPM | OXYGEN SATURATION: 99 % | RESPIRATION RATE: 18 BRPM | SYSTOLIC BLOOD PRESSURE: 108 MMHG | WEIGHT: 143.08 LBS | HEIGHT: 64 IN

## 2019-11-13 DIAGNOSIS — C85.88 OTHER SPECIFIED TYPES OF NON-HODGKIN LYMPHOMA, LYMPH NODES OF MULTIPLE SITES: ICD-10-CM

## 2019-11-13 LAB
ALBUMIN SERPL ELPH-MCNC: 5 G/DL — SIGNIFICANT CHANGE UP (ref 3.3–5)
ALP SERPL-CCNC: 148 U/L — HIGH (ref 40–120)
ALT FLD-CCNC: 25 U/L — SIGNIFICANT CHANGE UP (ref 10–45)
ANION GAP SERPL CALC-SCNC: 17 MMOL/L — SIGNIFICANT CHANGE UP (ref 5–17)
ANISOCYTOSIS BLD QL: SIGNIFICANT CHANGE UP
AST SERPL-CCNC: 19 U/L — SIGNIFICANT CHANGE UP (ref 10–40)
BASE EXCESS BLDV CALC-SCNC: 1.2 MMOL/L — SIGNIFICANT CHANGE UP (ref -2–2)
BASOPHILS # BLD AUTO: 0 K/UL — SIGNIFICANT CHANGE UP (ref 0–0.2)
BASOPHILS # BLD AUTO: 0 K/UL — SIGNIFICANT CHANGE UP (ref 0–0.2)
BASOPHILS NFR BLD AUTO: 0 % — SIGNIFICANT CHANGE UP (ref 0–2)
BASOPHILS NFR BLD AUTO: 0.2 % — SIGNIFICANT CHANGE UP (ref 0–2)
BILIRUB SERPL-MCNC: 0.2 MG/DL — SIGNIFICANT CHANGE UP (ref 0.2–1.2)
BUN SERPL-MCNC: 20 MG/DL — SIGNIFICANT CHANGE UP (ref 7–23)
BUN SERPL-MCNC: 21 MG/DL — SIGNIFICANT CHANGE UP (ref 7–23)
CA-I BLDA-SCNC: 1.3 MMOL/L — SIGNIFICANT CHANGE UP (ref 1.12–1.3)
CA-I SERPL-SCNC: 1.24 MMOL/L — SIGNIFICANT CHANGE UP (ref 1.12–1.3)
CALCIUM SERPL-MCNC: 10.3 MG/DL — SIGNIFICANT CHANGE UP (ref 8.4–10.5)
CHLORIDE BLDV-SCNC: 98 MMOL/L — SIGNIFICANT CHANGE UP (ref 96–108)
CHLORIDE SERPL-SCNC: 101 MMOL/L — SIGNIFICANT CHANGE UP (ref 96–108)
CHLORIDE SERPL-SCNC: 97 MMOL/L — SIGNIFICANT CHANGE UP (ref 96–108)
CO2 BLDV-SCNC: 27 MMOL/L — SIGNIFICANT CHANGE UP (ref 22–30)
CO2 SERPL-SCNC: 23 MMOL/L — SIGNIFICANT CHANGE UP (ref 22–31)
CO2 SERPL-SCNC: 27 MMOL/L — SIGNIFICANT CHANGE UP (ref 22–31)
CREAT SERPL-MCNC: 1.3 MG/DL — SIGNIFICANT CHANGE UP (ref 0.5–1.3)
CREAT SERPL-MCNC: 1.39 MG/DL — HIGH (ref 0.5–1.3)
DACRYOCYTES BLD QL SMEAR: SLIGHT — SIGNIFICANT CHANGE UP
ELLIPTOCYTES BLD QL SMEAR: SLIGHT — SIGNIFICANT CHANGE UP
EOSINOPHIL # BLD AUTO: 0 K/UL — SIGNIFICANT CHANGE UP (ref 0–0.5)
EOSINOPHIL # BLD AUTO: 0 K/UL — SIGNIFICANT CHANGE UP (ref 0–0.5)
EOSINOPHIL NFR BLD AUTO: 0 % — SIGNIFICANT CHANGE UP (ref 0–6)
EOSINOPHIL NFR BLD AUTO: 0.2 % — SIGNIFICANT CHANGE UP (ref 0–6)
GAS PNL BLDV: 137 MMOL/L — SIGNIFICANT CHANGE UP (ref 135–145)
GAS PNL BLDV: SIGNIFICANT CHANGE UP
GAS PNL BLDV: SIGNIFICANT CHANGE UP
GLUCOSE BLDV-MCNC: 113 MG/DL — HIGH (ref 70–99)
GLUCOSE SERPL-MCNC: 125 MG/DL — HIGH (ref 70–99)
GLUCOSE SERPL-MCNC: 165 MG/DL — HIGH (ref 70–99)
HCO3 BLDV-SCNC: 26 MMOL/L — SIGNIFICANT CHANGE UP (ref 21–29)
HCT VFR BLD CALC: 35.6 % — LOW (ref 39–50)
HCT VFR BLD CALC: 38.8 % — LOW (ref 39–50)
HCT VFR BLDA CALC: 36 % — LOW (ref 39–50)
HGB BLD CALC-MCNC: 11.5 G/DL — LOW (ref 13–17)
HGB BLD-MCNC: 11.2 G/DL — LOW (ref 13–17)
HGB BLD-MCNC: 12.6 G/DL — LOW (ref 13–17)
LACTATE BLDV-MCNC: 1.7 MMOL/L — SIGNIFICANT CHANGE UP (ref 0.7–2)
LDH SERPL-CCNC: 275 U/L
LG PLATELETS BLD QL AUTO: SLIGHT — SIGNIFICANT CHANGE UP
LIDOCAIN IGE QN: 19 U/L — SIGNIFICANT CHANGE UP (ref 7–60)
LYMPHOCYTES # BLD AUTO: 0.4 K/UL — LOW (ref 1–3.3)
LYMPHOCYTES # BLD AUTO: 0.43 K/UL — LOW (ref 1–3.3)
LYMPHOCYTES # BLD AUTO: 2.6 % — LOW (ref 13–44)
LYMPHOCYTES # BLD AUTO: 3 % — LOW (ref 13–44)
MACROCYTES BLD QL: SLIGHT — SIGNIFICANT CHANGE UP
MANUAL SMEAR VERIFICATION: SIGNIFICANT CHANGE UP
MCHC RBC-ENTMCNC: 24.7 PG — LOW (ref 27–34)
MCHC RBC-ENTMCNC: 26.2 PG — LOW (ref 27–34)
MCHC RBC-ENTMCNC: 31.5 GM/DL — LOW (ref 32–36)
MCHC RBC-ENTMCNC: 32.6 G/DL — SIGNIFICANT CHANGE UP (ref 32–36)
MCV RBC AUTO: 78.4 FL — LOW (ref 80–100)
MCV RBC AUTO: 80.6 FL — SIGNIFICANT CHANGE UP (ref 80–100)
METAMYELOCYTES # FLD: 1 % — HIGH (ref 0–0)
MICROCYTES BLD QL: SLIGHT — SIGNIFICANT CHANGE UP
MONOCYTES # BLD AUTO: 0.6 K/UL — SIGNIFICANT CHANGE UP (ref 0–0.9)
MONOCYTES # BLD AUTO: 1.43 K/UL — HIGH (ref 0–0.9)
MONOCYTES NFR BLD AUTO: 10 % — SIGNIFICANT CHANGE UP (ref 2–14)
MONOCYTES NFR BLD AUTO: 3.5 % — SIGNIFICANT CHANGE UP (ref 2–14)
MYELOCYTES NFR BLD: 1 % — HIGH (ref 0–0)
NEUTROPHILS # BLD AUTO: 12.03 K/UL — HIGH (ref 1.8–7.4)
NEUTROPHILS # BLD AUTO: 15.5 K/UL — HIGH (ref 1.8–7.4)
NEUTROPHILS NFR BLD AUTO: 84 % — HIGH (ref 43–77)
NEUTROPHILS NFR BLD AUTO: 93.4 % — HIGH (ref 43–77)
NRBC # BLD: 0 /100 — SIGNIFICANT CHANGE UP (ref 0–0)
PCO2 BLDV: 44 MMHG — SIGNIFICANT CHANGE UP (ref 35–50)
PH BLDV: 7.39 — SIGNIFICANT CHANGE UP (ref 7.35–7.45)
PLAT MORPH BLD: ABNORMAL
PLATELET # BLD AUTO: 190 K/UL — SIGNIFICANT CHANGE UP (ref 150–400)
PLATELET # BLD AUTO: 223 K/UL — SIGNIFICANT CHANGE UP (ref 150–400)
PO2 BLDV: 35 MMHG — SIGNIFICANT CHANGE UP (ref 25–45)
POLYCHROMASIA BLD QL SMEAR: SLIGHT — SIGNIFICANT CHANGE UP
POTASSIUM BLDV-SCNC: 4.4 MMOL/L — SIGNIFICANT CHANGE UP (ref 3.5–5.3)
POTASSIUM SERPL-MCNC: 4.5 MMOL/L — SIGNIFICANT CHANGE UP (ref 3.5–5.3)
POTASSIUM SERPL-MCNC: 5.1 MMOL/L — SIGNIFICANT CHANGE UP (ref 3.5–5.3)
POTASSIUM SERPL-SCNC: 4.5 MMOL/L — SIGNIFICANT CHANGE UP (ref 3.5–5.3)
POTASSIUM SERPL-SCNC: 5.1 MMOL/L — SIGNIFICANT CHANGE UP (ref 3.5–5.3)
PROT SERPL-MCNC: 7.6 G/DL — SIGNIFICANT CHANGE UP (ref 6–8.3)
RBC # BLD: 4.54 M/UL — SIGNIFICANT CHANGE UP (ref 4.2–5.8)
RBC # BLD: 4.82 M/UL — SIGNIFICANT CHANGE UP (ref 4.2–5.8)
RBC # FLD: 21.5 % — HIGH (ref 10.3–14.5)
RBC # FLD: 23.4 % — HIGH (ref 10.3–14.5)
RBC BLD AUTO: ABNORMAL
SAO2 % BLDV: 58 % — LOW (ref 67–88)
SODIUM SERPL-SCNC: 137 MMOL/L — SIGNIFICANT CHANGE UP (ref 135–145)
SODIUM SERPL-SCNC: 139 MMOL/L — SIGNIFICANT CHANGE UP (ref 135–145)
VARIANT LYMPHS # BLD: 1 % — SIGNIFICANT CHANGE UP (ref 0–6)
WBC # BLD: 14.32 K/UL — HIGH (ref 3.8–10.5)
WBC # BLD: 16.6 K/UL — HIGH (ref 3.8–10.5)
WBC # FLD AUTO: 14.32 K/UL — HIGH (ref 3.8–10.5)
WBC # FLD AUTO: 16.6 K/UL — HIGH (ref 3.8–10.5)

## 2019-11-13 PROCEDURE — 84295 ASSAY OF SERUM SODIUM: CPT

## 2019-11-13 PROCEDURE — 74177 CT ABD & PELVIS W/CONTRAST: CPT | Mod: 26

## 2019-11-13 PROCEDURE — 74177 CT ABD & PELVIS W/CONTRAST: CPT

## 2019-11-13 PROCEDURE — 71045 X-RAY EXAM CHEST 1 VIEW: CPT

## 2019-11-13 PROCEDURE — 99284 EMERGENCY DEPT VISIT MOD MDM: CPT | Mod: 25

## 2019-11-13 PROCEDURE — 85027 COMPLETE CBC AUTOMATED: CPT

## 2019-11-13 PROCEDURE — 71045 X-RAY EXAM CHEST 1 VIEW: CPT | Mod: 26

## 2019-11-13 PROCEDURE — 80048 BASIC METABOLIC PNL TOTAL CA: CPT

## 2019-11-13 PROCEDURE — 99284 EMERGENCY DEPT VISIT MOD MDM: CPT

## 2019-11-13 PROCEDURE — 99214 OFFICE O/P EST MOD 30 MIN: CPT

## 2019-11-13 PROCEDURE — 82435 ASSAY OF BLOOD CHLORIDE: CPT

## 2019-11-13 PROCEDURE — 82803 BLOOD GASES ANY COMBINATION: CPT

## 2019-11-13 PROCEDURE — 83690 ASSAY OF LIPASE: CPT

## 2019-11-13 PROCEDURE — 82330 ASSAY OF CALCIUM: CPT

## 2019-11-13 PROCEDURE — 85014 HEMATOCRIT: CPT

## 2019-11-13 PROCEDURE — 83605 ASSAY OF LACTIC ACID: CPT

## 2019-11-13 PROCEDURE — 84132 ASSAY OF SERUM POTASSIUM: CPT

## 2019-11-13 PROCEDURE — 96374 THER/PROPH/DIAG INJ IV PUSH: CPT | Mod: XU

## 2019-11-13 PROCEDURE — 82947 ASSAY GLUCOSE BLOOD QUANT: CPT

## 2019-11-13 PROCEDURE — 80053 COMPREHEN METABOLIC PANEL: CPT

## 2019-11-13 RX ORDER — SODIUM CHLORIDE 9 MG/ML
1000 INJECTION, SOLUTION INTRAVENOUS ONCE
Refills: 0 | Status: COMPLETED | OUTPATIENT
Start: 2019-11-13 | End: 2019-11-13

## 2019-11-13 RX ORDER — ONDANSETRON 8 MG/1
4 TABLET, FILM COATED ORAL ONCE
Refills: 0 | Status: COMPLETED | OUTPATIENT
Start: 2019-11-13 | End: 2019-11-13

## 2019-11-13 RX ORDER — SODIUM CHLORIDE 9 MG/ML
1000 INJECTION, SOLUTION INTRAVENOUS
Refills: 0 | Status: DISCONTINUED | OUTPATIENT
Start: 2019-11-13 | End: 2019-11-17

## 2019-11-13 RX ORDER — LEVOFLOXACIN 750 MG/1
750 TABLET, FILM COATED ORAL DAILY
Qty: 7 | Refills: 0 | Status: COMPLETED | COMMUNITY
Start: 2019-10-17 | End: 2019-11-13

## 2019-11-13 RX ADMIN — SODIUM CHLORIDE 1000 MILLILITER(S): 9 INJECTION, SOLUTION INTRAVENOUS at 18:15

## 2019-11-13 RX ADMIN — ONDANSETRON 4 MILLIGRAM(S): 8 TABLET, FILM COATED ORAL at 18:15

## 2019-11-13 RX ADMIN — SODIUM CHLORIDE 2000 MILLILITER(S): 9 INJECTION, SOLUTION INTRAVENOUS at 23:26

## 2019-11-13 RX ADMIN — SODIUM CHLORIDE 150 MILLILITER(S): 9 INJECTION, SOLUTION INTRAVENOUS at 21:14

## 2019-11-13 NOTE — ED PROVIDER NOTE - PMH
CAD (coronary artery disease)  s/p stent  HTN (hypertension)    Hypercholesteremia    Hypothyroidism    Lymphoma    Prostate cancer    Sleep apnea

## 2019-11-13 NOTE — CONSULT NOTE ADULT - SUBJECTIVE AND OBJECTIVE BOX
HPI:  69 yr old male presents to ED c/o abdominal pain, nausea and vomiting earlier today. Pt was seen at outside facility and told to come to ED to rule out bowel perforation. His PMH is significant for B cell lymphoma of small bowel which was initially assessed by Dr. BROOKE Cunha on 9/4. Pt had biopsy showing B cell lymphoma and was started on chemotherapy, completed 2nd cycle 10/31, and states he has been constipated for 1.5 weeks since then. He took miralax on Monday and Tuesday after which on Tuesday evening he had 2 bowel movements. Wednesday morning he had another 3 BMs and felt relief, however later in the morning he became nauseous and began having diffuse abdominal pain. He vomited 3 times, described as small volume of clear yellow fluid. States last episode was at 3pm and he has felt better since then. Currently endorses flatus and denies any further abdominal pain or nausea. Pt denies any fevers/chills, SOB/CP, lightheadedness/dizziness, urinary hesitancy, incomplete emptying or frequency. CT done in ED significant for known mass in Jejunum causing a partial SBO. Labs also significant for RAI with Cr 1.39.     PMHx: Hypothyroidism  CAD (coronary artery disease)  Lymphoma  Sleep apnea  Prostate cancer  Hypercholesteremia  HTN (hypertension)    PSHx: Uvular hypertrophy    Medications (inpatient): lactated ringers Bolus 1000 milliLiter(s) IV Bolus once  lactated ringers. 1000 milliLiter(s) IV Continuous <Continuous>    Medications (PRN):  Allergies: No Known Allergies  (Intolerances: )  Social Hx:   Family Hx: Family history of colorectal cancer  Family history of lymphoma      T(C): 36.8  HR: 69 (69 - 81)  BP: 140/80 (108/59 - 140/80)  RR: 18 (17 - 18)  SpO2: 99% (99% - 100%)  Tmax: T(C): , Max: 36.8 (11-13-19 @ 21:49)      Physical Exam:    General: Well-nourished, well-developed, in no acute distress   Neurologic: GCS 15, AAOx3  Respiratory: Normal respiratory effort.   CVS: RRR  Abdomen: Abdomen soft, NT/ND, no rebound or guarding   Ext: Normal ROM all 4 extremities  Skin: Warm, dry, intact, no erythema     Labs:                        11.2   14.32 )-----------( 223      ( 13 Nov 2019 18:26 )             35.6       11-13    137  |  97  |  20  ----------------------------<  125<H>  4.5   |  23  |  1.39<H>    Ca    10.3      13 Nov 2019 18:26    TPro  7.6  /  Alb  5.0  /  TBili  0.2  /  DBili  x   /  AST  19  /  ALT  25  /  AlkPhos  148<H>  11-13            Imaging and other studies:      EXAM:  CT ABDOMEN AND PELVIS IC                            PROCEDURE DATE:  11/13/2019            INTERPRETATION:  CLINICAL INFORMATION: Known B cell lymphoma an   chemotherapy. Vomiting. Evaluate for SBO.    COMPARISON: PET/CT 9/16/2019.    PROCEDURE:   CT of the Abdomen and Pelvis was performed with intravenous contrast.   Intravenous contrast: 90 ml Omnipaque 350. 10 ml discarded.  Oral contrast: None.  Sagittal and coronal reformats were performed.    FINDINGS:    LOWER CHEST: Partially imaged Mediport tip.    LIVER: Left hepatic lobe hypodensity too small to characterize.  BILE DUCTS: Normal caliber.  GALLBLADDER: Within normal limits.  SPLEEN: Within normal limits.  PANCREAS: Within normal limits.  ADRENALS: Within normal limits.  KIDNEYS/URETERS: Within normal limits.    BLADDER: Within normal limits.  REPRODUCTIVE ORGANS: Fiducial markers in the prostate.    BOWEL: Interval decrease in the circumferential mass involving the   jejunum since 9/16/2019, now with focal luminal narrowing and increased   distention of more proximal bowel loops. Unchanged decompressed bowel   loops distal to the mass with feces in distal small bowel loops.   Unchanged distended stomach. Large stool burden.  PERITONEUM: No ascites.  VESSELS: Atherosclerotic calcification of the aorta and its branches.  RETROPERITONEUM/LYMPH NODES: No lymphadenopathy.    ABDOMINAL WALL: Fat-containing right inguinal hernia.  BONES: Within normal limits.    IMPRESSION:     Partial small bowel obstruction with a transition point at the level of   the jejunal mass consistent with known history of B-cell lymphoma. There   is new narrowing of the lumen and increased proximal dilatation. Findings   are suspicious for underlying stricture.                JAIME FORMAN M.D., RADIOLOGY RESIDENT  This document has been electronically signed.  GUY ANTHONY M.D., ATTENDING RADIOLOGIST  This document has been electronically signed. Nov 13 2019  7:41PM HPI:  69 yr old male presents to ED c/o abdominal pain, nausea and vomiting earlier today. Pt was seen at outside facility and told to come to ED to rule out bowel perforation. Has a hx of SBO in 1/2018 resolved with NGT decompression. Recent history significant for B cell lymphoma of small bowel which was initially assessed by Dr. BROOKE Cunah on 9/4. Pt had biopsy showing B cell lymphoma and was started on chemotherapy, completed 2nd cycle 10/31, and states he has been constipated for 1.5 weeks since then. He took miralax on Monday and Tuesday after which on Tuesday evening he had 2 bowel movements. Wednesday morning he had another 3 BMs and felt relief, however later in the morning he became nauseous and began having diffuse abdominal pain. He vomited 3 times, described as small volume of clear yellow fluid. States last episode was at 3pm and he has felt better since then. Currently endorses flatus and denies any further abdominal pain or nausea. Pt denies any fevers/chills, SOB/CP, lightheadedness/dizziness, urinary hesitancy, incomplete emptying or frequency. CT done in ED significant for known mass in Jejunum causing a partial SBO. Labs also significant for RAI with Cr 1.39.     PMHx: Hypothyroidism  CAD (coronary artery disease)  Lymphoma  Sleep apnea  Prostate cancer  Hypercholesteremia  HTN (hypertension)    PSHx: Uvular hypertrophy    Medications (inpatient): lactated ringers Bolus 1000 milliLiter(s) IV Bolus once  lactated ringers. 1000 milliLiter(s) IV Continuous <Continuous>    Medications (PRN):  Allergies: No Known Allergies  (Intolerances: )  Social Hx:   Family Hx: Family history of colorectal cancer  Family history of lymphoma      T(C): 36.8  HR: 69 (69 - 81)  BP: 140/80 (108/59 - 140/80)  RR: 18 (17 - 18)  SpO2: 99% (99% - 100%)  Tmax: T(C): , Max: 36.8 (11-13-19 @ 21:49)      Physical Exam:    General: Well-nourished, well-developed, in no acute distress   Neurologic: GCS 15, AAOx3  Respiratory: Normal respiratory effort.   CVS: RRR  Abdomen: Abdomen soft, NT/ND, no rebound or guarding   Ext: Normal ROM all 4 extremities  Skin: Warm, dry, intact, no erythema     Labs:                        11.2   14.32 )-----------( 223      ( 13 Nov 2019 18:26 )             35.6       11-13    137  |  97  |  20  ----------------------------<  125<H>  4.5   |  23  |  1.39<H>    Ca    10.3      13 Nov 2019 18:26    TPro  7.6  /  Alb  5.0  /  TBili  0.2  /  DBili  x   /  AST  19  /  ALT  25  /  AlkPhos  148<H>  11-13            Imaging and other studies:      EXAM:  CT ABDOMEN AND PELVIS IC                            PROCEDURE DATE:  11/13/2019            INTERPRETATION:  CLINICAL INFORMATION: Known B cell lymphoma an   chemotherapy. Vomiting. Evaluate for SBO.    COMPARISON: PET/CT 9/16/2019.    PROCEDURE:   CT of the Abdomen and Pelvis was performed with intravenous contrast.   Intravenous contrast: 90 ml Omnipaque 350. 10 ml discarded.  Oral contrast: None.  Sagittal and coronal reformats were performed.    FINDINGS:    LOWER CHEST: Partially imaged Mediport tip.    LIVER: Left hepatic lobe hypodensity too small to characterize.  BILE DUCTS: Normal caliber.  GALLBLADDER: Within normal limits.  SPLEEN: Within normal limits.  PANCREAS: Within normal limits.  ADRENALS: Within normal limits.  KIDNEYS/URETERS: Within normal limits.    BLADDER: Within normal limits.  REPRODUCTIVE ORGANS: Fiducial markers in the prostate.    BOWEL: Interval decrease in the circumferential mass involving the   jejunum since 9/16/2019, now with focal luminal narrowing and increased   distention of more proximal bowel loops. Unchanged decompressed bowel   loops distal to the mass with feces in distal small bowel loops.   Unchanged distended stomach. Large stool burden.  PERITONEUM: No ascites.  VESSELS: Atherosclerotic calcification of the aorta and its branches.  RETROPERITONEUM/LYMPH NODES: No lymphadenopathy.    ABDOMINAL WALL: Fat-containing right inguinal hernia.  BONES: Within normal limits.    IMPRESSION:     Partial small bowel obstruction with a transition point at the level of   the jejunal mass consistent with known history of B-cell lymphoma. There   is new narrowing of the lumen and increased proximal dilatation. Findings   are suspicious for underlying stricture.                JAIME FORMAN M.D., RADIOLOGY RESIDENT  This document has been electronically signed.  GUY ANTHONY M.D., ATTENDING RADIOLOGIST  This document has been electronically signed. Nov 13 2019  7:41PM

## 2019-11-13 NOTE — ED PROVIDER NOTE - PHYSICAL EXAMINATION
Gen: AAO x 3, NAD; chronically ill appearing   Skin: No rashes or lesions  HEENT: NC/AT, PERRLA, EOMI, dry MM  Resp: unlabored CTAB  Cardiac: rrr s1s2, no murmurs, rubs or gallops  GI: ND, +BS, Soft, mild epigastric ttp   Ext: no pedal edema, FROM in all extremities  Neuro: no focal deficits

## 2019-11-13 NOTE — ED PROVIDER NOTE - NS ED ROS FT
Constitutional: No fever or chills  Eyes: No visual changes, eye pain or redness  HEENT: No throat pain, ear pain, nasal pain. No nose bleeding.  CV: No chest pain or lower extremity edema  Resp: No SOB no cough  GI: +abd pain. +nausea +vomiting. No diarrhea. +constipation.   : No dysuria, hematuria.   MSK: No musculoskeletal pain  Skin: No rash  Neuro: No headache. No numbness or tingling. No weakness. Constitutional: No fever or chills  Eyes: No visual changes, eye pain or redness  HEENT: No throat pain, ear pain, nasal pain. No nose bleeding.  CV: No chest pain or lower extremity edema  Resp: No SOB no cough  GI: +abd pain. +nausea +vomiting. No diarrhea. +constipation.   : No dysuria, hematuria.   MSK: No musculoskeletal pain  Skin: No rash  Neuro: No headache. No numbness or tingling. No weakness.    All other systems negative

## 2019-11-13 NOTE — ED ADULT NURSE NOTE - NSIMPLEMENTINTERV_GEN_ALL_ED
Implemented All Universal Safety Interventions:  [place] to call system. Call bell, personal items and telephone within reach. Instruct patient to call for assistance. Room bathroom lighting operational. Non-slip footwear when patient is off stretcher. Physically safe environment: no spills, clutter or unnecessary equipment. Stretcher in lowest position, wheels locked, appropriate side rails in place.

## 2019-11-13 NOTE — ED PROVIDER NOTE - NSFOLLOWUPINSTRUCTIONS_ED_ALL_ED_FT
1. Follow up with Dr. Kingston in 24-48 hours; Also follow up with Dr. Dumont this week.   2. Rest. Keep self hydrated, start with small amout of water every 20-30 minutes then advance to clears (ginger ale, Broth), then bland diet. Avoid diary, avoid spicy/fatty foods.  3. Return to the ER for any abdominal pain, nausea, vomiting, NOT passing gas, fever, chills, unable to eat or drink or any other concerns.

## 2019-11-13 NOTE — CONSULT NOTE ADULT - ASSESSMENT
ASSESSMENT:  69 yr old male with hx of B cell lymphoma in jejunum, causing partial SBO, which appears to have resolved, also with RAI.    PLAN:  - No acute surgical intervention   - Recommend PO challenge, monitor for increased abd pain, nausea or vomiting  - Recommend observation and hydration for RAI, repeat labs and medical admission if not resolving  - Plan discussed with surgical attending    Erik Monzon, PGY 2  [id number] ASSESSMENT:  69 yr old male with hx of B cell lymphoma in jejunum, causing partial SBO, which appears to have resolved, also with RAI.    PLAN:  - No acute surgical intervention   - Recommend PO challenge, monitor for increased abd pain, nausea or vomiting  - Recommend observation and hydration for RAI, repeat labs and medical admission if not resolving  - Colace and Senna for management of constipation  - Plan discussed with surgical attending    Erik Monzon, PGY 2  [phone]

## 2019-11-13 NOTE — ED ADULT NURSE NOTE - CHPI ED NUR SYMPTOMS NEG
no burning urination/no fever/no blood in stool/no chills/no abdominal distension/no dysuria/no hematuria/no diarrhea

## 2019-11-13 NOTE — ED PROVIDER NOTE - OBJECTIVE STATEMENT
69yom pmhx HTN, HLD, CAD s/p stent, hypothyroidism, h/o prostate CA (treated), DANIEL (not on CPAP), B-cell lymphoma (as of 9/2019) on RCHOP, recent admission for neutropenic sepsis, positive for enterorhinovirus here today with 3 episodes of NB NB vomiting unable to tolerate PO and decrease appetite. 69yom pmhx HTN, HLD, CAD s/p stent, hypothyroidism, h/o prostate CA (treated), DANIEL (not on CPAP), B-cell lymphoma (as of 9/2019) on RCHOP, recent admission for neutropenic sepsis, positive for enterorhinovirus here today with 3 episodes of NB NB vomiting unable to tolerate PO and decrease appetite. pt reports constipation since 10/31 after last chemo so yesterday took miralax and 2 doses of senakot. this am with 3 episodes of large BM then had toast for breakfast with vomiting and unable to eat since. no fever or chills. no chest pain or sob. diffuse abd discomfort and bloating. +flatus since the Bm

## 2019-11-13 NOTE — ED ADULT NURSE REASSESSMENT NOTE - NS ED NURSE REASSESS COMMENT FT1
Patient lying in bed comfortably with family at bedside. Patient states he feels much better. Patient denies abd pain, nausea/vomiting.

## 2019-11-13 NOTE — ED PROVIDER NOTE - PROGRESS NOTE DETAILS
pt resting. No complaints. no nausea or vomiting. seen and evaluated by the Surgery team. Recommend PO challenge as probably resolving partial sbo. pt and family at bedside. Would like to go home with close follow up and strict return precautions. Offered crackers and gingerale. will observe for now. Surgery at bedside. Now requesting repeat BMP to ensure resolution/improvement of RAI. If improved and continues to tolerate PO will dc. If still with RAI will admit for hydration Bailee Bautista MD - Attending Physician: BMP at baseline. Tolerated PO without issue. No abd pain. Wants to go home. F/u tomorrow as planned with Onc, next week with Surg. Has zofran at home. Return precautions discussed

## 2019-11-13 NOTE — ED PROVIDER NOTE - PATIENT PORTAL LINK FT
You can access the FollowMyHealth Patient Portal offered by [facility] by registering at the following website: [email or web address]. By joining [facility]’s FollowMyHealth portal, you will also be able to view your health information using other applications (apps) compatible with our system.

## 2019-11-13 NOTE — ED PROVIDER NOTE - CLINICAL SUMMARY MEDICAL DECISION MAKING FREE TEXT BOX
Bailee Bautista MD - Attending Physician: Pt here with abd pain, vomiting. Recent chemo, no fevers. Now symptoms nearly resolved on arrival. Minimal exam findings, abd benign. Labs, CT for r/o obstruction vs typhlitis vs other

## 2019-11-13 NOTE — ED PROVIDER NOTE - ATTENDING CONTRIBUTION TO CARE
Bailee Bautista MD - Attending Physician: I have personally seen and examined this patient. I have discussed the case with the ACP. I have reviewed all pertinent clinical information, including history, physical exam, plan and the ACP’s note and agree except as noted. See MDM

## 2019-11-13 NOTE — ED ADULT NURSE NOTE - OBJECTIVE STATEMENT
69y old male walk in from triage c/o vomiting. Patient states that this morning he started having lower abdominal pain, nausea and multiple episodes of vomiting. Patient was sent in by [facility] where he is being treated for non-hodgkin's lymphoma. Patients last chemo tx was 10/31/19. A&Ox3. PMH Prostate CA, HTN, CAD, Hypothyroid. Patient denies chest pain, SOB, 69y old male walk in from triage c/o vomiting. Patient states that this morning he started having lower abdominal pain, nausea and multiple episodes of vomiting. Patient was sent in by [facility] where he is being treated for non-hodgkin's lymphoma. Patients last chemo tx was 10/31/19. A&Ox3. PMH Prostate CA, HTN, CAD, Hypothyroid. Patient denies chest pain, SOB, fever/chills, lightheaded, dizzy, blood in stool, numbness/tingling and burning/pain w/ urination.

## 2019-11-13 NOTE — ED ADULT NURSE REASSESSMENT NOTE - NS ED NURSE REASSESS COMMENT FT1
Report received from Teresa GARNICA. Aox3, speaking in complete sentences. Unlabored, spontaneous respirations, NAD. Pt reports having crackers and jello, denies and nausea and vomiting at this time. Report received from Teresa GARNICA. Aox3, speaking in complete sentences. Unlabored, spontaneous respirations, NAD. Pt reports having crackers and jello, denies and nausea and vomiting at this time. Intravenous fluids infusing by R sided port, accessed by prior RN.

## 2019-11-14 VITALS
DIASTOLIC BLOOD PRESSURE: 75 MMHG | TEMPERATURE: 98 F | SYSTOLIC BLOOD PRESSURE: 143 MMHG | OXYGEN SATURATION: 97 % | RESPIRATION RATE: 18 BRPM | HEART RATE: 67 BPM

## 2019-11-14 LAB
ANION GAP SERPL CALC-SCNC: 10 MMOL/L — SIGNIFICANT CHANGE UP (ref 5–17)
BUN SERPL-MCNC: 17 MG/DL — SIGNIFICANT CHANGE UP (ref 7–23)
CALCIUM SERPL-MCNC: 9.2 MG/DL — SIGNIFICANT CHANGE UP (ref 8.4–10.5)
CHLORIDE SERPL-SCNC: 100 MMOL/L — SIGNIFICANT CHANGE UP (ref 96–108)
CO2 SERPL-SCNC: 25 MMOL/L — SIGNIFICANT CHANGE UP (ref 22–31)
CREAT SERPL-MCNC: 0.98 MG/DL — SIGNIFICANT CHANGE UP (ref 0.5–1.3)
GLUCOSE SERPL-MCNC: 131 MG/DL — HIGH (ref 70–99)
POTASSIUM SERPL-MCNC: 3.9 MMOL/L — SIGNIFICANT CHANGE UP (ref 3.5–5.3)
POTASSIUM SERPL-SCNC: 3.9 MMOL/L — SIGNIFICANT CHANGE UP (ref 3.5–5.3)
SODIUM SERPL-SCNC: 135 MMOL/L — SIGNIFICANT CHANGE UP (ref 135–145)

## 2019-11-20 ENCOUNTER — INPATIENT (INPATIENT)
Facility: HOSPITAL | Age: 69
LOS: 9 days | Discharge: HOME | DRG: 820 | End: 2019-11-30
Attending: SURGERY | Admitting: SURGERY
Payer: COMMERCIAL

## 2019-11-20 ENCOUNTER — APPOINTMENT (OUTPATIENT)
Dept: COLORECTAL SURGERY | Facility: CLINIC | Age: 69
End: 2019-11-20

## 2019-11-20 VITALS
OXYGEN SATURATION: 97 % | SYSTOLIC BLOOD PRESSURE: 123 MMHG | DIASTOLIC BLOOD PRESSURE: 85 MMHG | HEART RATE: 85 BPM | TEMPERATURE: 98 F | RESPIRATION RATE: 19 BRPM

## 2019-11-20 DIAGNOSIS — K56.609 UNSPECIFIED INTESTINAL OBSTRUCTION, UNSPECIFIED AS TO PARTIAL VERSUS COMPLETE OBSTRUCTION: ICD-10-CM

## 2019-11-20 LAB
ALBUMIN SERPL ELPH-MCNC: 5.2 G/DL — HIGH (ref 3.3–5)
ALP SERPL-CCNC: 122 U/L — HIGH (ref 40–120)
ALT FLD-CCNC: 49 U/L — HIGH (ref 10–45)
ANION GAP SERPL CALC-SCNC: 17 MMOL/L — SIGNIFICANT CHANGE UP (ref 5–17)
AST SERPL-CCNC: 28 U/L — SIGNIFICANT CHANGE UP (ref 10–40)
BASOPHILS # BLD AUTO: 0 K/UL — SIGNIFICANT CHANGE UP (ref 0–0.2)
BASOPHILS NFR BLD AUTO: 0 % — SIGNIFICANT CHANGE UP (ref 0–2)
BILIRUB SERPL-MCNC: 0.4 MG/DL — SIGNIFICANT CHANGE UP (ref 0.2–1.2)
BUN SERPL-MCNC: 19 MG/DL — SIGNIFICANT CHANGE UP (ref 7–23)
CALCIUM SERPL-MCNC: 10.2 MG/DL — SIGNIFICANT CHANGE UP (ref 8.4–10.5)
CHLORIDE SERPL-SCNC: 96 MMOL/L — SIGNIFICANT CHANGE UP (ref 96–108)
CO2 SERPL-SCNC: 27 MMOL/L — SIGNIFICANT CHANGE UP (ref 22–31)
CREAT SERPL-MCNC: 1.13 MG/DL — SIGNIFICANT CHANGE UP (ref 0.5–1.3)
EOSINOPHIL # BLD AUTO: 0 K/UL — SIGNIFICANT CHANGE UP (ref 0–0.5)
EOSINOPHIL NFR BLD AUTO: 0 % — SIGNIFICANT CHANGE UP (ref 0–6)
GLUCOSE SERPL-MCNC: 131 MG/DL — HIGH (ref 70–99)
HCT VFR BLD CALC: 37.3 % — LOW (ref 39–50)
HGB BLD-MCNC: 11.6 G/DL — LOW (ref 13–17)
LACTATE BLDV-MCNC: 1.3 MMOL/L — SIGNIFICANT CHANGE UP (ref 0.7–2)
LIDOCAIN IGE QN: 23 U/L — SIGNIFICANT CHANGE UP (ref 7–60)
LYMPHOCYTES # BLD AUTO: 0.17 K/UL — LOW (ref 1–3.3)
LYMPHOCYTES # BLD AUTO: 1.8 % — LOW (ref 13–44)
MCHC RBC-ENTMCNC: 24.8 PG — LOW (ref 27–34)
MCHC RBC-ENTMCNC: 31.1 GM/DL — LOW (ref 32–36)
MCV RBC AUTO: 79.7 FL — LOW (ref 80–100)
MONOCYTES # BLD AUTO: 0.16 K/UL — SIGNIFICANT CHANGE UP (ref 0–0.9)
MONOCYTES NFR BLD AUTO: 1.7 % — LOW (ref 2–14)
NEUTROPHILS # BLD AUTO: 9.03 K/UL — HIGH (ref 1.8–7.4)
NEUTROPHILS NFR BLD AUTO: 96.5 % — HIGH (ref 43–77)
PLATELET # BLD AUTO: 378 K/UL — SIGNIFICANT CHANGE UP (ref 150–400)
POTASSIUM SERPL-MCNC: 4.1 MMOL/L — SIGNIFICANT CHANGE UP (ref 3.5–5.3)
POTASSIUM SERPL-SCNC: 4.1 MMOL/L — SIGNIFICANT CHANGE UP (ref 3.5–5.3)
PROT SERPL-MCNC: 7.9 G/DL — SIGNIFICANT CHANGE UP (ref 6–8.3)
RBC # BLD: 4.68 M/UL — SIGNIFICANT CHANGE UP (ref 4.2–5.8)
RBC # FLD: 24.9 % — HIGH (ref 10.3–14.5)
SODIUM SERPL-SCNC: 140 MMOL/L — SIGNIFICANT CHANGE UP (ref 135–145)
WBC # BLD: 9.36 K/UL — SIGNIFICANT CHANGE UP (ref 3.8–10.5)
WBC # FLD AUTO: 9.36 K/UL — SIGNIFICANT CHANGE UP (ref 3.8–10.5)

## 2019-11-20 PROCEDURE — 99223 1ST HOSP IP/OBS HIGH 75: CPT

## 2019-11-20 PROCEDURE — 71045 X-RAY EXAM CHEST 1 VIEW: CPT | Mod: 26,77

## 2019-11-20 PROCEDURE — 74177 CT ABD & PELVIS W/CONTRAST: CPT | Mod: 26

## 2019-11-20 PROCEDURE — 99285 EMERGENCY DEPT VISIT HI MDM: CPT

## 2019-11-20 PROCEDURE — 71045 X-RAY EXAM CHEST 1 VIEW: CPT | Mod: 26

## 2019-11-20 RX ORDER — SODIUM CHLORIDE 9 MG/ML
1000 INJECTION INTRAMUSCULAR; INTRAVENOUS; SUBCUTANEOUS
Refills: 0 | Status: DISCONTINUED | OUTPATIENT
Start: 2019-11-20 | End: 2019-11-20

## 2019-11-20 RX ORDER — BENZOCAINE AND MENTHOL 5; 1 G/100ML; G/100ML
1 LIQUID ORAL THREE TIMES A DAY
Refills: 0 | Status: DISCONTINUED | OUTPATIENT
Start: 2019-11-20 | End: 2019-11-26

## 2019-11-20 RX ORDER — TETRACAINE/BENZOCAINE/BUTAMBEN 2%-14%-2%
1 OINTMENT (GRAM) TOPICAL EVERY 4 HOURS
Refills: 0 | Status: DISCONTINUED | OUTPATIENT
Start: 2019-11-20 | End: 2019-11-26

## 2019-11-20 RX ORDER — LEVOTHYROXINE SODIUM 125 MCG
50 TABLET ORAL AT BEDTIME
Refills: 0 | Status: DISCONTINUED | OUTPATIENT
Start: 2019-11-20 | End: 2019-11-26

## 2019-11-20 RX ORDER — SODIUM CHLORIDE 9 MG/ML
1000 INJECTION, SOLUTION INTRAVENOUS
Refills: 0 | Status: DISCONTINUED | OUTPATIENT
Start: 2019-11-20 | End: 2019-11-21

## 2019-11-20 RX ORDER — PANTOPRAZOLE SODIUM 20 MG/1
40 TABLET, DELAYED RELEASE ORAL DAILY
Refills: 0 | Status: DISCONTINUED | OUTPATIENT
Start: 2019-11-20 | End: 2019-11-26

## 2019-11-20 RX ORDER — ONDANSETRON 8 MG/1
4 TABLET, FILM COATED ORAL ONCE
Refills: 0 | Status: COMPLETED | OUTPATIENT
Start: 2019-11-20 | End: 2019-11-20

## 2019-11-20 RX ORDER — ENOXAPARIN SODIUM 100 MG/ML
40 INJECTION SUBCUTANEOUS DAILY
Refills: 0 | Status: DISCONTINUED | OUTPATIENT
Start: 2019-11-20 | End: 2019-11-26

## 2019-11-20 RX ORDER — SODIUM CHLORIDE 9 MG/ML
1000 INJECTION INTRAMUSCULAR; INTRAVENOUS; SUBCUTANEOUS ONCE
Refills: 0 | Status: COMPLETED | OUTPATIENT
Start: 2019-11-20 | End: 2019-11-20

## 2019-11-20 RX ORDER — METOPROLOL TARTRATE 50 MG
5 TABLET ORAL EVERY 6 HOURS
Refills: 0 | Status: DISCONTINUED | OUTPATIENT
Start: 2019-11-20 | End: 2019-11-26

## 2019-11-20 RX ADMIN — SODIUM CHLORIDE 1000 MILLILITER(S): 9 INJECTION INTRAMUSCULAR; INTRAVENOUS; SUBCUTANEOUS at 04:56

## 2019-11-20 RX ADMIN — SODIUM CHLORIDE 100 MILLILITER(S): 9 INJECTION, SOLUTION INTRAVENOUS at 21:40

## 2019-11-20 RX ADMIN — BENZOCAINE AND MENTHOL 1 LOZENGE: 5; 1 LIQUID ORAL at 19:42

## 2019-11-20 RX ADMIN — Medication 50 MICROGRAM(S): at 21:48

## 2019-11-20 RX ADMIN — SODIUM CHLORIDE 100 MILLILITER(S): 9 INJECTION, SOLUTION INTRAVENOUS at 11:22

## 2019-11-20 RX ADMIN — Medication 1 SPRAY(S): at 13:27

## 2019-11-20 RX ADMIN — ONDANSETRON 4 MILLIGRAM(S): 8 TABLET, FILM COATED ORAL at 08:27

## 2019-11-20 RX ADMIN — Medication 5 MILLIGRAM(S): at 11:21

## 2019-11-20 RX ADMIN — BENZOCAINE AND MENTHOL 1 LOZENGE: 5; 1 LIQUID ORAL at 16:57

## 2019-11-20 RX ADMIN — PANTOPRAZOLE SODIUM 40 MILLIGRAM(S): 20 TABLET, DELAYED RELEASE ORAL at 11:22

## 2019-11-20 RX ADMIN — ENOXAPARIN SODIUM 40 MILLIGRAM(S): 100 INJECTION SUBCUTANEOUS at 11:22

## 2019-11-20 RX ADMIN — SODIUM CHLORIDE 30 MILLILITER(S): 9 INJECTION INTRAMUSCULAR; INTRAVENOUS; SUBCUTANEOUS at 06:02

## 2019-11-20 NOTE — H&P ADULT - NSICDXPASTMEDICALHX_GEN_ALL_CORE_FT
PAST MEDICAL HISTORY:  CAD (coronary artery disease) s/p stent in LAD (2013) - no longer on anticoagulation    HTN (hypertension)     Hypercholesteremia     Hypothyroidism     Lymphoma     Prostate cancer s/p cyber knife 2008    Sleep apnea

## 2019-11-20 NOTE — H&P ADULT - NSICDXFAMILYHX_GEN_ALL_CORE_FT
FAMILY HISTORY:  Family history of colorectal cancer, mom - questionable, unknown official diagnosis 1966  Family history of lymphoma, father

## 2019-11-20 NOTE — ED PROVIDER NOTE - OBJECTIVE STATEMENT
69M with pertinent history of HTN, HLD, CAD, and lymphoma p/w bilateral lower abdominal pain and vomiting x 1 day. Last BM yesterday morning, no flatus since then. Pt was discharged from the ER last week for partial SBO treated conservatively. Was feeling better until last night. Denies any other symptoms including chest pain, SOB.

## 2019-11-20 NOTE — ED ADULT NURSE REASSESSMENT NOTE - NS ED NURSE REASSESS COMMENT FT1
patient resting comfortably on stretcher. patient given cepacol for throat as per MD order. patient denies any pain or discomfort at this time. will continue to monitor. pending admission to floor.

## 2019-11-20 NOTE — ED PROVIDER NOTE - PHYSICAL EXAMINATION
Gen: AAOx3, non-toxic  Head: NCAT  HEENT: EOMI, oral mucosa moist, normal conjunctiva  Lung: CTAB, no respiratory distress, no wheezes/rhonchi/rales B/L, speaking in full sentences  CV: RRR, no murmurs, rubs or gallops  Abd: soft, NTND, no guarding  MSK: no visible deformities  Neuro: No focal sensory or motor deficits  Skin: Warm, well perfused, no rash  Psych: normal affect.     ~Tommy Huynh PGY2

## 2019-11-20 NOTE — H&P ADULT - NSHPPHYSICALEXAM_GEN_ALL_CORE
Vital Signs Last 24 Hrs  T(C): 36.8 (20 Nov 2019 07:10), Max: 36.8 (20 Nov 2019 07:10)  T(F): 98.2 (20 Nov 2019 07:10), Max: 98.2 (20 Nov 2019 07:10)  HR: 68 (20 Nov 2019 07:10) (67 - 85)  BP: 128/72 (20 Nov 2019 07:10) (123/85 - 142/74)  BP(mean): --  RR: 18 (20 Nov 2019 07:10) (17 - 19)  SpO2: 97% (20 Nov 2019 07:10) (96% - 97%)    General: well developed, well nourished, NAD  Neuro: alert and oriented, no focal deficits, moves all extremities spontaneously  HEENT: NCAT, EOMI, anicteric, mucosa moist  Respiratory: airway patent, respirations unlabored  CVS: regular rate and rhythm  Abdomen: soft, mildly distended, nontender.  Extremities: no edema, sensation and movement grossly intact  Skin: warm, dry, appropriate color

## 2019-11-20 NOTE — ED ADULT NURSE NOTE - OBJECTIVE STATEMENT
69 yr old male arrived to the ED from home c/o vomiting. per pt he was admitted here last week for partial SBO with similar symptoms. pt states he had 3 episodes of vomiting. pt denies any abd pain. upon 69 yr old male arrived to the ED from home c/o vomiting. HX lymphoma, on chemo. per pt he was admitted here last week for partial SBO with similar symptoms. pt states he had 3 episodes of vomiting. pt denies any abd pain. upon assessment pt is a&ox4, speaking clearly ,answering questions and following commands. lungs clear tasha. respirations are even and unlabored. abd is soft, non tender. pt denies fever, chills, nausea, vomiting chest pain and sob

## 2019-11-20 NOTE — H&P ADULT - PROBLEM SELECTOR PLAN 1
-Admit to surgery  -NPO/NGT - NGT put out 1000 of gastric contents. Will get CXR to confirm placement.   -OOB as tolerated  -Spoke with Dr. Kingston's team regarding his next dose of R-CHOP tomorrow. While admitted for this acute episode patient will not receive any further chemotherapy. Heme/onc team will see the patient and help coordinate long term plans.   -SCDs and Lovenox for VTE prophylaxis  -IVF resuscitation  -Continue synthroid, metoprolol, and omeprazole from home meds IV  -Discussed with colorectal surgery fellow

## 2019-11-20 NOTE — H&P ADULT - HISTORY OF PRESENT ILLNESS
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 Pt was in the ER 1 week ago for a partial SBO secondary to a known jejunal mass. While in the ER he passed flatus and tolerated PO so he was discharged. He's coming back in today with 3 episodes of NBNB emesis overnight. He also reports LLQ 9/10 stabbing, intermittent abdominal pain that started last night. Last episode of flatus and BM were yesterday afternoon (no BRBPR or melena). No fever, chills, lightheadedness, dizziness, chest pain, SOB.     Last colonoscopy was in August 2019 for anemia. Colonoscopy was wnl, but patient had abdominal pain afterwards and CT scan showed the jejunal mass. Biopsied in September 2019 and shown to be B cell lymphoma. 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 Pt was in the ER 1 week ago for a partial SBO secondary to a known jejunal mass. While in the ER he passed flatus and tolerated PO so he was discharged. He reports tolerating a diet without any issues for the past 5 days. He's coming back in today with 3 episodes of NBNB emesis overnight. He also reports LLQ 9/10 stabbing, intermittent abdominal pain that started last night. Last episode of flatus and BM were yesterday afternoon (no BRBPR or melena). No fever, chills, lightheadedness, dizziness, chest pain, SOB.     Last colonoscopy was in August 2019 for anemia. Colonoscopy was wnl, but patient had abdominal pain afterwards and CT scan showed the jejunal mass. Biopsied in September 2019 and shown to be B cell lymphoma.

## 2019-11-20 NOTE — ED PROVIDER NOTE - PROGRESS NOTE DETAILS
radiology called attng, reportedly same transition point as last scan with potentially worse dilation or bowel loop. Surgery paged and will see pt. - Ismael Faria PA-C surg evaluated, advised to admit to dr. nuno weber. they will insert NG tube at bedside. stable for admission. - Ismael Faria PA-C

## 2019-11-20 NOTE — ED PROVIDER NOTE - CLINICAL SUMMARY MEDICAL DECISION MAKING FREE TEXT BOX
Abd pain + N/V in setting of lymphoma and recent partial SBO. Pt well appearing, HD stable, abdomen benign. Will assess for obstruction and other intraabdominal pathology with CT.

## 2019-11-20 NOTE — ED ADULT NURSE REASSESSMENT NOTE - NS ED NURSE REASSESS COMMENT FT1
received report from kaila GARNICA. patient denies any pain/discomfort at this time. patient is aaox3, lung sounds CTA bilaterally, radial pulses +2, cap refill <3, abdomen distended, tender on palpation and rigid. patient had 3 episodes of vomiting during the night, but is not experiencing any vomiting right now. VSS. pending CTr. will continue to monitor. received report from kaila GARNICA. patient denies any pain/discomfort at this time. patient is aaox3, lung sounds CTA bilaterally, radial pulses +2, cap refill <3, abdomen distended, tender on palpation and rigid. patient had 3 episodes of vomiting during the night, but is not experiencing any vomiting right now. patient denies chest pain, SOB, v/d, back pain, abd pain, fever chills, weakness, numbness or tingling, cough, dysuria. VSS. pending CTr. will continue to monitor. received report from kaila GARNICA. patient denies any pain/discomfort at this time. patient is aaox3, lung sounds CTA bilaterally, radial pulses +2, cap refill <3, abdomen distended, tender on palpation and rigid. patient had 3 episodes of vomiting during the night, but is not experiencing any vomiting right now. patient denies chest pain, SOB, v/d, back pain, abd pain, fever chills, weakness, numbness or tingling, cough, dysuria. VSS. pending CTr. will continue to monitor. patient port was accessed last night, fluids running at 30ml/hr. received report from kaila GARNICA. patient denies any pain/discomfort at this time. patient is aaox3, lung sounds CTA bilaterally, radial pulses +2, cap refill <3, abdomen distended, tender on palpation and rigid. patient had 3 episodes of vomiting during the night, but is not experiencing any vomiting right now. patient denies chest pain, SOB, v/d, back pain, abd pain, fever chills, weakness, numbness or tingling, cough, dysuria. VSS. pending CTr. will continue to monitor. patient port was accessed last night, NS running at 30ml/hr.

## 2019-11-20 NOTE — ED PROVIDER NOTE - ATTENDING CONTRIBUTION TO CARE
recent admission for partial SBO, now with recurrent pain, nausea. on exam, pt distended, only minimally tender, HD stable. will obtain CT to assess for recurrent SBO or any other acute process.

## 2019-11-20 NOTE — ED ADULT NURSE REASSESSMENT NOTE - NS ED NURSE REASSESS COMMENT FT1
patient resting comfortably on stretcher. patient denies pain/discomfort right now. will continue to monitor. family at bedside. patient pending bed on admission. NGT draining 400cc. VSS.

## 2019-11-20 NOTE — ED ADULT NURSE REASSESSMENT NOTE - NS ED NURSE REASSESS COMMENT FT1
patient resting comfortably on stretcher. patient denies pain/discomfort. will continue to monitor. patient family at bedside. VSS.

## 2019-11-20 NOTE — CONSULT NOTE ADULT - ASSESSMENT
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 Vashti Calero,   Hematology/Oncology Fellow, PGY5  Pager: [phone]

## 2019-11-20 NOTE — CONSULT NOTE ADULT - SUBJECTIVE AND OBJECTIVE BOX
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  Pt initially found to have small bowel mass 8/2019.  Saw Dr. Kingston at [facility] for dx of DLBCL, started on RCHOP on in 9/2019. Last week presented to ED with pain, found to have partial SBO.  He was scheduled to see surg onc today, however pain persisted and he came back to ED.  He had 3 episodes NBNB emesis overnight as well as LLQ abdominal pain.  CT A/P showing again SBO with transition point at level of jejunal mass with internal increase in dilation of involved bowel loops.  NGT placed by surgery.    PAST MEDICAL & SURGICAL HISTORY:  Hypothyroidism  CAD (coronary artery disease): s/p stent in LAD (2013) - no longer on anticoagulation  Lymphoma  Sleep apnea  Prostate cancer: s/p cyber knife 2008  Hypercholesteremia  HTN (hypertension)  Uvular hypertrophy	    MEDICATIONS  (STANDING):  enoxaparin Injectable 40 milliGRAM(s) SubCutaneous daily  lactated ringers. 1000 milliLiter(s) (100 mL/Hr) IV Continuous <Continuous>  levothyroxine Injectable 50 MICROGram(s) IV Push at bedtime  metoprolol tartrate Injectable 5 milliGRAM(s) IV Push every 6 hours  pantoprazole  Injectable 40 milliGRAM(s) IV Push daily    MEDICATIONS  (PRN):  tetracaine/benzocaine/butamben Spray 1 Spray(s) Topical every 4 hours PRN sore throat      Allergies    No Known Allergies    Intolerances        SOCIAL HISTORY:    FAMILY HISTORY:  Family history of colorectal cancer: mom - questionable, unknown official diagnosis 1966  Family history of lymphoma: father      Vital Signs Last 24 Hrs  T(C): 36.8 (20 Nov 2019 07:10), Max: 36.8 (20 Nov 2019 07:10)  T(F): 98.2 (20 Nov 2019 07:10), Max: 98.2 (20 Nov 2019 07:10)  HR: 68 (20 Nov 2019 07:10) (67 - 85)  BP: 128/72 (20 Nov 2019 07:10) (123/85 - 142/74)  BP(mean): --  RR: 18 (20 Nov 2019 07:10) (17 - 19)  SpO2: 97% (20 Nov 2019 07:10) (96% - 97%)    PHYSICAL EXAM:    GENERAL: NAD, AAOx3   HEAD:  NC/AT  EYES: EOMI, PERRLA, no scleral icterus  HEENT: Moist mucous membranes  LUNG: Clear to auscultation bilaterally; No rales, rhonchi, wheezing, or rubs  HEART: RRR; No murmurs, rubs, or gallops  ABDOMEN: +BS, ST/ND/NT  EXTREMITIES:  2+ Peripheral Pulses, No clubbing, cyanosis, or edema  LAD: no palpable adenopathy    LABS:                        11.6   9.36  )-----------( 378      ( 20 Nov 2019 04:56 )             37.3     11-20    140  |  96  |  19  ----------------------------<  131<H>  4.1   |  27  |  1.13    Ca    10.2      20 Nov 2019 04:56    TPro  7.9  /  Alb  5.2<H>  /  TBili  0.4  /  DBili  x   /  AST  28  /  ALT  49<H>  /  AlkPhos  122<H>  11-20              RADIOLOGY & ADDITIONAL STUDIES:

## 2019-11-20 NOTE — H&P ADULT - NSHPLABSRESULTS_GEN_ALL_CORE
11.6   9.36  )-----------( 378      ( 20 Nov 2019 04:56 )             37.3       11-20    140  |  96  |  19  ----------------------------<  131<H>  4.1   |  27  |  1.13    Ca    10.2      20 Nov 2019 04:56    TPro  7.9  /  Alb  5.2<H>  /  TBili  0.4  /  DBili  x   /  AST  28  /  ALT  49<H>  /  AlkPhos  122<H>  11-20                            CAPILLARY BLOOD GLUCOSE            < from: CT Abdomen and Pelvis w/ IV Cont (11.20.19 @ 06:46) >    IMPRESSION:     Small bowel obstruction with transition point at the level of the jejunal   mass as seen on prior CT with interval increase in dilation of the   involved bowel loops.     < end of copied text >

## 2019-11-20 NOTE — CONSULT NOTE ADULT - ATTENDING COMMENTS
Pt with output from NGT. Hypoactive BS. Will hold off on further RCHOP chemotherapy while he is receiving intervention for SBO.

## 2019-11-20 NOTE — ED ADULT NURSE REASSESSMENT NOTE - NS ED NURSE REASSESS COMMENT FT1
patient resting comfortably in bed. NGT placed by surgery MD. drainage of 850cc of green/brown liquid. patient tolerated well. canister changed and hooked to suction. patient resting comfortably in bed. denies pain/discomfort at this time. pending admission to the floor. will continue to monitor. patient safety and comfort maintained. patient resting comfortably in bed. NGT placed by surgery MD in right nare. drainage of 850cc of green/brown liquid. patient tolerated well. canister changed and hooked to  regular suction. patient resting comfortably in bed. denies pain/discomfort at this time. pending admission to the floor. will continue to monitor. patient safety and comfort maintained.

## 2019-11-21 ENCOUNTER — APPOINTMENT (OUTPATIENT)
Dept: INFUSION THERAPY | Facility: HOSPITAL | Age: 69
End: 2019-11-21

## 2019-11-21 LAB
ALBUMIN SERPL ELPH-MCNC: 3.9 G/DL — SIGNIFICANT CHANGE UP (ref 3.3–5)
ALP SERPL-CCNC: 98 U/L — SIGNIFICANT CHANGE UP (ref 40–120)
ALT FLD-CCNC: 29 U/L — SIGNIFICANT CHANGE UP (ref 10–45)
ANION GAP SERPL CALC-SCNC: 12 MMOL/L — SIGNIFICANT CHANGE UP (ref 5–17)
AST SERPL-CCNC: 18 U/L — SIGNIFICANT CHANGE UP (ref 10–40)
BILIRUB SERPL-MCNC: 0.5 MG/DL — SIGNIFICANT CHANGE UP (ref 0.2–1.2)
BUN SERPL-MCNC: 18 MG/DL — SIGNIFICANT CHANGE UP (ref 7–23)
CALCIUM SERPL-MCNC: 9.1 MG/DL — SIGNIFICANT CHANGE UP (ref 8.4–10.5)
CHLORIDE SERPL-SCNC: 102 MMOL/L — SIGNIFICANT CHANGE UP (ref 96–108)
CHOLEST SERPL-MCNC: 117 MG/DL — SIGNIFICANT CHANGE UP (ref 10–199)
CO2 SERPL-SCNC: 30 MMOL/L — SIGNIFICANT CHANGE UP (ref 22–31)
CREAT SERPL-MCNC: 1.06 MG/DL — SIGNIFICANT CHANGE UP (ref 0.5–1.3)
GLUCOSE BLDC GLUCOMTR-MCNC: 87 MG/DL — SIGNIFICANT CHANGE UP (ref 70–99)
GLUCOSE SERPL-MCNC: 81 MG/DL — SIGNIFICANT CHANGE UP (ref 70–99)
HCT VFR BLD CALC: 31.7 % — LOW (ref 39–50)
HDLC SERPL-MCNC: 35 MG/DL — LOW
HGB BLD-MCNC: 9.9 G/DL — LOW (ref 13–17)
LIPID PNL WITH DIRECT LDL SERPL: 44 MG/DL — SIGNIFICANT CHANGE UP
MAGNESIUM SERPL-MCNC: 2.2 MG/DL — SIGNIFICANT CHANGE UP (ref 1.6–2.6)
MCHC RBC-ENTMCNC: 25.4 PG — LOW (ref 27–34)
MCHC RBC-ENTMCNC: 31.2 GM/DL — LOW (ref 32–36)
MCV RBC AUTO: 81.3 FL — SIGNIFICANT CHANGE UP (ref 80–100)
NRBC # BLD: 0 /100 WBCS — SIGNIFICANT CHANGE UP (ref 0–0)
PHOSPHATE SERPL-MCNC: 3.2 MG/DL — SIGNIFICANT CHANGE UP (ref 2.5–4.5)
PLATELET # BLD AUTO: 279 K/UL — SIGNIFICANT CHANGE UP (ref 150–400)
POTASSIUM SERPL-MCNC: 3.7 MMOL/L — SIGNIFICANT CHANGE UP (ref 3.5–5.3)
POTASSIUM SERPL-SCNC: 3.7 MMOL/L — SIGNIFICANT CHANGE UP (ref 3.5–5.3)
PROT SERPL-MCNC: 5.9 G/DL — LOW (ref 6–8.3)
RBC # BLD: 3.9 M/UL — LOW (ref 4.2–5.8)
RBC # FLD: 25 % — HIGH (ref 10.3–14.5)
SODIUM SERPL-SCNC: 144 MMOL/L — SIGNIFICANT CHANGE UP (ref 135–145)
TOTAL CHOLESTEROL/HDL RATIO MEASUREMENT: 3.3 RATIO — LOW (ref 3.4–9.6)
TRIGL SERPL-MCNC: 189 MG/DL — HIGH (ref 10–149)
WBC # BLD: 4.98 K/UL — SIGNIFICANT CHANGE UP (ref 3.8–10.5)
WBC # FLD AUTO: 4.98 K/UL — SIGNIFICANT CHANGE UP (ref 3.8–10.5)

## 2019-11-21 PROCEDURE — 99222 1ST HOSP IP/OBS MODERATE 55: CPT

## 2019-11-21 PROCEDURE — 71045 X-RAY EXAM CHEST 1 VIEW: CPT | Mod: 26

## 2019-11-21 PROCEDURE — 99232 SBSQ HOSP IP/OBS MODERATE 35: CPT

## 2019-11-21 RX ORDER — DEXTROSE MONOHYDRATE, SODIUM CHLORIDE, AND POTASSIUM CHLORIDE 50; .745; 4.5 G/1000ML; G/1000ML; G/1000ML
1000 INJECTION, SOLUTION INTRAVENOUS
Refills: 0 | Status: DISCONTINUED | OUTPATIENT
Start: 2019-11-21 | End: 2019-11-21

## 2019-11-21 RX ORDER — CHLORHEXIDINE GLUCONATE 213 G/1000ML
1 SOLUTION TOPICAL DAILY
Refills: 0 | Status: DISCONTINUED | OUTPATIENT
Start: 2019-11-21 | End: 2019-11-26

## 2019-11-21 RX ORDER — SODIUM CHLORIDE 9 MG/ML
10 INJECTION INTRAMUSCULAR; INTRAVENOUS; SUBCUTANEOUS
Refills: 0 | Status: DISCONTINUED | OUTPATIENT
Start: 2019-11-21 | End: 2019-11-26

## 2019-11-21 RX ORDER — CHLORHEXIDINE GLUCONATE 213 G/1000ML
1 SOLUTION TOPICAL
Refills: 0 | Status: DISCONTINUED | OUTPATIENT
Start: 2019-11-21 | End: 2019-11-26

## 2019-11-21 RX ORDER — ELECTROLYTE SOLUTION,INJ
1 VIAL (ML) INTRAVENOUS
Refills: 0 | Status: DISCONTINUED | OUTPATIENT
Start: 2019-11-21 | End: 2019-11-21

## 2019-11-21 RX ORDER — INFLUENZA VIRUS VACCINE 15; 15; 15; 15 UG/.5ML; UG/.5ML; UG/.5ML; UG/.5ML
0.5 SUSPENSION INTRAMUSCULAR ONCE
Refills: 0 | Status: COMPLETED | OUTPATIENT
Start: 2019-11-21 | End: 2019-11-21

## 2019-11-21 RX ORDER — POTASSIUM CHLORIDE 20 MEQ
10 PACKET (EA) ORAL
Refills: 0 | Status: COMPLETED | OUTPATIENT
Start: 2019-11-21 | End: 2019-11-21

## 2019-11-21 RX ORDER — SODIUM CHLORIDE 9 MG/ML
1000 INJECTION, SOLUTION INTRAVENOUS ONCE
Refills: 0 | Status: COMPLETED | OUTPATIENT
Start: 2019-11-21 | End: 2019-11-21

## 2019-11-21 RX ADMIN — Medication 50 MICROGRAM(S): at 22:18

## 2019-11-21 RX ADMIN — Medication 100 MILLIEQUIVALENT(S): at 17:33

## 2019-11-21 RX ADMIN — SODIUM CHLORIDE 1000 MILLILITER(S): 9 INJECTION, SOLUTION INTRAVENOUS at 11:02

## 2019-11-21 RX ADMIN — Medication 5 MILLIGRAM(S): at 07:13

## 2019-11-21 RX ADMIN — ENOXAPARIN SODIUM 40 MILLIGRAM(S): 100 INJECTION SUBCUTANEOUS at 12:15

## 2019-11-21 RX ADMIN — PANTOPRAZOLE SODIUM 40 MILLIGRAM(S): 20 TABLET, DELAYED RELEASE ORAL at 12:16

## 2019-11-21 RX ADMIN — Medication 100 MILLIEQUIVALENT(S): at 15:20

## 2019-11-21 RX ADMIN — Medication 100 MILLIEQUIVALENT(S): at 12:15

## 2019-11-21 RX ADMIN — Medication 83 EACH: at 20:00

## 2019-11-21 RX ADMIN — Medication 5 MILLIGRAM(S): at 22:18

## 2019-11-21 RX ADMIN — Medication 5 MILLIGRAM(S): at 17:37

## 2019-11-21 RX ADMIN — Medication 5 MILLIGRAM(S): at 12:16

## 2019-11-21 NOTE — CONSULT NOTE ADULT - CONSULT REQUESTED BY NAME
Dr Cunha Reason for Disposition   MODERATE pain (e.g., interferes with normal activities, limping) and present > 3 days    Protocols used: ANKLE PAIN-ADULT-OH    Patient called pre-service center [facility]) to schedule appointment, with red flag complaint, transferred to RN access for triage. Pt c/o left ankle pain and swelling x 3 days. No injury. Skin is not discolored. Pt states she has been using aleve for the pain. Rates pain as 6/10. Is still able to bear weight on left ankle, it is just painful. Triage indicates for pt to be seen in the office in the next 3 days. Writer provided warm transfer to [facility] for appointment scheduling.

## 2019-11-21 NOTE — PATIENT PROFILE ADULT - HARM RISK FACTORS
On home metformin, though daughter is reportedly non-compliant in dosing her medications.  - f/u HbA1c  - MISS  - monitor FSG On home metformin, though daughter is reportedly non-compliant in dosing her medications.  - f/u HbA1c  - monitor FSG, add ISS if indicated no S/p AICD. Last echo in 02/2018 with EF 60%, mild TR, trace MR. Patient on home Coreg, losartan, ASA, and Crestor, though daughter does not know doses. No crackles, JVD, or peripheral edema on exam.  - per pharmacy, patient on Coreg 6.25mg BID, losartan 25mg qd  - hold anti-hypertensives in setting of ongoing infection  - continue Lipitor 40mg qhs as TI for home Crestor 10mg  - f/u echo  - cautious use of IVF

## 2019-11-21 NOTE — DIETITIAN INITIAL EVALUATION ADULT. - NS FNS REASON FOR WEIGHT CHANG
altered GI function (specify)/decreased po intake/poor appetite, dysgeusia, abdominal pain secondary to B cell lymphoma; now with jejunal mass

## 2019-11-21 NOTE — PATIENT PROFILE ADULT - NSPROHMONCTXGIVEN_GEN_A_NUR
Electrophysiology FU Note      Chief complaint :  Chest pain, palpitations    Referring physician:        Primary care physician: Elli Craig DO      History of Present Illness: This visit (12/12/2018)      Chief Complaint   Patient presents with    Chest Pain      patient sent here from PCP visit this morning. Pt c/o chest pains, radiating into neck, shoulder and hip pain. Sob with activity, palpitations, migraines, dizziness and cold sweats. 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 Symptoms are getting worse after experiencing them for the past few months. Pt denies edema.  Shortness of Breath    Palpitations    Dizziness           Previous visit:2/10/2016)      Chief Complaint   Patient presents with    6 Month Follow-Up     Patient is here for 6 month OV, he denies any chest pain, palpitations, dizziness, SOB or edema. Pt wants to discuss some of his medications with you. Over all he feels lot better  He reports arthralgia at times - question of medication induced  Metoprolol makes him tired for some time after he takes it. Previous visit: (7/10/2015)      Chief Complaint   Patient presents with    Check-Up     2 month follow up. Denies dizziness, edema, and palpitations. He hasn't felt like he has been in A-Fib since having the ablation. Had great energies and was able to work with out any problem till he has come down with this URI       Chest Congestion     Patient states that for the last 3 weeks he has been feeling a congestion in his chest. He says it feels like he might be getting pneumonia. When he lays down it feels like there is a weight on his chest. He says he also feels achy in his joints. He was too scared to take anything that might cause him to go back into A-Fib. Sputum - yellowish      Shortness of Breath     Patient states that he feels short of breath with stairs only this started with URI symptoms.       Denies chest chemotherapy

## 2019-11-21 NOTE — CHART NOTE - NSCHARTNOTEFT_GEN_A_CORE
Upon Nutritional Assessment by the Registered Dietitian your patient was determined to meet criteria / has evidence of the following diagnosis/diagnoses:          [ ]  Mild Protein Calorie Malnutrition        [ ]  Moderate Protein Calorie Malnutrition        [X ] Severe Protein Calorie Malnutrition (in context of chronic illness)        [ ] Unspecified Protein Calorie Malnutrition        [ ] Underweight / BMI <19        [ ] Morbid Obesity / BMI > 40      Findings as based on:  [X ] Comprehensive nutrition assessment   [ ] Nutrition Focused Physical Exam  [X ] Other: 21% weight loss in ~2 months, po intake <75% of needs > 2 months.      Nutrition Plan/Recommendations:    Recommend 1/2 dose, lipid-free TPN on day 1; monitor labs/lytes and advance as tolerated. Trend BG; monitor need for insulin in TPN. Recommend continue IVD5 1/2NS with KCL 20meq/L until TPN infusion is initiated.    TPN recommendations as appropriate:   105 grams amino acids (700ml 15% a.a.)  280 grams dextrose (400ml 70% dex)  35 grams SMOFlipid (1751ml 20%IVFE @ 14.6ml/hr x 12 hours)    Total Calories: 1722 vicky/day (27cal/Kg per dosing wt 64.9Kg)  Total Protein: 105 Gm/day (1.6Gm/Kg per dosing wt 64.9Kg)  Micronutrients: 10ml MVI, 3ml MTE-5    Non-Protein Calories: 1302 vicky/day (20cal/Kg)  Dextrose Infusion Rate: 3 mg/Kg/min  Lipid Infusion Rate: 0.54 Gm/Kg/day; 0.04 Gm/Kg/hr      PROVIDER Section:     By signing this assessment you are acknowledging and agree with the diagnosis/diagnoses assigned by the Registered Dietitian    Comments:

## 2019-11-21 NOTE — PROGRESS NOTE ADULT - SUBJECTIVE AND OBJECTIVE BOX
No acute events overnight    SUBJECTIVE:  Reports feeling "not so good" but denies pain  Denies nausea, vomiting, diarrhea  NGT uncomfrotable  Is not passing gas and having bowel movements  Ambulating independently    OBJECTIVE:  Vital Signs Last 24 Hrs  T(C): 36.9 (21 Nov 2019 09:34), Max: 36.9 (20 Nov 2019 22:16)  T(F): 98.4 (21 Nov 2019 09:34), Max: 98.5 (20 Nov 2019 22:16)  HR: 60 (21 Nov 2019 09:34) (54 - 67)  BP: 154/84 (21 Nov 2019 09:34) (126/65 - 158/75)  BP(mean): --  RR: 18 (21 Nov 2019 09:34) (18 - 19)  SpO2: 97% (21 Nov 2019 09:34) (96% - 99%)      Physical Examination:  GEN: NAD, resting quietly  NEURO: AAOx3, CN II-XII grossly intact, no focal deficits  PULM: symmetric chest rise bilaterally, no increased WOB  ABD: soft, nontender, nondistended. NGT in place  EXTR: no cyanosis or edema, moving all extremities    LABS:                        9.9    4.98  )-----------( 279      ( 21 Nov 2019 08:48 )             31.7       11-21    144  |  102  |  18  ----------------------------<  81  3.7   |  30  |  1.06    Ca    9.1      21 Nov 2019 08:47  Phos  3.2     11-21  Mg     2.2     11-21    TPro  5.9<L>  /  Alb  3.9  /  TBili  0.5  /  DBili  x   /  AST  18  /  ALT  29  /  AlkPhos  98  11-21

## 2019-11-21 NOTE — DIETITIAN INITIAL EVALUATION ADULT. - PERTINENT LABORATORY DATA
11-21 @ 08:48: Hemoglobin 9.9<L>, Hematocrit 31.7<L>  11-21 @ 08:47: Sodium 144, Potassium 3.7, Chloride 102, Calcium 9.1, Magnesium 2.2, Phosphorus 3.2, BUN 18, Creatinine 1.06, BG 81, Alk Phos 98, ALT/SGPT 29, AST/SGOT 18, Total Protein 5.9<L>, Albumin 3.9, Total Bilirubin 0.5

## 2019-11-21 NOTE — DIETITIAN INITIAL EVALUATION ADULT. - PHYSICAL APPEARANCE
ht: 5 feet 4 inches, admit wt: 143 pounds, BMI: 24.6 Kg/m2, IBW: 130 pounds (+/- 10%), 110% IBW/other (specify) Edema: none noted  Skin: no pressure injuries noted per nursing flowsheet  Nutrition Focused Physical Exam: Edema: none noted  Skin: no pressure injuries noted per nursing flowsheet  Nutrition Focused Physical Exam: Pt noted with mild muscle depletion of temples and clavicles.

## 2019-11-21 NOTE — CONSULT NOTE ADULT - ASSESSMENT
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 Pt was in the ER 1 week ago for a partial SBO secondary to a known jejunal mass. While in the ER he passed flatus and tolerated PO so he was discharged. He reports tolerating a diet without any issues for the past 5 days. He's coming back in today with 3 episodes of NBNB emesis overnight.  rom:     CT Abdomen and Pelvis w/ IV Cont (11.20.19 @ 06:46) >    	Small bowel obstruction with transition point at the level of the jejunal   	mass as seen on prior CT with interval increase in dilation of the   	involved bowel loops.     	    #SBO secondary to a known jejunal mass  plan for surgery likely on Tuesday  npo, NGT, IVF  surgery mgmt appreciated    #Bcell lymphoma- was due for 3rd round of chemo 11/21  heme onc fu appreciated  Will hold off on further RCHOP chemotherapy while he is receiving intervention for SBO.      #HTN: cont metoprolol iv    #hypothyroidism: synthroid    GI ppx  DVT ppx

## 2019-11-21 NOTE — CONSULT NOTE ADULT - ASSESSMENT
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      Pt remains NPO w/ NGT;  TPN consulted for Pt w/ Protein-Calorie Malnutrition    TPN to start today at 1/2strength:  AA 60g, Dextrose 140g, and 0Lipids in 2L    TPN recommendations as appropriate:   105 grams amino acids (700ml 15% a.a.)  280 grams dextrose (400ml 70% dex)  35 grams SMOFlipid (1751ml 20%IVFE @ 14.6ml/hr x 12 hours)    Total Calories: 1722 vicky/day (27cal/Kg per dosing wt 64.9Kg)  Total Protein: 105 Gm/day (1.6Gm/Kg per dosing wt 64.9Kg)  Micronutrients: 10ml MVI, 3ml MTE-5    Non-Protein Calories: 1302 vicky/day (20cal/Kg)  Dextrose Infusion Rate: 3 mg/Kg/min  Lipid Infusion Rate: 0.54 Gm/Kg/day; 0.04 Gm/Kg/hr      1. Dedicated Central line must be placed for TPN  2. Strict Intake and Output.  3. Weights three times a week  4. Monitor BMP, Mg, Ionized Ca, Phosphorus daily  5. Check Triglycerides daily for first 3 days of TPN, then monthly   6. Pre-albumin weekly.  7. Fingersticks to monitor glucose every 6 hours until stable then may be decreased to twice a day,           coverage with ISS - to be ordered by primary team           Elevated serum glucose- HgA1c w/ am labs  8. Volume depletion - improving -Initiate TPN w/           NaCl 40mEq, NaAce 60mEq, NaPhos 30mMol, KCl 80mEq           HyperCa resolved w/ hydration - 10mEq Ca and 12mEqMg  9. Hem/ Onc appreciated  Continue as per Surgery, will follow with you, D/w primary team    Kendy Duque PA-C  TPN team, pager [phone]  D/w Corey Montes [facility]

## 2019-11-21 NOTE — PROGRESS NOTE ADULT - ASSESSMENT
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 Pt was in the ER 1 week ago for a partial SBO secondary to a known jejunal mass but passed flatus and tolerated PO so was d/c'd. He is now NPO with NGT for SBO 2/2 jejunal mass.     -continue NGT/NPO/IVf  - could d/c NGT if flatus/BM  - will need operation this admission, likely Tuesday  - f/u low UOP  - f/u hematology plan regarding B cell lymphoma and current chemotherapy

## 2019-11-21 NOTE — DIETITIAN INITIAL EVALUATION ADULT. - PERTINENT MEDS FT
MEDICATIONS  (STANDING):  dextrose 5% + sodium chloride 0.45% with potassium chloride 20 mEq/L 1000 milliLiter(s) (100 mL/Hr) IV Continuous <Continuous>  enoxaparin Injectable 40 milliGRAM(s) SubCutaneous daily  influenza   Vaccine 0.5 milliLiter(s) IntraMuscular once  levothyroxine Injectable 50 MICROGram(s) IV Push at bedtime  metoprolol tartrate Injectable 5 milliGRAM(s) IV Push every 6 hours  pantoprazole  Injectable 40 milliGRAM(s) IV Push daily  Parenteral Nutrition - Adult 1 Each (83 mL/Hr) TPN Continuous <Continuous>  potassium chloride  10 mEq/100 mL IVPB 10 milliEquivalent(s) IV Intermittent every 1 hour

## 2019-11-21 NOTE — CONSULT NOTE ADULT - SUBJECTIVE AND OBJECTIVE BOX
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 Pt was in the ER 1 week ago for a partial SBO secondary to a known jejunal mass. While in the ER he passed flatus and tolerated PO so he was discharged. He reports tolerating a diet without any issues for the 5 days inbtw.  Pt came back in on day of admission with 3 episodes of NBNB emesis overnight. He also reports LLQ 9/10 stabbing, intermittent abdominal pain that started last night. Last episode of flatus and BM were yesterday afternoon (no BRBPR or melena). No fever, sweats, chills, lightheadedness, dizziness, chest pain, SOB, dyspnea, palp, cough.  Pt hasn't had BM nor flatus in days.    Last colonoscopy was in August 2019 for anemia. Colonoscopy was wnl, but patient had abdominal pain afterwards and CT scan showed the jejunal mass. Biopsied in September 2019 and shown to be B cell lymphoma.  TPN consult requested to assist w/ management of pt's nutrition while being NPO for a prolonged period of time.  All questions asked and answered to pt's satisfaction.    Current Diet: Diet, NPO (11-20-19 @ 10:12)    Appetite: [ x ]Poor [  ]Adequate [  ]Good  Caloric intake:  [   ]  Adequate   [ x  ] Inadequate      MEDICATIONS  (STANDING):  chlorhexidine 2% Cloths 1 Application(s) Topical daily  dextrose 5% + sodium chloride 0.45% with potassium chloride 20 mEq/L 1000 milliLiter(s) (100 mL/Hr) IV Continuous <Continuous>  enoxaparin Injectable 40 milliGRAM(s) SubCutaneous daily  influenza   Vaccine 0.5 milliLiter(s) IntraMuscular once  levothyroxine Injectable 50 MICROGram(s) IV Push at bedtime  metoprolol tartrate Injectable 5 milliGRAM(s) IV Push every 6 hours  pantoprazole  Injectable 40 milliGRAM(s) IV Push daily  Parenteral Nutrition - Adult 1 Each (83 mL/Hr) TPN Continuous <Continuous>  potassium chloride  10 mEq/100 mL IVPB 10 milliEquivalent(s) IV Intermittent every 1 hour    MEDICATIONS  (PRN):  benzocaine 15 mG/menthol 3.6 mG Lozenge 1 Lozenge Oral three times a day PRN Sore Throat  tetracaine/benzocaine/butamben Spray 1 Spray(s) Topical every 4 hours PRN sore throat      PAST MEDICAL & SURGICAL HISTORY:  Hypothyroidism  CAD (coronary artery disease): s/p stent in LAD (2013) - no longer on anticoagulation  Lymphoma  Sleep apnea  Prostate cancer: s/p cyber knife 2008  Hypercholesteremia  HTN (hypertension)  Uvular hypertrophy      FAMILY HISTORY:  Family history of colorectal cancer: mom - questionable, unknown official diagnosis 1966  Family history of lymphoma: father      Social History:  ; Denies current smoking, ETOH, drugs    ALLERGIES  No Known Allergies      REVIEW OF SYSTEMS  --------------------------------------------------------------------------------  General/ GI see HPI  all other systems negative    VITALS  T(C): 36.7 (11-21-19 @ 13:15), Max: 36.9 (11-20-19 @ 22:16)  HR: 61 (11-21-19 @ 13:15) (54 - 61)  BP: 140/78 (11-21-19 @ 13:15) (126/65 - 158/75)  RR: 18 (11-21-19 @ 13:15) (18 - 19)  SpO2: 94% (11-21-19 @ 13:15) (94% - 98%)  I&O's Detail    20 Nov 2019 07:01  -  21 Nov 2019 07:00  --------------------------------------------------------  IN:    lactated ringers.: 1200 mL  Total IN: 1200 mL    OUT:    Nasoenteral Tube: 500 mL    Voided: 550 mL  Total OUT: 1050 mL    Total NET: 150 mL      21 Nov 2019 07:01  -  21 Nov 2019 15:46  --------------------------------------------------------  IN:  Total IN: 0 mL    OUT:    Voided: 250 mL  Total OUT: 250 mL    Total NET: -250 mL        PHYSICAL EXAM  --------------------------------------------------------------------------------  	Gen: NAD, A&Ox3. WN/WD/WG  	HEENT: NC/AT. PERRL, supple neck, clear oropharynx, mucosa moist  	Pulm: CTA B/L  	CV: RRR,   	GI: No BS, soft, nontender/nondistended              MSK; FROMx4, no deformities              Vascular: Warm, no clubbing, cyanosis, nor edema  	Neuro: No focal deficits, sensation & strength grossly intact  	Psych: Normal affect and mood  	Skin: Warm, without rashes, good turgor  	      LABS:                        9.9    4.98  )-----------( 279      ( 21 Nov 2019 08:48 )             31.7     11-21    144  |  102  |  18  ----------------------------<  81  3.7   |  30  |  1.06    Ca    9.1      21 Nov 2019 08:47  Phos  3.2     11-21  Mg     2.2     11-21    TPro  5.9<L>  /  Alb  3.9  /  TBili  0.5  /  DBili  x   /  AST  18  /  ALT  29  /  AlkPhos  98  11-21      Triglycerides, Serum: 189 mg/dL (11-21-19 @ 12:32)    RADIOLOGY:  < from: Xray Chest 1 View- PORTABLE-Urgent (11.20.19 @ 10:41) >  FINDINGS:    Lines and Tubes: Right sided MediPort with tip in SVC. Enteric tube with   distal tip in stomach.    Lungs: The lungs are clear.    Pleura: No pleural effusion.    Mediastinum: Heart size is normal.    Skeletal: Unremarkable.      IMPRESSION:    Enteric tube with distal tip in stomach.    < from: CT Abdomen and Pelvis w/ IV Cont (11.20.19 @ 06:46) >  FINDINGS:    LOWER CHEST: Partially visualized tip from right chest wall infusion port.    LIVER: Subcentimeter left hepatic hypodensity is too small to   characterize (series 2 image 21).  BILE DUCTS: Normal caliber.  GALLBLADDER: Within normal limits.  SPLEEN: Within normal limits.  PANCREAS: Within normal limits.  ADRENALS: Within normal limits.  KIDNEYS/URETERS: Within normal limits.    BLADDER: Within normal limits.  REPRODUCTIVE ORGANS: Fiducial markers in the prostate. Left inguinal   testis.    BOWEL: Dilated loops ofsmall bowel with transition point at the level of   the jejunal mass as on prior CT. There has been interval increase in the   degree of the dilated loops. Appendix is normal.  PERITONEUM: Trace ascites.  VESSELS: Atherosclerotic changes.  RETROPERITONEUM/LYMPH NODES: No lymphadenopathy.    ABDOMINAL WALL: Within normal limits.  BONES: Within normal limits.    IMPRESSION:     Small bowel obstruction with transition point at the level of the jejunal   mass as seen on prior CT with interval increase in dilation of the   involved bowel loops. 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 Pt was in the ER 1 week ago for a partial SBO secondary to a known jejunal mass. While in the ER he passed flatus and tolerated PO so he was discharged. He reports tolerating a diet without any issues for the 5 days inbtw.  Pt came back in on day of admission with 3 episodes of NBNB emesis overnight. He also reports LLQ 9/10 stabbing, intermittent abdominal pain that started last night. Last episode of flatus and BM were yesterday afternoon (no BRBPR or melena). No fever, sweats, chills, lightheadedness, dizziness, chest pain, SOB, dyspnea, palp, cough.  Pt hasn't had BM nor flatus in days.    Last colonoscopy was in August 2019 for anemia. Colonoscopy was wnl, but patient had abdominal pain afterwards and CT scan showed the jejunal mass. Biopsied in September 2019 and shown to be B cell lymphoma.  TPN consult requested to assist w/ management of pt's nutrition while being NPO for a prolonged period of time.  All questions asked and answered to pt's satisfaction.    Current Diet: Diet, NPO (11-20-19 @ 10:12)    Appetite: [ x ]Poor [  ]Adequate [  ]Good  Caloric intake:  [   ]  Adequate   [ x  ] Inadequate      MEDICATIONS  (STANDING):  chlorhexidine 2% Cloths 1 Application(s) Topical daily  dextrose 5% + sodium chloride 0.45% with potassium chloride 20 mEq/L 1000 milliLiter(s) (100 mL/Hr) IV Continuous <Continuous>  enoxaparin Injectable 40 milliGRAM(s) SubCutaneous daily  influenza   Vaccine 0.5 milliLiter(s) IntraMuscular once  levothyroxine Injectable 50 MICROGram(s) IV Push at bedtime  metoprolol tartrate Injectable 5 milliGRAM(s) IV Push every 6 hours  pantoprazole  Injectable 40 milliGRAM(s) IV Push daily  Parenteral Nutrition - Adult 1 Each (83 mL/Hr) TPN Continuous <Continuous>  potassium chloride  10 mEq/100 mL IVPB 10 milliEquivalent(s) IV Intermittent every 1 hour    MEDICATIONS  (PRN):  benzocaine 15 mG/menthol 3.6 mG Lozenge 1 Lozenge Oral three times a day PRN Sore Throat  tetracaine/benzocaine/butamben Spray 1 Spray(s) Topical every 4 hours PRN sore throat      PAST MEDICAL & SURGICAL HISTORY:  Hypothyroidism  CAD (coronary artery disease): s/p stent in LAD (2013) - no longer on anticoagulation  Lymphoma  Sleep apnea  Prostate cancer: s/p cyber knife 2008  Hypercholesteremia  HTN (hypertension)  Uvular hypertrophy      FAMILY HISTORY:  Family history of colorectal cancer: mom - questionable, unknown official diagnosis 1966  Family history of lymphoma: father      Social History:  ; Denies current smoking, ETOH, drugs    ALLERGIES  No Known Allergies      REVIEW OF SYSTEMS  --------------------------------------------------------------------------------  General/ GI see HPI  all other systems negative    VITALS  T(C): 36.7 (11-21-19 @ 13:15), Max: 36.9 (11-20-19 @ 22:16)  HR: 61 (11-21-19 @ 13:15) (54 - 61)  BP: 140/78 (11-21-19 @ 13:15) (126/65 - 158/75)  RR: 18 (11-21-19 @ 13:15) (18 - 19)  SpO2: 94% (11-21-19 @ 13:15) (94% - 98%)  I&O's Detail    20 Nov 2019 07:01  -  21 Nov 2019 07:00  --------------------------------------------------------  IN:    lactated ringers.: 1200 mL  Total IN: 1200 mL    OUT:    Nasoenteral Tube: 500 mL    Voided: 550 mL  Total OUT: 1050 mL    Total NET: 150 mL      21 Nov 2019 07:01  -  21 Nov 2019 15:46  --------------------------------------------------------  IN:  Total IN: 0 mL    OUT:    Voided: 250 mL  Total OUT: 250 mL    Total NET: -250 mL        PHYSICAL EXAM  --------------------------------------------------------------------------------  	Gen: NAD, A&Ox3. WN/WD/WG  	HEENT: NC/AT. PERRL, supple neck, clear oropharynx, mucosa moist  	Pulm: CTA B/L  	CV: RRR,   	GI: No BS, soft, nontender/nondistended, (+)NGT              MSK; FROMx4, no deformities              Vascular: Warm, no clubbing, cyanosis, nor edema  	Neuro: No focal deficits, sensation & strength grossly intact  	Psych: Normal affect and mood  	Skin: Warm, without rashes, good turgor  	      LABS:                        9.9    4.98  )-----------( 279      ( 21 Nov 2019 08:48 )             31.7     11-21    144  |  102  |  18  ----------------------------<  81  3.7   |  30  |  1.06    Ca    9.1      21 Nov 2019 08:47  Phos  3.2     11-21  Mg     2.2     11-21    TPro  5.9<L>  /  Alb  3.9  /  TBili  0.5  /  DBili  x   /  AST  18  /  ALT  29  /  AlkPhos  98  11-21      Triglycerides, Serum: 189 mg/dL (11-21-19 @ 12:32)    RADIOLOGY:  < from: Xray Chest 1 View- PORTABLE-Urgent (11.20.19 @ 10:41) >  FINDINGS:    Lines and Tubes: Right sided MediPort with tip in SVC. Enteric tube with   distal tip in stomach.    Lungs: The lungs are clear.    Pleura: No pleural effusion.    Mediastinum: Heart size is normal.    Skeletal: Unremarkable.      IMPRESSION:    Enteric tube with distal tip in stomach.    < from: CT Abdomen and Pelvis w/ IV Cont (11.20.19 @ 06:46) >  FINDINGS:    LOWER CHEST: Partially visualized tip from right chest wall infusion port.    LIVER: Subcentimeter left hepatic hypodensity is too small to   characterize (series 2 image 21).  BILE DUCTS: Normal caliber.  GALLBLADDER: Within normal limits.  SPLEEN: Within normal limits.  PANCREAS: Within normal limits.  ADRENALS: Within normal limits.  KIDNEYS/URETERS: Within normal limits.    BLADDER: Within normal limits.  REPRODUCTIVE ORGANS: Fiducial markers in the prostate. Left inguinal   testis.    BOWEL: Dilated loops ofsmall bowel with transition point at the level of   the jejunal mass as on prior CT. There has been interval increase in the   degree of the dilated loops. Appendix is normal.  PERITONEUM: Trace ascites.  VESSELS: Atherosclerotic changes.  RETROPERITONEUM/LYMPH NODES: No lymphadenopathy.    ABDOMINAL WALL: Within normal limits.  BONES: Within normal limits.    IMPRESSION:     Small bowel obstruction with transition point at the level of the jejunal   mass as seen on prior CT with interval increase in dilation of the   involved bowel loops.

## 2019-11-21 NOTE — CONSULT NOTE ADULT - SUBJECTIVE AND OBJECTIVE BOX
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 Pt was in the ER 1 week ago for a partial SBO secondary to a known jejunal mass. While in the ER he passed flatus and tolerated PO so he was discharged. He reports tolerating a diet without any issues for the past 5 days. He's coming back in today with 3 episodes of NBNB emesis overnight. He also reports LLQ 9/10 stabbing, intermittent abdominal pain that started last night. Last episode of flatus and BM were yesterday afternoon (no BRBPR or melena). No fever, chills, lightheadedness, dizziness, chest pain, SOB.     Last colonoscopy was in August 2019 for anemia. Colonoscopy was wnl, but patient had abdominal pain afterwards and CT scan showed the jejunal mass. Biopsied in September 2019 and shown to be B cell lymphoma. (20 Nov 2019 10:15)  this am pt with ngt to suction    PAST MEDICAL & SURGICAL HISTORY:  Hypothyroidism  CAD (coronary artery disease): s/p stent in LAD (2013) - no longer on anticoagulation  Lymphoma  Sleep apnea  Prostate cancer: s/p cyber knife 2008  Hypercholesteremia  HTN (hypertension)  Uvular hypertrophy      FAMILY HISTORY:  Family history of colorectal cancer: mom - questionable, unknown official diagnosis 1966  Family history of lymphoma: father      SOCIAL HISTORY:    Allergies    No Known Allergies    Intolerances          REVIEW OF SYSEMS:  General: no weakness, no fever/chills, no weight loss/gain  Skin/Breast: no rash, no jaundice  Ophthalmologic: no vision changes, no dry eyes   Respiratory and Thorax: no cough, no wheezing, no hemoptysis, no dyspnea  Cardiovascular: no chest pain, no shortness of breath, no orthopnea  Gastrointestinal: no n/v/d, no abdominal pain, no dysphagia   Genitourinary: no dysuria, no frequency, no nocturia, no hematuria  Musculoskeletal: no trauma, no sprain/strain, no myalgias, no arthralgias, no fracture  Neurological: no HA, no dizziness, no weakness, no numbness  Psychiatric: no depression, no SI/HI  Hematology/Lymphatics: no easy bruising  Endocrine: no heat or cold intolerance. no weight gain or loss  Allergic/Immunologic: no allergy or recent reaction       Vital Signs Last 24 Hrs  T(C): 37.1 (21 Nov 2019 17:13), Max: 37.1 (21 Nov 2019 17:13)  T(F): 98.8 (21 Nov 2019 17:13), Max: 98.8 (21 Nov 2019 17:13)  HR: 60 (21 Nov 2019 17:13) (56 - 61)  BP: 158/79 (21 Nov 2019 17:13) (140/78 - 158/79)  BP(mean): --  RR: 18 (21 Nov 2019 17:13) (18 - 18)  SpO2: 97% (21 Nov 2019 17:13) (94% - 97%)  I&O's Summary    20 Nov 2019 07:01  -  21 Nov 2019 07:00  --------------------------------------------------------  IN: 1200 mL / OUT: 1050 mL / NET: 150 mL    21 Nov 2019 07:01  -  21 Nov 2019 20:37  --------------------------------------------------------  IN: 1200 mL / OUT: 900 mL / NET: 300 mL        PHYSICAL EXAM:  GENERAL: NAD, Comfortable  HEAD:  Atraumatic, Normocephalic  EYES: EOMI, PERRLA, conjunctiva and sclera clear  NECK: Supple, No JVD  CHEST/LUNG: Clear to auscultation bilaterally; No wheeze  HEART: Regular rate and rhythm; No murmurs, rubs, or gallops  ABDOMEN: Soft, Nontender, Nondistended; Bowel sounds present  Neuro: AAOx3, no focal deficit, 5/5 b/l extremities  EXTREMITIES:  2+ Peripheral Pulses, No clubbing, cyanosis, or edema  SKIN: No rashes or lesions    LABS:                        9.9    4.98  )-----------( 279      ( 21 Nov 2019 08:48 )             31.7     11-21    144  |  102  |  18  ----------------------------<  81  3.7   |  30  |  1.06    Ca    9.1      21 Nov 2019 08:47  Phos  3.2     11-21  Mg     2.2     11-21    TPro  5.9<L>  /  Alb  3.9  /  TBili  0.5  /  DBili  x   /  AST  18  /  ALT  29  /  AlkPhos  98  11-21      CAPILLARY BLOOD GLUCOSE      POCT Blood Glucose.: 87 mg/dL (21 Nov 2019 20:06)            RADIOLOGY & ADDITIONAL TESTS:    Imaging Personally Reviewed:  [x] YES  [ ] NO    Consultant(s) Notes Reviewed:  [x] YES  [ ] NO      MEDICATIONS  (STANDING):  chlorhexidine 2% Cloths 1 Application(s) Topical daily  chlorhexidine 4% Liquid 1 Application(s) Topical <User Schedule>  enoxaparin Injectable 40 milliGRAM(s) SubCutaneous daily  influenza   Vaccine 0.5 milliLiter(s) IntraMuscular once  levothyroxine Injectable 50 MICROGram(s) IV Push at bedtime  metoprolol tartrate Injectable 5 milliGRAM(s) IV Push every 6 hours  pantoprazole  Injectable 40 milliGRAM(s) IV Push daily  Parenteral Nutrition - Adult 1 Each (83 mL/Hr) TPN Continuous <Continuous>    MEDICATIONS  (PRN):  benzocaine 15 mG/menthol 3.6 mG Lozenge 1 Lozenge Oral three times a day PRN Sore Throat  sodium chloride 0.9% lock flush 10 milliLiter(s) IV Push every 1 hour PRN Pre/post blood products, medications, blood draw, and to maintain line patency  tetracaine/benzocaine/butamben Spray 1 Spray(s) Topical every 4 hours PRN sore throat      Care Discussed with Consultants/Other Providers [x] YES  [ ] NO    HEALTH ISSUES - PROBLEM Dx:  Small bowel obstruction: Small bowel obstruction

## 2019-11-21 NOTE — DIETITIAN INITIAL EVALUATION ADULT. - ADD RECOMMEND
1) TPN per TPN Team/Nutrition assessment. 2) Monitor GI function and medical course. 1) TPN per TPN Team/Nutrition assessment. 2) Monitor GI function and medical course. When medically feasible, advance to clear liquids then Low Fiber diet.

## 2019-11-21 NOTE — PATIENT PROFILE ADULT - FUNCTIONAL SCREEN CURRENT LEVEL: COMMUNICATION, MLM
Detail Level: Detailed Quality 110: Preventive Care And Screening: Influenza Immunization: Influenza Immunization not Administered for Documented Reasons. Quality 111:Pneumonia Vaccination Status For Older Adults: Pneumococcal Vaccination not Administered or Previously Received, Reason not Otherwise Specified 0 = understands/communicates without difficulty

## 2019-11-21 NOTE — DIETITIAN INITIAL EVALUATION ADULT. - ETIOLOGY
inability to meet increased nutrient needs for catabolic illness secondary to poor appetite, dysgeusia, pain

## 2019-11-21 NOTE — DIETITIAN INITIAL EVALUATION ADULT. - OTHER INFO
INFORMATION PTA  Diet PTA: Pt interview pending. Per chart, pt was tolerating diet x 5 days since last discharge, with 1 day of N/V PTA.  Nutrition status PTA:  Nutrition Supplements PTA: none noted  Food Allergies: NKFA  Weight History PTA: Per Outpatient Clinical Summary: 158 pounds (9/16/19), 152 pounds (10/17/19), 149 pounds (11/13/19); current dosing wt 143 pounds indicates progressive 15 pound (9%) weight loss in past 2 months  Other Information:    INFORMATION THIS ADMISSION  NGT Output x 24-hours: 500ml  Last BM: none noted, no flatus  Other  Information: Per team, pt is to remain NPO until surgery on 11/26; TPN Team consulted 11/21    Recommend 1/2 dose, lipid-free TPN on day 1; monitor labs/lytes and advance as tolerated. Trend BG; monitor need for insulin in TPN. Recommend continue IVD5 1/NS with KCL 20meq/L until TPN infusion is initiated.    TPN recommendations as appropriate:   105 grams amino acids (700ml 15% a.a.)  280 grams dextrose (400ml 70% dex)  35 grams SMOFlipid (1751ml 20%IVFE @ 14.6ml/hr x 12 hours)    Total Calories: 1722 vicky/day (27cal/Kg per dosing wt 64.9Kg)  Total Protein: 105 Gm/day (1.6Gm/Kg per dosing wt 64.9Kg)  Micronutrients: 10ml MVI, 3ml MTE-5    Non-Protein Calories: 1302 vicky/day (20cal/Kg)  Dextrose Infusion Rate: 3 mg/Kg/min  Lipid Infusion Rate: 0.54 Gm/Kg/day; 0.04 Gm/Kg/hr INFORMATION PTA  Diet PTA: Pt follows a Regular diet. Per chart, pt was tolerating diet x 5 days since last discharge, with 1 day of N/V PTA.  Nutrition status PTA: Pt endorses progressive decline po intake secondary to poor appetite, dysgeusia from chemotherapy, and abdominal pain, in setting of B cell lymphoma. Per chart, pt was tolerating diet x 5 days since last discharge, with 1 day of N/V PTA.  Nutrition Supplements PTA: none noted  Food Allergies: NKFA  Weight History PTA: Per Outpatient Clinical Summary: 158 pounds (9/16/19), 152 pounds (10/17/19), 149 pounds (11/13/19). Pt reports  pounds; current dosing wt 143 pounds indicates progressive wt loss > 20 pounds (13%) in past 2 months.     INFORMATION THIS ADMISSION  NGT Output x 24-hours: 500ml  Last BM: none noted, no flatus  Other  Information: Per team, pt may need to remain NPO until surgery on 11/26; TPN Team consulted 11/21.    Recommend 1/2 dose, lipid-free TPN on day 1; monitor labs/lytes and advance as tolerated. Trend BG; monitor need for insulin in TPN. Recommend continue IVD5 1/NS with KCL 20meq/L until TPN infusion is initiated.    TPN recommendations as appropriate:   105 grams amino acids (700ml 15% a.a.)  280 grams dextrose (400ml 70% dex)  35 grams SMOFlipid (1751ml 20%IVFE @ 14.6ml/hr x 12 hours)    Total Calories: 1722 vicky/day (27cal/Kg per dosing wt 64.9Kg)  Total Protein: 105 Gm/day (1.6Gm/Kg per dosing wt 64.9Kg)  Micronutrients: 10ml MVI, 3ml MTE-5    Non-Protein Calories: 1302 vicky/day (20cal/Kg)  Dextrose Infusion Rate: 3 mg/Kg/min  Lipid Infusion Rate: 0.54 Gm/Kg/day; 0.04 Gm/Kg/hr INFORMATION PTA  Diet PTA: Pt follows a Regular diet. Per chart, pt was tolerating diet x 5 days since last discharge, with 1 day of N/V PTA.  Nutrition status PTA: Pt endorses progressive decline po intake secondary to poor appetite, dysgeusia from chemotherapy, and abdominal pain, in setting of B cell lymphoma. Per chart, pt was tolerating diet x 5 days since last discharge, with 1 day of N/V PTA.  Nutrition Supplements PTA: none noted  Food Allergies: NKFA  Weight History PTA: Per Outpatient Clinical Summary: 158 pounds (9/16/19), 152 pounds (10/17/19), 149 pounds (11/13/19). Pt reports  pounds; current dosing wt 143 pounds indicates progressive wt loss > 20 pounds (13%) in past 2 months.     INFORMATION THIS ADMISSION  NGT Output x 24-hours: 500ml  Last BM: none noted, no flatus  Other  Information: Per team, pt may need to remain NPO until surgery on 11/26; TPN Team consulted 11/21.    Recommend 1/2 dose, lipid-free TPN on day 1; monitor labs/lytes and advance as tolerated. Trend BG; monitor need for insulin in TPN. Recommend continue IVD5 1/2NS with KCL 20meq/L until TPN infusion is initiated.    TPN recommendations as appropriate:   105 grams amino acids (700ml 15% a.a.)  280 grams dextrose (400ml 70% dex)  35 grams SMOFlipid (1751ml 20%IVFE @ 14.6ml/hr x 12 hours)    Total Calories: 1722 vicky/day (27cal/Kg per dosing wt 64.9Kg)  Total Protein: 105 Gm/day (1.6Gm/Kg per dosing wt 64.9Kg)  Micronutrients: 10ml MVI, 3ml MTE-5    Non-Protein Calories: 1302 vicky/day (20cal/Kg)  Dextrose Infusion Rate: 3 mg/Kg/min  Lipid Infusion Rate: 0.54 Gm/Kg/day; 0.04 Gm/Kg/hr

## 2019-11-21 NOTE — DIETITIAN INITIAL EVALUATION ADULT. - REASON INDICATOR FOR ASSESSMENT
Nutrition Assessment warranted for TPN Team consult on 11/21.   Information obtained from: medical record, TPN Team rounds. 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 Pt was in the ER 1 week ago for a partial SBO secondary to a known jejunal mass but passed flatus and tolerated PO so was discharged. He is now NPO with NGT for SBO 2/2 jejunal mass. Nutrition Assessment warranted for TPN Team consult on 11/21.   Information obtained from: patient, family, medical record, TPN Team rounds.   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 Pt was in the ER 1 week ago for a partial SBO secondary to a known jejunal mass but passed flatus and tolerated PO so was discharged. He is now NPO with NGT for SBO 2/2 jejunal mass.

## 2019-11-22 LAB
ALBUMIN SERPL ELPH-MCNC: 3.4 G/DL — SIGNIFICANT CHANGE UP (ref 3.3–5)
ALP SERPL-CCNC: 88 U/L — SIGNIFICANT CHANGE UP (ref 40–120)
ALT FLD-CCNC: 21 U/L — SIGNIFICANT CHANGE UP (ref 10–45)
ANION GAP SERPL CALC-SCNC: 10 MMOL/L — SIGNIFICANT CHANGE UP (ref 5–17)
ANISOCYTOSIS BLD QL: SIGNIFICANT CHANGE UP
AST SERPL-CCNC: 14 U/L — SIGNIFICANT CHANGE UP (ref 10–40)
BASOPHILS # BLD AUTO: 0.05 K/UL — SIGNIFICANT CHANGE UP (ref 0–0.2)
BASOPHILS NFR BLD AUTO: 0.8 % — SIGNIFICANT CHANGE UP (ref 0–2)
BILIRUB SERPL-MCNC: 0.3 MG/DL — SIGNIFICANT CHANGE UP (ref 0.2–1.2)
BUN SERPL-MCNC: 12 MG/DL — SIGNIFICANT CHANGE UP (ref 7–23)
CA-I BLD-SCNC: 1.17 MMOL/L — SIGNIFICANT CHANGE UP (ref 1.12–1.3)
CALCIUM SERPL-MCNC: 8.7 MG/DL — SIGNIFICANT CHANGE UP (ref 8.4–10.5)
CHLORIDE SERPL-SCNC: 99 MMOL/L — SIGNIFICANT CHANGE UP (ref 96–108)
CO2 SERPL-SCNC: 29 MMOL/L — SIGNIFICANT CHANGE UP (ref 22–31)
CREAT SERPL-MCNC: 0.87 MG/DL — SIGNIFICANT CHANGE UP (ref 0.5–1.3)
DACRYOCYTES BLD QL SMEAR: SLIGHT — SIGNIFICANT CHANGE UP
ELLIPTOCYTES BLD QL SMEAR: SLIGHT — SIGNIFICANT CHANGE UP
EOSINOPHIL # BLD AUTO: 0 K/UL — SIGNIFICANT CHANGE UP (ref 0–0.5)
EOSINOPHIL NFR BLD AUTO: 0 % — SIGNIFICANT CHANGE UP (ref 0–6)
GLUCOSE BLDC GLUCOMTR-MCNC: 108 MG/DL — HIGH (ref 70–99)
GLUCOSE BLDC GLUCOMTR-MCNC: 122 MG/DL — HIGH (ref 70–99)
GLUCOSE SERPL-MCNC: 130 MG/DL — HIGH (ref 70–99)
HBA1C BLD-MCNC: 5.6 % — SIGNIFICANT CHANGE UP (ref 4–5.6)
HCT VFR BLD CALC: 27.6 % — LOW (ref 39–50)
HGB BLD-MCNC: 8.7 G/DL — LOW (ref 13–17)
HYPOCHROMIA BLD QL: SLIGHT — SIGNIFICANT CHANGE UP
LYMPHOCYTES # BLD AUTO: 0.28 K/UL — LOW (ref 1–3.3)
LYMPHOCYTES # BLD AUTO: 4.2 % — LOW (ref 13–44)
MACROCYTES BLD QL: SLIGHT — SIGNIFICANT CHANGE UP
MAGNESIUM SERPL-MCNC: 2.2 MG/DL — SIGNIFICANT CHANGE UP (ref 1.6–2.6)
MANUAL SMEAR VERIFICATION: SIGNIFICANT CHANGE UP
MCHC RBC-ENTMCNC: 25.6 PG — LOW (ref 27–34)
MCHC RBC-ENTMCNC: 31.5 GM/DL — LOW (ref 32–36)
MCV RBC AUTO: 81.2 FL — SIGNIFICANT CHANGE UP (ref 80–100)
MICROCYTES BLD QL: SIGNIFICANT CHANGE UP
MONOCYTES # BLD AUTO: 0.88 K/UL — SIGNIFICANT CHANGE UP (ref 0–0.9)
MONOCYTES NFR BLD AUTO: 13.2 % — SIGNIFICANT CHANGE UP (ref 2–14)
NEUTROPHILS # BLD AUTO: 5.48 K/UL — SIGNIFICANT CHANGE UP (ref 1.8–7.4)
NEUTROPHILS NFR BLD AUTO: 81.8 % — HIGH (ref 43–77)
PHOSPHATE SERPL-MCNC: 3.6 MG/DL — SIGNIFICANT CHANGE UP (ref 2.5–4.5)
PLAT MORPH BLD: NORMAL — SIGNIFICANT CHANGE UP
PLATELET # BLD AUTO: 254 K/UL — SIGNIFICANT CHANGE UP (ref 150–400)
POIKILOCYTOSIS BLD QL AUTO: SLIGHT — SIGNIFICANT CHANGE UP
POLYCHROMASIA BLD QL SMEAR: SLIGHT — SIGNIFICANT CHANGE UP
POTASSIUM SERPL-MCNC: 3.9 MMOL/L — SIGNIFICANT CHANGE UP (ref 3.5–5.3)
POTASSIUM SERPL-SCNC: 3.9 MMOL/L — SIGNIFICANT CHANGE UP (ref 3.5–5.3)
PREALB SERPL-MCNC: 21 MG/DL — SIGNIFICANT CHANGE UP (ref 20–40)
PROT SERPL-MCNC: 5.3 G/DL — LOW (ref 6–8.3)
RBC # BLD: 3.4 M/UL — LOW (ref 4.2–5.8)
RBC # FLD: 23.9 % — HIGH (ref 10.3–14.5)
RBC BLD AUTO: ABNORMAL
SODIUM SERPL-SCNC: 138 MMOL/L — SIGNIFICANT CHANGE UP (ref 135–145)
TRIGL SERPL-MCNC: 139 MG/DL — SIGNIFICANT CHANGE UP (ref 10–149)
WBC # BLD: 6.7 K/UL — SIGNIFICANT CHANGE UP (ref 3.8–10.5)
WBC # FLD AUTO: 6.7 K/UL — SIGNIFICANT CHANGE UP (ref 3.8–10.5)

## 2019-11-22 PROCEDURE — 99232 SBSQ HOSP IP/OBS MODERATE 35: CPT

## 2019-11-22 PROCEDURE — 99232 SBSQ HOSP IP/OBS MODERATE 35: CPT | Mod: GC

## 2019-11-22 RX ORDER — ELECTROLYTE SOLUTION,INJ
1 VIAL (ML) INTRAVENOUS
Refills: 0 | Status: DISCONTINUED | OUTPATIENT
Start: 2019-11-22 | End: 2019-11-22

## 2019-11-22 RX ORDER — I.V. FAT EMULSION 20 G/100ML
8.3 EMULSION INTRAVENOUS
Qty: 20 | Refills: 0 | Status: DISCONTINUED | OUTPATIENT
Start: 2019-11-22 | End: 2019-11-23

## 2019-11-22 RX ADMIN — Medication 5 MILLIGRAM(S): at 12:40

## 2019-11-22 RX ADMIN — CHLORHEXIDINE GLUCONATE 1 APPLICATION(S): 213 SOLUTION TOPICAL at 12:45

## 2019-11-22 RX ADMIN — Medication 5 MILLIGRAM(S): at 06:19

## 2019-11-22 RX ADMIN — Medication 5 MILLIGRAM(S): at 17:43

## 2019-11-22 RX ADMIN — CHLORHEXIDINE GLUCONATE 1 APPLICATION(S): 213 SOLUTION TOPICAL at 06:17

## 2019-11-22 RX ADMIN — Medication 50 MICROGRAM(S): at 21:39

## 2019-11-22 RX ADMIN — PANTOPRAZOLE SODIUM 40 MILLIGRAM(S): 20 TABLET, DELAYED RELEASE ORAL at 12:40

## 2019-11-22 RX ADMIN — ENOXAPARIN SODIUM 40 MILLIGRAM(S): 100 INJECTION SUBCUTANEOUS at 12:04

## 2019-11-22 NOTE — PROGRESS NOTE ADULT - ASSESSMENT
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 Pt was in the ER 1 week ago for a partial SBO secondary to a known jejunal mass. While in the ER he passed flatus and tolerated PO so he was discharged. He reports tolerating a diet without any issues for the past 5 days. He's coming back in today with 3 episodes of NBNB emesis overnight.  rom:     CT Abdomen and Pelvis w/ IV Cont (11.20.19 @ 06:46) >    	Small bowel obstruction with transition point at the level of the jejunal   	mass as seen on prior CT with interval increase in dilation of the   	involved bowel loops.     	    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      #Anemia; no e/o actve bld loss  monitor, partly dilutional  fu hemeonc    #HTN: cont metoprolol iv    #hypothyroidism: synthroid    GI ppx  DVT ppx    Will cont to follow  [facility]  [phone]

## 2019-11-22 NOTE — CONSULT NOTE ADULT - SUBJECTIVE AND OBJECTIVE BOX
Hematology/Oncology Follow-up    INTERVAL HPI/OVERNIGHT EVENTS:  NG tube was removed this morning. Feeling fine. Had passing gas. No BM yet. Denies abdominal pain, nausea/vomiting.     REVIEW OF SYSTEMS:  General: Negative for weakness, fatigue, malaise, chills, fever, night sweats, unintentional weight change.  Head: Negative for HA  Eyes: Negative for vision changes, diplopia, impaired vision.  ENT: Negative for tinnitus, vertigo, hearing impairment, postnasal drip, sore throat.  Respiratory: Negative for shortness of breath, cough, sputum.  Cardiovascular: Negative for chest pain, dyspnea on exertion, palpations, edema.  Gastrointestinal: Negative for dysphagia, nausea, vomiting, hematemesis, abdominal pain, diarrhea, constipation, hematochezia, tarry stool.  MSK: Negative for myalgia, joint pain, neck stiffness, weakness, back pain.  Neuro: Negative for weakness, loss of balance.   Skin: Negative for rash, erythema.  Psych: Negative for anxiety, depression, sleep disturbance.    VITAL SIGNS:  T(F): 97.6 (11-22-19 @ 09:15)  HR: 65 (11-22-19 @ 09:15)  BP: 122/79 (11-22-19 @ 09:15)  RR: 18 (11-22-19 @ 09:15)  SpO2: 96% (11-22-19 @ 09:15)  Wt(kg): --    11-21-19 @ 07:01  -  11-22-19 @ 07:00  --------------------------------------------------------  IN: 2196 mL / OUT: 2200 mL / NET: -4 mL        PHYSICAL EXAM:  Constitutional: NAD   Eyes: PERRLA, EOMI, sclera non-icteric, conjunctiva non-anemia  Neck: supple, no masses, no elevated JVP  Mouth: No mucositis, no exudate, no ulcer.   Respiratory: CTA b/l  Cardiovascular: Normal rate regular rhythm. normal S1S2, no murmur  Gastrointestinal: soft and flat, no tenderness to palpation, hypoactive bowel sound  Extremities:  PICC line in left arm without erythema, tenderness, or discharge   MSK: No joint swelling, erythema, or tenderness   Neurological: AOx3, Cranial nerves II-XII grossly intact  Skin: No rash  Psych: Normal affect    MEDICATIONS  (STANDING):  chlorhexidine 2% Cloths 1 Application(s) Topical daily  chlorhexidine 4% Liquid 1 Application(s) Topical <User Schedule>  enoxaparin Injectable 40 milliGRAM(s) SubCutaneous daily  fat emulsion (Fish Oil and Plant Based) 20% Infusion 8.3 mL/Hr (8.3 mL/Hr) IV Continuous <Continuous>  influenza   Vaccine 0.5 milliLiter(s) IntraMuscular once  levothyroxine Injectable 50 MICROGram(s) IV Push at bedtime  metoprolol tartrate Injectable 5 milliGRAM(s) IV Push every 6 hours  pantoprazole  Injectable 40 milliGRAM(s) IV Push daily  Parenteral Nutrition - Adult 1 Each (83 mL/Hr) TPN Continuous <Continuous>  Parenteral Nutrition - Adult 1 Each (83 mL/Hr) TPN Continuous <Continuous>    MEDICATIONS  (PRN):  benzocaine 15 mG/menthol 3.6 mG Lozenge 1 Lozenge Oral three times a day PRN Sore Throat  sodium chloride 0.9% lock flush 10 milliLiter(s) IV Push every 1 hour PRN Pre/post blood products, medications, blood draw, and to maintain line patency  tetracaine/benzocaine/butamben Spray 1 Spray(s) Topical every 4 hours PRN sore throat      No Known Allergies      LABS:                        8.7    6.70  )-----------( 254      ( 22 Nov 2019 06:57 )             27.6     11-22    138  |  99  |  12  ----------------------------<  130<H>  3.9   |  29  |  0.87    Ca    8.7      22 Nov 2019 05:17  Phos  3.6     11-22  Mg     2.2     11-22    TPro  5.3<L>  /  Alb  3.4  /  TBili  0.3  /  DBili  x   /  AST  14  /  ALT  21  /  AlkPhos  88  11-22       RADIOLOGY & ADDITIONAL TESTS:  No new imaging study overnight

## 2019-11-22 NOTE — PROGRESS NOTE ADULT - SUBJECTIVE AND OBJECTIVE BOX
[facility] NUTRITION SUPPORT / TPN -- FOLLOW UP NOTE  --------------------------------------------------------------------------------    24 hour events/subjective:  Pt happier now that NGT is out  NO N/V/D  tolerating TPN- no complaints  some gas no BM  No cp/palp/sob/dyspnea  no f/c/s  ambulating well w/o assist  Plan for OR tue 11/26      STANDING INPATIENT MEDICATIONS    chlorhexidine 2% Cloths 1 Application(s) Topical daily  chlorhexidine 4% Liquid 1 Application(s) Topical <User Schedule>  enoxaparin Injectable 40 milliGRAM(s) SubCutaneous daily  fat emulsion (Fish Oil and Plant Based) 20% Infusion 8.3 mL/Hr IV Continuous <Continuous>  influenza   Vaccine 0.5 milliLiter(s) IntraMuscular once  levothyroxine Injectable 50 MICROGram(s) IV Push at bedtime  metoprolol tartrate Injectable 5 milliGRAM(s) IV Push every 6 hours  pantoprazole  Injectable 40 milliGRAM(s) IV Push daily  Parenteral Nutrition - Adult 1 Each TPN Continuous <Continuous>  Parenteral Nutrition - Adult 1 Each TPN Continuous <Continuous>      PRN INPATIENT MEDICATION  benzocaine 15 mG/menthol 3.6 mG Lozenge 1 Lozenge Oral three times a day PRN  sodium chloride 0.9% lock flush 10 milliLiter(s) IV Push every 1 hour PRN  tetracaine/benzocaine/butamben Spray 1 Spray(s) Topical every 4 hours PRN        VITALS/PHYSICAL EXAM  --------------------------------------------------------------------------------  T(C): 36.7 (11-22-19 @ 13:52), Max: 37.1 (11-21-19 @ 17:13)  HR: 63 (11-22-19 @ 13:52) (60 - 69)  BP: 133/72 (11-22-19 @ 13:52) (122/79 - 166/83)  RR: 18 (11-22-19 @ 13:52) (18 - 18)  SpO2: 97% (11-22-19 @ 13:52) (96% - 98%)  Wt(kg): --  Height (cm): 162.6 (11-20-19 @ 19:02)  Weight (kg): 64.9 (11-20-19 @ 19:02)  BMI (kg/m2): 24.5 (11-20-19 @ 19:02)  BSA (m2): 1.7 (11-20-19 @ 19:02)      11-21-19 @ 07:01  -  11-22-19 @ 07:00  --------------------------------------------------------  IN: 2196 mL / OUT: 2200 mL / NET: -4 mL    11-22-19 @ 07:01  -  11-22-19 @ 15:02  --------------------------------------------------------  IN: 0 mL / OUT: 750 mL / NET: -750 mL    PHYSICAL EXAM  --------------------------------------------------------------------------------  	Gen: NAD, A&Ox3. WN/WD/WG  	HEENT: NC/AT. PERRL, supple neck, clear oropharynx, mucosa moist  	GI: No BS, soft, nontender/nondistended              MSK; FROMx4, no deformities              Vascular: Warm, no clubbing, cyanosis, nor edema                   LUE PICC dressing c/d/i  	Neuro: No focal deficits, sensation & strength grossly intact  	Psych: Normal affect and mood  	Skin: Warm, without rashes, good turgor  	        LABS/ CULTURES/ RADIOLOGY:              8.7    6.70  >-----------<  254      [11-22-19 @ 06:57]              27.6     138  |  99  |  12  ----------------------------<  130      [11-22-19 @ 05:17]  3.9   |  29  |  0.87        Ca     8.7     [11-22-19 @ 05:17]      iCa    1.17     [11-22 @ 05:19]      Mg     2.2     [11-22-19 @ 05:17]      Phos  3.6     [11-22-19 @ 05:17]    TPro  5.3  /  Alb  3.4  /  TBili  0.3  /  DBili  x   /  AST  14  /  ALT  21  /  AlkPhos  88  [11-22-19 @ 05:17]    CAPILLARY BLOOD GLUCOSE  POCT Blood Glucose.: 122 mg/dL (22 Nov 2019 06:10)  POCT Blood Glucose.: 87 mg/dL (21 Nov 2019 20:06)    Prealbumin, Serum: 21 mg/dL (11-22-19 @ 08:19)    Triglycerides, Serum: 139 mg/dL (11.22.19 @ 05:17)  Triglycerides, Serum: 189 mg/dL (11.21.19 @ 12:32)    < from: Xray Chest 1 View- PORTABLE-Urgent (11.21.19 @ 16:44) >  FINDINGS:  Right chest wall Mediport and left PICC line terminate in the SVC. An   enteric tube terminates in the stomach.  The lungs are clear.  There are no pleural effusions or pneumothorax.  The cardiomediastinal silhouetteis within normal limits.    IMPRESSION:   Clear lungs.

## 2019-11-22 NOTE — CONSULT NOTE ADULT - ASSESSMENT
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    # SBO 2/2 jejunal mass  Improving slowly. THis is the second SBO and Dr. Cunha plans to perform surgical intervention, scheduled tentatively on 11/26.    - TPN per surge onc team  - Surgery tentatively scheduled on 11/26  - Appreciate surg onc's care    # GCB type DLBCL  s/p 2 cycles RCHOP. Holding 3rd cycle (it was due 11/21). Dr. Kingston is aware  - F/U with Dr. Kingston outpatient

## 2019-11-22 NOTE — PROGRESS NOTE ADULT - ASSESSMENT
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      Pt remains NPO w/ NGT;  TPN consulted for Pt w/ Protein-Calorie Malnutrition    TPN tolerated at 1/2strength- advance to GOAL AA & Dextrose & SMOF 20g    TPN recommendations as appropriate:   105 grams amino acids (700ml 15% a.a.)  280 grams dextrose (400ml 70% dex)  35 grams SMOFlipid (1751ml 20%IVFE @ 14.6ml/hr x 12 hours)    Total Calories: 1722 vicky/day (27cal/Kg per dosing wt 64.9Kg)  Total Protein: 105 Gm/day (1.6Gm/Kg per dosing wt 64.9Kg)  Micronutrients: 10ml MVI, 3ml MTE-5    Non-Protein Calories: 1302 vicky/day (20cal/Kg)  Dextrose Infusion Rate: 3 mg/Kg/min  Lipid Infusion Rate: 0.54 Gm/Kg/day; 0.04 Gm/Kg/hr      1. Dedicated Central line for TPN maintanence as per protocol  2. Strict Intake and Output.  3. Weights three times a week  4. Monitor BMP, Mg, Ionized Ca, Phosphorus daily  5. Check Triglycerides daily for first 3 days of TPN, then monthly   6. Pre-albumin weekly.  7. Fingersticks to monitor glucose every 6 hours until stable then may be decreased to twice a day,           coverage with ISS - to be ordered by primary team           Elevated serum glucose- HgA1c w/ am labs noted continue to monitor  8. Volume depletion - improving -Initiate TPN w/           NaCl 40mEq, NaAce 60mEq, NaPhos 30mMol, KCl 80mEq           HyperCa resolved w/ hydration - 10mEq Ca and 12mEqMg  9. Hem/ Onc appreciated  Continue as per Surgery, will follow with you, D/w primary team    Kendy Duque PA-C  TPN team, pager [phone]  D/w Corey Montes De Oca

## 2019-11-22 NOTE — PROGRESS NOTE ADULT - ASSESSMENT
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 Pt was in the ER 1 week ago for a partial SBO secondary to a known jejunal mass but passed flatus and tolerated PO so was d/c'd. He is now NPO with NGT for SBO 2/2 jejunal mass.     Plan:  - TPN  -continue NGT/NPO/IVf  - could d/c NGT if flatus/BM  - will need operation this admission, likely Tuesday  - Regular preop  - f/u hematology plan regarding B cell lymphoma and current chemotherapy    Red Team Surgery  [phone]

## 2019-11-22 NOTE — PROGRESS NOTE ADULT - ASSESSMENT
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    # SBO 2/2 jejunal mass  Improving slowly. THis is the second SBO and Dr. Cunha plans to perform surgical intervention, scheduled tentatively on 11/26.    - TPN per surge onc team  - Surgery tentatively scheduled on 11/26  - Appreciate surg onc's care    # GCB type DLBCL  s/p 2 cycles RCHOP. Holding 3rd cycle (it was due 11/21). Dr. Kingston is aware  - F/U with Dr. 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    # SBO 2/2 jejunal mass  Improving slowly. THis is the second SBO and Dr. Cunha plans to perform surgical intervention, scheduled tentatively on 11/26.    - TPN per surge onc team  - Surgery tentatively scheduled on 11/26  - Appreciate surg onc's care    # GCB type DLBCL  s/p 2 cycles RCHOP. Holding 3rd cycle (it was due 11/21). Dr. Kingston is aware  - F/U with Dr. Kingston outpatient    The case was discussed with Dr. Kostas Goetz MD, MPH  Hematology/Oncology Fellow  Pager: [phone]

## 2019-11-22 NOTE — PROGRESS NOTE ADULT - SUBJECTIVE AND OBJECTIVE BOX
Patient is a 69y old  Male who presents with a chief complaint of SBO (21 Nov 2019 15:45)      INTERVAL HPI/OVERNIGHT EVENTS: feels well, NGT dced this am  denies any abd pain/n/v      Vital Signs Last 24 Hrs  T(C): 36.4 (22 Nov 2019 09:15), Max: 37.1 (21 Nov 2019 17:13)  T(F): 97.6 (22 Nov 2019 09:15), Max: 98.8 (21 Nov 2019 17:13)  HR: 65 (22 Nov 2019 09:15) (60 - 69)  BP: 122/79 (22 Nov 2019 09:15) (122/79 - 166/83)  BP(mean): --  RR: 18 (22 Nov 2019 09:15) (18 - 18)  SpO2: 96% (22 Nov 2019 09:15) (94% - 98%)    benzocaine 15 mG/menthol 3.6 mG Lozenge 1 Lozenge Oral three times a day PRN  chlorhexidine 2% Cloths 1 Application(s) Topical daily  chlorhexidine 4% Liquid 1 Application(s) Topical <User Schedule>  enoxaparin Injectable 40 milliGRAM(s) SubCutaneous daily  fat emulsion (Fish Oil and Plant Based) 20% Infusion 8.3 mL/Hr IV Continuous <Continuous>  influenza   Vaccine 0.5 milliLiter(s) IntraMuscular once  levothyroxine Injectable 50 MICROGram(s) IV Push at bedtime  metoprolol tartrate Injectable 5 milliGRAM(s) IV Push every 6 hours  pantoprazole  Injectable 40 milliGRAM(s) IV Push daily  Parenteral Nutrition - Adult 1 Each TPN Continuous <Continuous>  Parenteral Nutrition - Adult 1 Each TPN Continuous <Continuous>  sodium chloride 0.9% lock flush 10 milliLiter(s) IV Push every 1 hour PRN  tetracaine/benzocaine/butamben Spray 1 Spray(s) Topical every 4 hours PRN      PHYSICAL EXAM:  GENERAL: NAD,   EYES: conjunctiva and sclera clear  ENMT: Moist mucous membranes  NECK: Supple, No JVD, Normal thyroid  NERVOUS SYSTEM:  Alert & Oriented X,   CHEST/LUNG: Clear to auscultation bilaterally; No rales, rhonchi, wheezing, or rubs  HEART: Regular rate and rhythm; No murmurs, rubs, or gallops  ABDOMEN: Soft, Nontender, Nondistended; Bowel sounds present  EXTREMITIES:  2+ Peripheral Pulses, No clubbing, cyanosis, or edema  LYMPH: No lymphadenopathy noted  SKIN: No rashes or lesions    Consultant(s) Notes Reviewed:  [x ] YES  [ ] NO  Care Discussed with Consultants/Other Providers [ x] YES  [ ] NO    LABS:                        8.7    6.70  )-----------( 254      ( 22 Nov 2019 06:57 )             27.6     11-22    138  |  99  |  12  ----------------------------<  130<H>  3.9   |  29  |  0.87    Ca    8.7      22 Nov 2019 05:17  Phos  3.6     11-22  Mg     2.2     11-22    TPro  5.3<L>  /  Alb  3.4  /  TBili  0.3  /  DBili  x   /  AST  14  /  ALT  21  /  AlkPhos  88  11-22        CAPILLARY BLOOD GLUCOSE      POCT Blood Glucose.: 122 mg/dL (22 Nov 2019 06:10)  POCT Blood Glucose.: 87 mg/dL (21 Nov 2019 20:06)              RADIOLOGY & ADDITIONAL TESTS:    Imaging Personally Reviewed:  [x ] YES  [ ] NO

## 2019-11-22 NOTE — PROGRESS NOTE ADULT - SUBJECTIVE AND OBJECTIVE BOX
No acute events overnight    SUBJECTIVE:  Reports feeling better  Passing gas   Ambulating independently  Counselled surgery OOB diet  Nonsmoker    OBJECTIVE:  Vital Signs Last 24 Hrs  T(C): 36.4 (22 Nov 2019 09:15), Max: 37.1 (21 Nov 2019 17:13)  T(F): 97.6 (22 Nov 2019 09:15), Max: 98.8 (21 Nov 2019 17:13)  HR: 65 (22 Nov 2019 09:15) (60 - 69)  BP: 122/79 (22 Nov 2019 09:15) (122/79 - 166/83)  RR: 18 (22 Nov 2019 09:15) (18 - 18)  SpO2: 96% (22 Nov 2019 09:15) (94% - 98%)    Physical Examination:  GEN: NAD, resting quietly  NEURO: AAOx3, CN II-XII grossly intact, no focal deficits  PULM: symmetric chest rise bilaterally, no increased WOB  ABD: soft, nontender, nondistended. NGT in place  EXTR: no cyanosis or edema, moving all extremities    LABS:                        8.7    6.70  )-----------( 254      ( 22 Nov 2019 06:57 )             27.6     11-22    138  |  99  |  12  ----------------------------<  130<H>  3.9   |  29  |  0.87    Ca    8.7      22 Nov 2019 05:17  Phos  3.6     11-22  Mg     2.2     11-22    TPro  5.3<L>  /  Alb  3.4  /  TBili  0.3  /  DBili  x   /  AST  14  /  ALT  21  /  AlkPhos  88  11-22

## 2019-11-22 NOTE — CHART NOTE - NSCHARTNOTEFT_GEN_A_CORE
Brief Note. Malnutrition/TPN follow up.    Chart reviewed, events noted. 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 Half-dose, lipid-free TPN initiated 11/21. NGT removed this morning. Pt reports no discomfort. Plan for OR 11/26.    Diet : NPO    Parenteral Nutrition (11/21): TPN infusing at 83ml/hr (60Gm amino acids, 140Gm dextrose, 0m SMOF lipids) to provide 716cal/day; with 10ml MVI, 3ml MTE-5.      TPN goal:  105 grams amino acids (700ml 15% a.a.)  280 grams dextrose (400ml 70% dex)  35 grams SMOFlipid (1751ml 20%IVFE @ 14.6ml/hr x 12 hours)    Total Calories: 1722 vicky/day (27cal/Kg per dosing wt 64.9Kg)  Total Protein: 105 Gm/day (1.6Gm/Kg per dosing wt 64.9Kg)  Micronutrients: 10ml MVI, 3ml MTE-5    Non-Protein Calories: 1302 vicky/day (20cal/Kg)  Dextrose Infusion Rate: 3 mg/Kg/min  Lipid Infusion Rate: 0.54 Gm/Kg/day; 0.04 Gm/Kg/hr    MEDS: reviewed  LABS:   11-22 @ 08:19:  Prealbumin 21,   11-22 @ 06:57 HbA1c 5.6,  Hemoglobin 8.7<L>, Hematocrit 27.6<L>  11-22 @ 05:17: Sodium 138, Potassium 3.9, Chloride 99, Calcium 8.7, Magnesium 2.2, Phosphorus 3.6, BUN 12, Creatinine 0.87, <H>, Alk Phos 88, ALT/SGPT 21, AST/SGOT 14, Total Protein 5.3<L>, Albumin 3.4, Total Bilirubin 0.3,     Triglycerides, Serum: 139 mg/dL (11-22-19 @ 05:17)  Triglycerides, Serum: 189 mg/dL (11-21-19 @ 12:32)    POCT Blood Glucose.: 122 mg/dL (22 Nov 2019 06:10)  POCT Blood Glucose.: 87 mg/dL (21 Nov 2019 20:06)       Interventions: Advance TPN to goal, per TPN Team/Nutrition assessement      RD remains available upon request and will follow up per protocol    Marita Ponce MS RD CDN [facility], Pager # [phone]

## 2019-11-23 LAB
ALBUMIN SERPL ELPH-MCNC: 3.6 G/DL — SIGNIFICANT CHANGE UP (ref 3.3–5)
ALP SERPL-CCNC: 85 U/L — SIGNIFICANT CHANGE UP (ref 40–120)
ALT FLD-CCNC: 22 U/L — SIGNIFICANT CHANGE UP (ref 10–45)
ANION GAP SERPL CALC-SCNC: 10 MMOL/L — SIGNIFICANT CHANGE UP (ref 5–17)
ANISOCYTOSIS BLD QL: SLIGHT — SIGNIFICANT CHANGE UP
AST SERPL-CCNC: 18 U/L — SIGNIFICANT CHANGE UP (ref 10–40)
BASOPHILS # BLD AUTO: 0.09 K/UL — SIGNIFICANT CHANGE UP (ref 0–0.2)
BASOPHILS NFR BLD AUTO: 1.7 % — SIGNIFICANT CHANGE UP (ref 0–2)
BILIRUB SERPL-MCNC: 0.2 MG/DL — SIGNIFICANT CHANGE UP (ref 0.2–1.2)
BUN SERPL-MCNC: 14 MG/DL — SIGNIFICANT CHANGE UP (ref 7–23)
CA-I BLD-SCNC: 1.18 MMOL/L — SIGNIFICANT CHANGE UP (ref 1.12–1.3)
CALCIUM SERPL-MCNC: 9 MG/DL — SIGNIFICANT CHANGE UP (ref 8.4–10.5)
CHLORIDE SERPL-SCNC: 103 MMOL/L — SIGNIFICANT CHANGE UP (ref 96–108)
CO2 SERPL-SCNC: 27 MMOL/L — SIGNIFICANT CHANGE UP (ref 22–31)
CREAT SERPL-MCNC: 0.84 MG/DL — SIGNIFICANT CHANGE UP (ref 0.5–1.3)
ELLIPTOCYTES BLD QL SMEAR: SLIGHT — SIGNIFICANT CHANGE UP
EOSINOPHIL # BLD AUTO: 0 K/UL — SIGNIFICANT CHANGE UP (ref 0–0.5)
EOSINOPHIL NFR BLD AUTO: 0 % — SIGNIFICANT CHANGE UP (ref 0–6)
GLUCOSE BLDC GLUCOMTR-MCNC: 120 MG/DL — HIGH (ref 70–99)
GLUCOSE BLDC GLUCOMTR-MCNC: 123 MG/DL — HIGH (ref 70–99)
GLUCOSE BLDC GLUCOMTR-MCNC: 88 MG/DL — SIGNIFICANT CHANGE UP (ref 70–99)
GLUCOSE SERPL-MCNC: 92 MG/DL — SIGNIFICANT CHANGE UP (ref 70–99)
HCT VFR BLD CALC: 29.7 % — LOW (ref 39–50)
HGB BLD-MCNC: 9.2 G/DL — LOW (ref 13–17)
LYMPHOCYTES # BLD AUTO: 0.28 K/UL — LOW (ref 1–3.3)
LYMPHOCYTES # BLD AUTO: 5.2 % — LOW (ref 13–44)
MACROCYTES BLD QL: SLIGHT — SIGNIFICANT CHANGE UP
MAGNESIUM SERPL-MCNC: 2.4 MG/DL — SIGNIFICANT CHANGE UP (ref 1.6–2.6)
MANUAL SMEAR VERIFICATION: SIGNIFICANT CHANGE UP
MCHC RBC-ENTMCNC: 25.5 PG — LOW (ref 27–34)
MCHC RBC-ENTMCNC: 31 GM/DL — LOW (ref 32–36)
MCV RBC AUTO: 82.3 FL — SIGNIFICANT CHANGE UP (ref 80–100)
MONOCYTES # BLD AUTO: 0.46 K/UL — SIGNIFICANT CHANGE UP (ref 0–0.9)
MONOCYTES NFR BLD AUTO: 8.7 % — SIGNIFICANT CHANGE UP (ref 2–14)
NEUTROPHILS # BLD AUTO: 4.5 K/UL — SIGNIFICANT CHANGE UP (ref 1.8–7.4)
NEUTROPHILS NFR BLD AUTO: 84.4 % — HIGH (ref 43–77)
PHOSPHATE SERPL-MCNC: 3.9 MG/DL — SIGNIFICANT CHANGE UP (ref 2.5–4.5)
PLAT MORPH BLD: NORMAL — SIGNIFICANT CHANGE UP
PLATELET # BLD AUTO: 252 K/UL — SIGNIFICANT CHANGE UP (ref 150–400)
POIKILOCYTOSIS BLD QL AUTO: SLIGHT — SIGNIFICANT CHANGE UP
POLYCHROMASIA BLD QL SMEAR: SLIGHT — SIGNIFICANT CHANGE UP
POTASSIUM SERPL-MCNC: 4.3 MMOL/L — SIGNIFICANT CHANGE UP (ref 3.5–5.3)
POTASSIUM SERPL-SCNC: 4.3 MMOL/L — SIGNIFICANT CHANGE UP (ref 3.5–5.3)
PROT SERPL-MCNC: 5.8 G/DL — LOW (ref 6–8.3)
RBC # BLD: 3.61 M/UL — LOW (ref 4.2–5.8)
RBC # FLD: 24 % — HIGH (ref 10.3–14.5)
RBC BLD AUTO: ABNORMAL
SODIUM SERPL-SCNC: 140 MMOL/L — SIGNIFICANT CHANGE UP (ref 135–145)
TRIGL SERPL-MCNC: 153 MG/DL — HIGH (ref 10–149)
WBC # BLD: 5.33 K/UL — SIGNIFICANT CHANGE UP (ref 3.8–10.5)
WBC # FLD AUTO: 5.33 K/UL — SIGNIFICANT CHANGE UP (ref 3.8–10.5)

## 2019-11-23 RX ORDER — ALLOPURINOL 300 MG
100 TABLET ORAL DAILY
Refills: 0 | Status: DISCONTINUED | OUTPATIENT
Start: 2019-11-23 | End: 2019-11-26

## 2019-11-23 RX ORDER — IBUPROFEN 200 MG
400 TABLET ORAL EVERY 6 HOURS
Refills: 0 | Status: DISCONTINUED | OUTPATIENT
Start: 2019-11-23 | End: 2019-11-23

## 2019-11-23 RX ORDER — I.V. FAT EMULSION 20 G/100ML
8.3 EMULSION INTRAVENOUS
Qty: 20 | Refills: 0 | Status: DISCONTINUED | OUTPATIENT
Start: 2019-11-23 | End: 2019-11-23

## 2019-11-23 RX ORDER — IBUPROFEN 200 MG
400 TABLET ORAL EVERY 6 HOURS
Refills: 0 | Status: DISCONTINUED | OUTPATIENT
Start: 2019-11-23 | End: 2019-11-24

## 2019-11-23 RX ORDER — ELECTROLYTE SOLUTION,INJ
1 VIAL (ML) INTRAVENOUS
Refills: 0 | Status: DISCONTINUED | OUTPATIENT
Start: 2019-11-23 | End: 2019-11-23

## 2019-11-23 RX ADMIN — Medication 5 MILLIGRAM(S): at 00:24

## 2019-11-23 RX ADMIN — Medication 1 EACH: at 17:09

## 2019-11-23 RX ADMIN — Medication 5 MILLIGRAM(S): at 05:21

## 2019-11-23 RX ADMIN — Medication 400 MILLIGRAM(S): at 16:25

## 2019-11-23 RX ADMIN — CHLORHEXIDINE GLUCONATE 1 APPLICATION(S): 213 SOLUTION TOPICAL at 05:22

## 2019-11-23 RX ADMIN — Medication 50 MICROGRAM(S): at 21:22

## 2019-11-23 RX ADMIN — I.V. FAT EMULSION 8.3 ML/HR: 20 EMULSION INTRAVENOUS at 17:09

## 2019-11-23 RX ADMIN — ENOXAPARIN SODIUM 40 MILLIGRAM(S): 100 INJECTION SUBCUTANEOUS at 12:17

## 2019-11-23 RX ADMIN — Medication 5 MILLIGRAM(S): at 18:01

## 2019-11-23 RX ADMIN — PANTOPRAZOLE SODIUM 40 MILLIGRAM(S): 20 TABLET, DELAYED RELEASE ORAL at 12:17

## 2019-11-23 RX ADMIN — Medication 5 MILLIGRAM(S): at 12:17

## 2019-11-23 RX ADMIN — Medication 100 MILLIGRAM(S): at 10:18

## 2019-11-23 NOTE — PROGRESS NOTE ADULT - SUBJECTIVE AND OBJECTIVE BOX
[facility] [place] NUTRITION SUPPORT / TPN -- FOLLOW UP NOTE  --------------------------------------------------------------------------------    24 hour events/subjective:  No acute overnight events.  Advanced to clears this AM, has not had anything to drink yet.  +flatus, no BM.      Diet:  Diet, Clear Liquid (11-23-19 @ 08:49)      PAST HISTORY  --------------------------------------------------------------------------------  No significant changes to PMH, PSH, FHx, SHx, unless otherwise noted    ALLERGIES & MEDICATIONS  --------------------------------------------------------------------------------  Allergies    No Known Allergies    Intolerances      Standing Inpatient Medications  allopurinol 100 milliGRAM(s) Oral daily  chlorhexidine 2% Cloths 1 Application(s) Topical daily  chlorhexidine 4% Liquid 1 Application(s) Topical <User Schedule>  enoxaparin Injectable 40 milliGRAM(s) SubCutaneous daily  fat emulsion (Fish Oil and Plant Based) 20% Infusion 8.3 mL/Hr IV Continuous <Continuous>  influenza   Vaccine 0.5 milliLiter(s) IntraMuscular once  levothyroxine Injectable 50 MICROGram(s) IV Push at bedtime  metoprolol tartrate Injectable 5 milliGRAM(s) IV Push every 6 hours  pantoprazole  Injectable 40 milliGRAM(s) IV Push daily  Parenteral Nutrition - Adult 1 Each TPN Continuous <Continuous>    PRN Inpatient Medications  benzocaine 15 mG/menthol 3.6 mG Lozenge 1 Lozenge Oral three times a day PRN  sodium chloride 0.9% lock flush 10 milliLiter(s) IV Push every 1 hour PRN  tetracaine/benzocaine/butamben Spray 1 Spray(s) Topical every 4 hours PRN      VITALS/PHYSICAL EXAM  --------------------------------------------------------------------------------  T(C): 36.8 (11-23-19 @ 09:16), Max: 36.8 (11-22-19 @ 22:04)  HR: 64 (11-23-19 @ 09:16) (59 - 64)  BP: 135/79 (11-23-19 @ 09:16) (130/75 - 158/78)  RR: 18 (11-23-19 @ 09:16) (18 - 18)  SpO2: 97% (11-23-19 @ 09:16) (96% - 98%)  Wt(kg): --        11-22-19 @ 07:01  -  11-23-19 @ 07:00  --------------------------------------------------------  IN: 1096 mL / OUT: 2475 mL / NET: -1379 mL    11-23-19 @ 07:01  -  11-23-19 @ 09:46  --------------------------------------------------------  IN: 120 mL / OUT: 850 mL / NET: -730 mL      Physical Exam:  	Gen: NAD, well-appearing  	Pulm: CTA B/L  	CV: RRR, S1S2; no rub  	Abd:  soft, nontender/nondistended  	Skin: Warm, without rashes          Extremities:  without edema          PICC site:  C/D/I           LABS/STUDIES  --------------------------------------------------------------------------------              8.7    6.70  >-----------<  254      [11-22-19 @ 06:57]              27.6     140  |  103  |  14  ----------------------------<  92      [11-23-19 @ 06:10]  4.3   |  27  |  0.84        Ca     9.0     [11-23-19 @ 06:10]      iCa    1.18     [11-23 @ 06:16]      Mg     2.4     [11-23-19 @ 06:10]      Phos  3.9     [11-23-19 @ 06:10]    TPro  5.8  /  Alb  3.6  /  TBili  0.2  /  DBili  x   /  AST  18  /  ALT  22  /  AlkPhos  85  [11-23-19 @ 06:10]          Ca ionized  Creatinine Trend:  POC glucoseGlucose, Serum: 92 mg/dL (11-23-19 @ 06:10)  CAPILLARY BLOOD GLUCOSE      POCT Blood Glucose.: 88 mg/dL (23 Nov 2019 05:29)  POCT Blood Glucose.: 123 mg/dL (23 Nov 2019 00:16)  POCT Blood Glucose.: 108 mg/dL (22 Nov 2019 17:26)    PrealbuminPrealbumin, Serum: 21 mg/dL (11-22-19 @ 08:19)    Triglycerides

## 2019-11-23 NOTE — PROGRESS NOTE ADULT - ASSESSMENT
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 now with RBF, awaiting surgery tuesday    Plan:  - TPN  -continue advance to [facility]  - OR tuesday  - Restart home allopurinol  - Regular preop  - f/u hematology plan regarding B cell lymphoma and current chemotherapy    Red Team Surgery  [id number]

## 2019-11-23 NOTE — PROGRESS NOTE ADULT - ASSESSMENT
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 Barber and Dr. 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 Barber and Dr. Montes De Oca

## 2019-11-23 NOTE — PROGRESS NOTE ADULT - SUBJECTIVE AND OBJECTIVE BOX
Patient is a 69y old  Male who presents with a chief complaint of SBO (23 Nov 2019 09:45)      INTERVAL HPI/OVERNIGHT EVENTS:feels well, ambulating in hallway      Vital Signs Last 24 Hrs  T(C): 36.6 (23 Nov 2019 13:35), Max: 36.8 (22 Nov 2019 22:04)  T(F): 97.9 (23 Nov 2019 13:35), Max: 98.2 (22 Nov 2019 22:04)  HR: 68 (23 Nov 2019 13:35) (59 - 68)  BP: 126/73 (23 Nov 2019 13:35) (126/73 - 158/78)  BP(mean): --  RR: 18 (23 Nov 2019 13:35) (18 - 18)  SpO2: 99% (23 Nov 2019 13:35) (96% - 99%)    allopurinol 100 milliGRAM(s) Oral daily  benzocaine 15 mG/menthol 3.6 mG Lozenge 1 Lozenge Oral three times a day PRN  chlorhexidine 2% Cloths 1 Application(s) Topical daily  chlorhexidine 4% Liquid 1 Application(s) Topical <User Schedule>  enoxaparin Injectable 40 milliGRAM(s) SubCutaneous daily  fat emulsion (Fish Oil and Plant Based) 20% Infusion 8.3 mL/Hr IV Continuous <Continuous>  ibuprofen  Tablet. 400 milliGRAM(s) Oral every 6 hours PRN  influenza   Vaccine 0.5 milliLiter(s) IntraMuscular once  levothyroxine Injectable 50 MICROGram(s) IV Push at bedtime  metoprolol tartrate Injectable 5 milliGRAM(s) IV Push every 6 hours  pantoprazole  Injectable 40 milliGRAM(s) IV Push daily  Parenteral Nutrition - Adult 1 Each TPN Continuous <Continuous>  Parenteral Nutrition - Adult 1 Each TPN Continuous <Continuous>  sodium chloride 0.9% lock flush 10 milliLiter(s) IV Push every 1 hour PRN  tetracaine/benzocaine/butamben Spray 1 Spray(s) Topical every 4 hours PRN      PHYSICAL EXAM:  GENERAL: NAD,   EYES: conjunctiva and sclera clear  ENMT: Moist mucous membranes  NECK: Supple, No JVD, Normal thyroid  NERVOUS SYSTEM:  Alert & Oriented X,   CHEST/LUNG: Clear to auscultation bilaterally; No rales, rhonchi, wheezing, or rubs  HEART: Regular rate and rhythm; No murmurs, rubs, or gallops  ABDOMEN: Soft, Nontender, Nondistended; Bowel sounds present  EXTREMITIES:  2+ Peripheral Pulses, No clubbing, cyanosis, or edema  LYMPH: No lymphadenopathy noted  SKIN: No rashes or lesions    Consultant(s) Notes Reviewed:  [x ] YES  [ ] NO  Care Discussed with Consultants/Other Providers [ x] YES  [ ] NO    LABS:                        9.2    5.33  )-----------( 252      ( 23 Nov 2019 10:52 )             29.7     11-23    140  |  103  |  14  ----------------------------<  92  4.3   |  27  |  0.84    Ca    9.0      23 Nov 2019 06:10  Phos  3.9     11-23  Mg     2.4     11-23    TPro  5.8<L>  /  Alb  3.6  /  TBili  0.2  /  DBili  x   /  AST  18  /  ALT  22  /  AlkPhos  85  11-23        CAPILLARY BLOOD GLUCOSE      POCT Blood Glucose.: 88 mg/dL (23 Nov 2019 05:29)  POCT Blood Glucose.: 123 mg/dL (23 Nov 2019 00:16)  POCT Blood Glucose.: 108 mg/dL (22 Nov 2019 17:26)              RADIOLOGY & ADDITIONAL TESTS:    Imaging Personally Reviewed:  [x ] YES  [ ] NO

## 2019-11-23 NOTE — PROGRESS NOTE ADULT - SUBJECTIVE AND OBJECTIVE BOX
No acute events overnight    SUBJECTIVE:  feels well  Denies nausea, vomiting, diarrhea  Is passing gas and having bowel movements  Tolerating diet  Ambulating independently    OBJECTIVE:  Vital Signs Last 24 Hrs  T(C): 36.4 (23 Nov 2019 04:55), Max: 36.8 (22 Nov 2019 22:04)  T(F): 97.6 (23 Nov 2019 04:55), Max: 98.2 (22 Nov 2019 22:04)  HR: 62 (23 Nov 2019 04:55) (59 - 65)  BP: 130/75 (23 Nov 2019 04:55) (122/79 - 158/78)  BP(mean): --  RR: 18 (23 Nov 2019 04:55) (18 - 18)  SpO2: 96% (23 Nov 2019 04:55) (96% - 98%)      Physical Examination:  GEN: NAD, resting quietly  NEURO: AAOx3, CN II-XII grossly intact, no focal deficits  PULM: symmetric chest rise bilaterally, no increased WOB  ABD: soft, nontender, mildly distended  EXTR: no cyanosis or edema, moving all extremities    LABS:                        8.7    6.70  )-----------( 254      ( 22 Nov 2019 06:57 )             27.6       11-23    140  |  103  |  14  ----------------------------<  92  4.3   |  27  |  0.84    Ca    9.0      23 Nov 2019 06:10  Phos  3.9     11-23  Mg     2.4     11-23    TPro  5.8<L>  /  Alb  3.6  /  TBili  0.2  /  DBili  x   /  AST  18  /  ALT  22  /  AlkPhos  85  11-23

## 2019-11-24 LAB
ALBUMIN SERPL ELPH-MCNC: 3.6 G/DL — SIGNIFICANT CHANGE UP (ref 3.3–5)
ALP SERPL-CCNC: 81 U/L — SIGNIFICANT CHANGE UP (ref 40–120)
ALT FLD-CCNC: 33 U/L — SIGNIFICANT CHANGE UP (ref 10–45)
ANION GAP SERPL CALC-SCNC: 9 MMOL/L — SIGNIFICANT CHANGE UP (ref 5–17)
AST SERPL-CCNC: 23 U/L — SIGNIFICANT CHANGE UP (ref 10–40)
BASOPHILS # BLD AUTO: 0.04 K/UL — SIGNIFICANT CHANGE UP (ref 0–0.2)
BASOPHILS NFR BLD AUTO: 0.7 % — SIGNIFICANT CHANGE UP (ref 0–2)
BILIRUB SERPL-MCNC: 0.1 MG/DL — LOW (ref 0.2–1.2)
BUN SERPL-MCNC: 18 MG/DL — SIGNIFICANT CHANGE UP (ref 7–23)
CA-I BLD-SCNC: 1.15 MMOL/L — SIGNIFICANT CHANGE UP (ref 1.12–1.3)
CALCIUM SERPL-MCNC: 8.9 MG/DL — SIGNIFICANT CHANGE UP (ref 8.4–10.5)
CHLORIDE SERPL-SCNC: 101 MMOL/L — SIGNIFICANT CHANGE UP (ref 96–108)
CO2 SERPL-SCNC: 26 MMOL/L — SIGNIFICANT CHANGE UP (ref 22–31)
CREAT SERPL-MCNC: 0.83 MG/DL — SIGNIFICANT CHANGE UP (ref 0.5–1.3)
EOSINOPHIL # BLD AUTO: 0.03 K/UL — SIGNIFICANT CHANGE UP (ref 0–0.5)
EOSINOPHIL NFR BLD AUTO: 0.5 % — SIGNIFICANT CHANGE UP (ref 0–6)
GLUCOSE BLDC GLUCOMTR-MCNC: 108 MG/DL — HIGH (ref 70–99)
GLUCOSE BLDC GLUCOMTR-MCNC: 114 MG/DL — HIGH (ref 70–99)
GLUCOSE BLDC GLUCOMTR-MCNC: 117 MG/DL — HIGH (ref 70–99)
GLUCOSE BLDC GLUCOMTR-MCNC: 121 MG/DL — HIGH (ref 70–99)
GLUCOSE BLDC GLUCOMTR-MCNC: 129 MG/DL — HIGH (ref 70–99)
GLUCOSE SERPL-MCNC: 206 MG/DL — HIGH (ref 70–99)
HCT VFR BLD CALC: 29.5 % — LOW (ref 39–50)
HGB BLD-MCNC: 9.1 G/DL — LOW (ref 13–17)
IMM GRANULOCYTES NFR BLD AUTO: 0.3 % — SIGNIFICANT CHANGE UP (ref 0–1.5)
LYMPHOCYTES # BLD AUTO: 0.38 K/UL — LOW (ref 1–3.3)
LYMPHOCYTES # BLD AUTO: 6.5 % — LOW (ref 13–44)
MAGNESIUM SERPL-MCNC: 2.3 MG/DL — SIGNIFICANT CHANGE UP (ref 1.6–2.6)
MCHC RBC-ENTMCNC: 26 PG — LOW (ref 27–34)
MCHC RBC-ENTMCNC: 30.8 GM/DL — LOW (ref 32–36)
MCV RBC AUTO: 84.3 FL — SIGNIFICANT CHANGE UP (ref 80–100)
MONOCYTES # BLD AUTO: 0.91 K/UL — HIGH (ref 0–0.9)
MONOCYTES NFR BLD AUTO: 15.5 % — HIGH (ref 2–14)
NEUTROPHILS # BLD AUTO: 4.49 K/UL — SIGNIFICANT CHANGE UP (ref 1.8–7.4)
NEUTROPHILS NFR BLD AUTO: 76.5 % — SIGNIFICANT CHANGE UP (ref 43–77)
PHOSPHATE SERPL-MCNC: 4 MG/DL — SIGNIFICANT CHANGE UP (ref 2.5–4.5)
PLATELET # BLD AUTO: 239 K/UL — SIGNIFICANT CHANGE UP (ref 150–400)
POTASSIUM SERPL-MCNC: 4.4 MMOL/L — SIGNIFICANT CHANGE UP (ref 3.5–5.3)
POTASSIUM SERPL-SCNC: 4.4 MMOL/L — SIGNIFICANT CHANGE UP (ref 3.5–5.3)
PROT SERPL-MCNC: 5.7 G/DL — LOW (ref 6–8.3)
RBC # BLD: 3.5 M/UL — LOW (ref 4.2–5.8)
RBC # FLD: 24.4 % — HIGH (ref 10.3–14.5)
SODIUM SERPL-SCNC: 136 MMOL/L — SIGNIFICANT CHANGE UP (ref 135–145)
TRIGL SERPL-MCNC: 181 MG/DL — HIGH (ref 10–149)
WBC # BLD: 5.87 K/UL — SIGNIFICANT CHANGE UP (ref 3.8–10.5)
WBC # FLD AUTO: 5.87 K/UL — SIGNIFICANT CHANGE UP (ref 3.8–10.5)

## 2019-11-24 RX ORDER — I.V. FAT EMULSION 20 G/100ML
8.3 EMULSION INTRAVENOUS
Qty: 20 | Refills: 0 | Status: DISCONTINUED | OUTPATIENT
Start: 2019-11-24 | End: 2019-11-24

## 2019-11-24 RX ORDER — ELECTROLYTE SOLUTION,INJ
1 VIAL (ML) INTRAVENOUS
Refills: 0 | Status: DISCONTINUED | OUTPATIENT
Start: 2019-11-24 | End: 2019-11-24

## 2019-11-24 RX ORDER — ACETAMINOPHEN 500 MG
650 TABLET ORAL EVERY 6 HOURS
Refills: 0 | Status: DISCONTINUED | OUTPATIENT
Start: 2019-11-24 | End: 2019-11-26

## 2019-11-24 RX ADMIN — ENOXAPARIN SODIUM 40 MILLIGRAM(S): 100 INJECTION SUBCUTANEOUS at 12:03

## 2019-11-24 RX ADMIN — Medication 50 MICROGRAM(S): at 21:14

## 2019-11-24 RX ADMIN — Medication 650 MILLIGRAM(S): at 18:47

## 2019-11-24 RX ADMIN — Medication 1 EACH: at 17:36

## 2019-11-24 RX ADMIN — Medication 5 MILLIGRAM(S): at 18:20

## 2019-11-24 RX ADMIN — Medication 100 MILLIGRAM(S): at 12:03

## 2019-11-24 RX ADMIN — PANTOPRAZOLE SODIUM 40 MILLIGRAM(S): 20 TABLET, DELAYED RELEASE ORAL at 12:03

## 2019-11-24 RX ADMIN — Medication 5 MILLIGRAM(S): at 12:02

## 2019-11-24 RX ADMIN — CHLORHEXIDINE GLUCONATE 1 APPLICATION(S): 213 SOLUTION TOPICAL at 09:41

## 2019-11-24 RX ADMIN — I.V. FAT EMULSION 8.3 ML/HR: 20 EMULSION INTRAVENOUS at 17:36

## 2019-11-24 NOTE — PROGRESS NOTE ADULT - SUBJECTIVE AND OBJECTIVE BOX
[facility] NUTRITION SUPPORT / TPN -- FOLLOW UP NOTE  --------------------------------------------------------------------------------    24 hour events/subjective:  Tolerating some clears without N/V.  +flatus, no BM.  Awating surgery Tuesday.    Diet:  Diet, Clear Liquid (11-23-19 @ 08:49)      PAST HISTORY  --------------------------------------------------------------------------------  No significant changes to PMH, PSH, FHx, SHx, unless otherwise noted    ALLERGIES & MEDICATIONS  --------------------------------------------------------------------------------  Allergies    No Known Allergies    Intolerances      Standing Inpatient Medications  allopurinol 100 milliGRAM(s) Oral daily  chlorhexidine 2% Cloths 1 Application(s) Topical daily  chlorhexidine 4% Liquid 1 Application(s) Topical <User Schedule>  enoxaparin Injectable 40 milliGRAM(s) SubCutaneous daily  influenza   Vaccine 0.5 milliLiter(s) IntraMuscular once  levothyroxine Injectable 50 MICROGram(s) IV Push at bedtime  metoprolol tartrate Injectable 5 milliGRAM(s) IV Push every 6 hours  pantoprazole  Injectable 40 milliGRAM(s) IV Push daily  Parenteral Nutrition - Adult 1 Each TPN Continuous <Continuous>    PRN Inpatient Medications  benzocaine 15 mG/menthol 3.6 mG Lozenge 1 Lozenge Oral three times a day PRN  ibuprofen  Tablet. 400 milliGRAM(s) Oral every 6 hours PRN  sodium chloride 0.9% lock flush 10 milliLiter(s) IV Push every 1 hour PRN  tetracaine/benzocaine/butamben Spray 1 Spray(s) Topical every 4 hours PRN      VITALS/PHYSICAL EXAM  --------------------------------------------------------------------------------  T(C): 36.8 (11-24-19 @ 05:28), Max: 36.9 (11-23-19 @ 21:17)  HR: 55 (11-24-19 @ 05:28) (55 - 68)  BP: 126/67 (11-24-19 @ 05:28) (126/67 - 135/79)  RR: 18 (11-24-19 @ 05:28) (18 - 18)  SpO2: 95% (11-24-19 @ 05:28) (95% - 99%)  Wt(kg): --        11-23-19 @ 07:01  -  11-24-19 @ 07:00  --------------------------------------------------------  IN: 2811.6 mL / OUT: 2350 mL / NET: 461.6 mL             Physical Exam:  	Gen: NAD, well-appearing  	Pulm: CTA B/L  	CV: RRR, S1S2; no rub  	Abd:  soft, nontender/nondistended  	Skin: Warm, without rashes          Extremities:  without edema          PICC site:  C/D/I          LABS/STUDIES  --------------------------------------------------------------------------------              9.2    5.33  >-----------<  252      [11-23-19 @ 10:52]              29.7     136  |  101  |  18  ----------------------------<  206      [11-24-19 @ 04:45]  4.4   |  26  |  0.83        Ca     8.9     [11-24-19 @ 04:45]      iCa    1.15     [11-24 @ 04:49]      Mg     2.3     [11-24-19 @ 04:45]      Phos  4.0     [11-24-19 @ 04:45]    TPro  5.7  /  Alb  3.6  /  TBili  0.1  /  DBili  x   /  AST  23  /  ALT  33  /  AlkPhos  81  [11-24-19 @ 04:45]          Ca ionized  Creatinine Trend:  POC glucoseGlucose, Serum: 206 mg/dL (11-24-19 @ 04:45)  CAPILLARY BLOOD GLUCOSE      POCT Blood Glucose.: 129 mg/dL (24 Nov 2019 06:33)  POCT Blood Glucose.: 108 mg/dL (24 Nov 2019 00:23)  POCT Blood Glucose.: 120 mg/dL (23 Nov 2019 18:00)    PrealbuminPrealbumin, Serum: 21 mg/dL (11-22-19 @ 08:19)    Triglycerides

## 2019-11-24 NOTE — PROGRESS NOTE ADULT - SUBJECTIVE AND OBJECTIVE BOX
No acute events overnight    SUBJECTIVE:  feels well  Denies nausea, vomiting, diarrhea  Is passing gas and having bowel movements  Tolerating diet  Ambulating independently  Counselled surgery    OBJECTIVE:  Vital Signs Last 24 Hrs  T(C): 36.6 (24 Nov 2019 09:30), Max: 36.9 (23 Nov 2019 21:17)  T(F): 97.9 (24 Nov 2019 09:30), Max: 98.5 (23 Nov 2019 21:17)  HR: 65 (24 Nov 2019 12:01) (55 - 68)  BP: 146/82 (24 Nov 2019 12:01) (121/67 - 146/82)  BP(mean): --  RR: 18 (24 Nov 2019 09:30) (18 - 18)  SpO2: 97% (24 Nov 2019 09:30) (95% - 99%)    Physical Examination:  GEN: NAD, resting quietly  NEURO: AAOx3, CN II-XII grossly intact, no focal deficits  PULM: symmetric chest rise bilaterally, no increased WOB  ABD: soft, nontender, mildly distended  EXTR: no cyanosis or edema, moving all extremities    LABS:                        9.1    5.87  )-----------( 239      ( 24 Nov 2019 09:11 )             29.5     11-24    136  |  101  |  18  ----------------------------<  206<H>  4.4   |  26  |  0.83    Ca    8.9      24 Nov 2019 04:45  Phos  4.0     11-24  Mg     2.3     11-24    TPro  5.7<L>  /  Alb  3.6  /  TBili  0.1<L>  /  DBili  x   /  AST  23  /  ALT  33  /  AlkPhos  81  11-24

## 2019-11-24 NOTE — PROGRESS NOTE ADULT - ASSESSMENT
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 Barber and Dr. 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 Barber and Dr. Montes De Oca

## 2019-11-24 NOTE — PROGRESS NOTE ADULT - ASSESSMENT
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 now with RBF, awaiting surgery tuesday    Plan:  - TPN  - CLD  - OR tuesday  - Home allopurinol  - Regular preop  - f/u hematology plan regarding B cell lymphoma and current chemotherapy    Red Team Surgery  [id number]

## 2019-11-25 ENCOUNTER — TRANSCRIPTION ENCOUNTER (OUTPATIENT)
Age: 69
End: 2019-11-25

## 2019-11-25 LAB
ALBUMIN SERPL ELPH-MCNC: 3.6 G/DL — SIGNIFICANT CHANGE UP (ref 3.3–5)
ALP SERPL-CCNC: 83 U/L — SIGNIFICANT CHANGE UP (ref 40–120)
ALT FLD-CCNC: 30 U/L — SIGNIFICANT CHANGE UP (ref 10–45)
ANION GAP SERPL CALC-SCNC: 11 MMOL/L — SIGNIFICANT CHANGE UP (ref 5–17)
AST SERPL-CCNC: 19 U/L — SIGNIFICANT CHANGE UP (ref 10–40)
BASOPHILS # BLD AUTO: 0.04 K/UL — SIGNIFICANT CHANGE UP (ref 0–0.2)
BASOPHILS NFR BLD AUTO: 0.8 % — SIGNIFICANT CHANGE UP (ref 0–2)
BILIRUB SERPL-MCNC: 0.1 MG/DL — LOW (ref 0.2–1.2)
BUN SERPL-MCNC: 19 MG/DL — SIGNIFICANT CHANGE UP (ref 7–23)
CA-I BLD-SCNC: 1.19 MMOL/L — SIGNIFICANT CHANGE UP (ref 1.12–1.3)
CALCIUM SERPL-MCNC: 9.1 MG/DL — SIGNIFICANT CHANGE UP (ref 8.4–10.5)
CHLORIDE SERPL-SCNC: 103 MMOL/L — SIGNIFICANT CHANGE UP (ref 96–108)
CO2 SERPL-SCNC: 25 MMOL/L — SIGNIFICANT CHANGE UP (ref 22–31)
CREAT SERPL-MCNC: 0.86 MG/DL — SIGNIFICANT CHANGE UP (ref 0.5–1.3)
EOSINOPHIL # BLD AUTO: 0.05 K/UL — SIGNIFICANT CHANGE UP (ref 0–0.5)
EOSINOPHIL NFR BLD AUTO: 1 % — SIGNIFICANT CHANGE UP (ref 0–6)
GLUCOSE BLDC GLUCOMTR-MCNC: 128 MG/DL — HIGH (ref 70–99)
GLUCOSE SERPL-MCNC: 159 MG/DL — HIGH (ref 70–99)
HCT VFR BLD CALC: 31.6 % — LOW (ref 39–50)
HGB BLD-MCNC: 9.8 G/DL — LOW (ref 13–17)
IMM GRANULOCYTES NFR BLD AUTO: 1 % — SIGNIFICANT CHANGE UP (ref 0–1.5)
LYMPHOCYTES # BLD AUTO: 0.31 K/UL — LOW (ref 1–3.3)
LYMPHOCYTES # BLD AUTO: 6.2 % — LOW (ref 13–44)
MAGNESIUM SERPL-MCNC: 2.3 MG/DL — SIGNIFICANT CHANGE UP (ref 1.6–2.6)
MCHC RBC-ENTMCNC: 26.1 PG — LOW (ref 27–34)
MCHC RBC-ENTMCNC: 31 GM/DL — LOW (ref 32–36)
MCV RBC AUTO: 84 FL — SIGNIFICANT CHANGE UP (ref 80–100)
MONOCYTES # BLD AUTO: 0.75 K/UL — SIGNIFICANT CHANGE UP (ref 0–0.9)
MONOCYTES NFR BLD AUTO: 15 % — HIGH (ref 2–14)
NEUTROPHILS # BLD AUTO: 3.81 K/UL — SIGNIFICANT CHANGE UP (ref 1.8–7.4)
NEUTROPHILS NFR BLD AUTO: 76 % — SIGNIFICANT CHANGE UP (ref 43–77)
PHOSPHATE SERPL-MCNC: 4.1 MG/DL — SIGNIFICANT CHANGE UP (ref 2.5–4.5)
PLATELET # BLD AUTO: 238 K/UL — SIGNIFICANT CHANGE UP (ref 150–400)
POTASSIUM SERPL-MCNC: 4.4 MMOL/L — SIGNIFICANT CHANGE UP (ref 3.5–5.3)
POTASSIUM SERPL-SCNC: 4.4 MMOL/L — SIGNIFICANT CHANGE UP (ref 3.5–5.3)
PROT SERPL-MCNC: 5.8 G/DL — LOW (ref 6–8.3)
RBC # BLD: 3.76 M/UL — LOW (ref 4.2–5.8)
RBC # FLD: 23.9 % — HIGH (ref 10.3–14.5)
SODIUM SERPL-SCNC: 139 MMOL/L — SIGNIFICANT CHANGE UP (ref 135–145)
TRIGL SERPL-MCNC: 254 MG/DL — HIGH (ref 10–149)
WBC # BLD: 5.01 K/UL — SIGNIFICANT CHANGE UP (ref 3.8–10.5)
WBC # FLD AUTO: 5.01 K/UL — SIGNIFICANT CHANGE UP (ref 3.8–10.5)

## 2019-11-25 PROCEDURE — 93010 ELECTROCARDIOGRAM REPORT: CPT

## 2019-11-25 PROCEDURE — 99232 SBSQ HOSP IP/OBS MODERATE 35: CPT

## 2019-11-25 RX ORDER — ELECTROLYTE SOLUTION,INJ
1 VIAL (ML) INTRAVENOUS
Refills: 0 | Status: DISCONTINUED | OUTPATIENT
Start: 2019-11-25 | End: 2019-11-25

## 2019-11-25 RX ORDER — COLCHICINE 0.6 MG
0.6 TABLET ORAL
Refills: 0 | Status: DISCONTINUED | OUTPATIENT
Start: 2019-11-25 | End: 2019-11-26

## 2019-11-25 RX ORDER — I.V. FAT EMULSION 20 G/100ML
8.3 EMULSION INTRAVENOUS
Qty: 20 | Refills: 0 | Status: DISCONTINUED | OUTPATIENT
Start: 2019-11-25 | End: 2019-11-25

## 2019-11-25 RX ADMIN — Medication 50 MICROGRAM(S): at 21:37

## 2019-11-25 RX ADMIN — Medication 0.6 MILLIGRAM(S): at 15:51

## 2019-11-25 RX ADMIN — Medication 1 EACH: at 17:32

## 2019-11-25 RX ADMIN — CHLORHEXIDINE GLUCONATE 1 APPLICATION(S): 213 SOLUTION TOPICAL at 12:21

## 2019-11-25 RX ADMIN — Medication 5 MILLIGRAM(S): at 17:37

## 2019-11-25 RX ADMIN — I.V. FAT EMULSION 8.3 ML/HR: 20 EMULSION INTRAVENOUS at 17:32

## 2019-11-25 RX ADMIN — Medication 5 MILLIGRAM(S): at 12:15

## 2019-11-25 RX ADMIN — PANTOPRAZOLE SODIUM 40 MILLIGRAM(S): 20 TABLET, DELAYED RELEASE ORAL at 12:15

## 2019-11-25 RX ADMIN — Medication 100 MILLIGRAM(S): at 12:15

## 2019-11-25 RX ADMIN — ENOXAPARIN SODIUM 40 MILLIGRAM(S): 100 INJECTION SUBCUTANEOUS at 12:15

## 2019-11-25 NOTE — PROGRESS NOTE ADULT - ASSESSMENT
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    **Pt for OR in the am.  Please DO NOT stop TPN.       If TPN needs to be stopped or runs out- Please start D10 at the same rate.     A new bag of TPN was ordered for as per protocol.**   Pt remains NPO w/ NGT;  TPN consulted for Pt w/ Protein-Calorie Malnutrition    TPN tolerated at GOAL AA & Dextrose & SMOF 20g 2/2 TG remain elevated    TPN recommendations as appropriate:   105 grams amino acids (700ml 15% a.a.)  280 grams dextrose (400ml 70% dex)  35 grams SMOFlipid (1751ml 20%IVFE @ 14.6ml/hr x 12 hours)    Total Calories: 1722 vicky/day (27cal/Kg per dosing wt 64.9Kg)  Total Protein: 105 Gm/day (1.6Gm/Kg per dosing wt 64.9Kg)  Micronutrients: 10ml MVI, 3ml MTE-5    Non-Protein Calories: 1302 vicky/day (20cal/Kg)  Dextrose Infusion Rate: 3 mg/Kg/min  Lipid Infusion Rate: 0.54 Gm/Kg/day; 0.04 Gm/Kg/hr      1. Dedicated Central line for TPN maintanence as per protocol  2. Strict Intake and Output.  3. Weights three times a week  4. Monitor BMP, Mg, Ionized Ca, Phosphorus daily  5. Check Triglycerides daily for first 3 days of TPN, then monthly   6. Pre-albumin weekly.  7. Fingersticks to monitor glucose every 6 hours until stable then may be decreased to twice a day,           coverage with ISS - to be ordered by primary team           Elevated serum glucose- HgA1c w/ am labs noted continue to monitor  8. Volume depletion - improving -Initiate TPN w/           NaCl 40mEq, NaAce 60mEq, NaPhos 30mMol, KCl 80mEq           HyperCa resolved w/ hydration - 10mEq Ca and 12mEqMg  9. Hem/ Onc appreciated  Continue as per Surgery, will follow with you, D/w primary team    Kendy Duque PA-C  TPN team, pager [phone]  D/w Corey Montes De Oca

## 2019-11-25 NOTE — PROGRESS NOTE ADULT - SUBJECTIVE AND OBJECTIVE BOX
No acute events overnight    SUBJECTIVE:  Reports no pain  Denies nausea, vomiting, diarrhea  Is passing gas but not having bowel movements  Tolerating liquid diet  Ambulating independently    OBJECTIVE:  Vital Signs Last 24 Hrs  T(C): 37 (25 Nov 2019 01:30), Max: 37 (25 Nov 2019 01:30)  T(F): 98.6 (25 Nov 2019 01:30), Max: 98.6 (25 Nov 2019 01:30)  HR: 62 (25 Nov 2019 01:30) (55 - 67)  BP: 147/87 (25 Nov 2019 01:30) (116/77 - 147/87)  BP(mean): --  RR: 18 (25 Nov 2019 01:30) (18 - 18)  SpO2: 98% (25 Nov 2019 01:30) (95% - 99%)      Physical Examination:  GEN: NAD, resting quietly  NEURO: AAOx3, CN II-XII grossly intact, no focal deficits  PULM: symmetric chest rise bilaterally, no increased WOB  ABD: soft, nontender, mildly distended  EXTR: no cyanosis or edema, moving all extremities    LABS:                        9.1    5.87  )-----------( 239      ( 24 Nov 2019 09:11 )             29.5       11-24    136  |  101  |  18  ----------------------------<  206<H>  4.4   |  26  |  0.83    Ca    8.9      24 Nov 2019 04:45  Phos  4.0     11-24  Mg     2.3     11-24    TPro  5.7<L>  /  Alb  3.6  /  TBili  0.1<L>  /  DBili  x   /  AST  23  /  ALT  33  /  AlkPhos  81  11-24

## 2019-11-25 NOTE — PROGRESS NOTE ADULT - SUBJECTIVE AND OBJECTIVE BOX
[facility] NUTRITION SUPPORT / TPN -- FOLLOW UP NOTE  --------------------------------------------------------------------------------    24 hour events/subjective:    Pt doing as well as can be,  NGT is out  NO N/V/D  tolerating TPN- no complaints  some gas no BM  No cp/palp/sob/dyspnea  no f/c/s  ambulating well w/o assist  Plan for OR- tomorrow, tue 11/26    STANDING INPATIENT MEDICATIONS    allopurinol 100 milliGRAM(s) Oral daily  chlorhexidine 2% Cloths 1 Application(s) Topical daily  chlorhexidine 4% Liquid 1 Application(s) Topical <User Schedule>  colchicine 0.6 milliGRAM(s) Oral two times a day  enoxaparin Injectable 40 milliGRAM(s) SubCutaneous daily  fat emulsion (Fish Oil and Plant Based) 20% Infusion 8.3 mL/Hr IV Continuous <Continuous>  influenza   Vaccine 0.5 milliLiter(s) IntraMuscular once  levothyroxine Injectable 50 MICROGram(s) IV Push at bedtime  metoprolol tartrate Injectable 5 milliGRAM(s) IV Push every 6 hours  pantoprazole  Injectable 40 milliGRAM(s) IV Push daily  Parenteral Nutrition - Adult 1 Each TPN Continuous <Continuous>  Parenteral Nutrition - Adult 1 Each TPN Continuous <Continuous>      PRN INPATIENT MEDICATION  acetaminophen   Tablet .. 650 milliGRAM(s) Oral every 6 hours PRN  benzocaine 15 mG/menthol 3.6 mG Lozenge 1 Lozenge Oral three times a day PRN  sodium chloride 0.9% lock flush 10 milliLiter(s) IV Push every 1 hour PRN  tetracaine/benzocaine/butamben Spray 1 Spray(s) Topical every 4 hours PRN        VITALS/PHYSICAL EXAM  --------------------------------------------------------------------------------  T(C): 36.9 (11-25-19 @ 14:32), Max: 37 (11-25-19 @ 01:30)  HR: 69 (11-25-19 @ 14:32) (58 - 82)  BP: 145/80 (11-25-19 @ 14:32) (116/77 - 147/87)  RR: 18 (11-25-19 @ 14:32) (18 - 18)  SpO2: 100% (11-25-19 @ 14:32) (98% - 100%)  Wt(kg): --        11-24-19 @ 07:01  -  11-25-19 @ 07:00  --------------------------------------------------------  IN: 2611.6 mL / OUT: 1700 mL / NET: 911.6 mL    11-25-19 @ 07:01  -  11-25-19 @ 14:36  --------------------------------------------------------  IN: 500 mL / OUT: 0 mL / NET: 500 mL      PHYSICAL EXAM  --------------------------------------------------------------------------------  	Gen: NAD, A&Ox3. WN/WD/WG  	HEENT: NC/AT. PERRL, supple neck, clear oropharynx, mucosa moist  	GI: No BS, soft, nontender/nondistended              MSK; FROMx4, no deformities              Vascular: Warm, no clubbing, cyanosis, nor edema                   LUE PICC dressing c/d/i  	Neuro: No focal deficits, sensation & strength grossly intact  	Psych: Normal affect and mood  	Skin: Warm, without rashes, good turgor  	      LABS/ CULTURES/ RADIOLOGY:              9.8    5.01  >-----------<  238      [11-25-19 @ 07:51]              31.6     139  |  103  |  19  ----------------------------<  159      [11-25-19 @ 05:01]  4.4   |  25  |  0.86        Ca     9.1     [11-25-19 @ 05:01]      iCa    1.19     [11-25 @ 05:06]      Mg     2.3     [11-25-19 @ 05:01]      Phos  4.1     [11-25-19 @ 05:01]    TPro  5.8  /  Alb  3.6  /  TBili  0.1  /  DBili  x   /  AST  19  /  ALT  30  /  AlkPhos  83  [11-25-19 @ 05:01]      CAPILLARY BLOOD GLUCOSE  POCT Blood Glucose.: 128 mg/dL (25 Nov 2019 05:20)  POCT Blood Glucose.: 117 mg/dL (24 Nov 2019 23:52)  POCT Blood Glucose.: 121 mg/dL (24 Nov 2019 18:03)    Prealbumin, Serum: 21 mg/dL (11-22-19 @ 08:19)    Triglycerides, Serum: 254 mg/dL (11.25.19 @ 05:01)  Triglycerides, Serum: 181 mg/dL (11.24.19 @ 04:45)  Triglycerides, Serum: 153 mg/dL (11.23.19 @ 06:10)

## 2019-11-25 NOTE — CHART NOTE - NSCHARTNOTEFT_GEN_A_CORE
Pre-Op Note    Pre-Op Diagnosis: Small bowel obstruction  Surgeon: Procaccino  Procedure: Open small bowel resection                          9.8    5.01  )-----------( 238      ( 25 Nov 2019 07:51 )             31.6     Hemoglobin: 9.8 g/dL (11-25 @ 07:51)      CBC Full  -  ( 25 Nov 2019 07:51 )  WBC Count : 5.01 K/uL  RBC Count : 3.76 M/uL  Hemoglobin : 9.8 g/dL  Hematocrit : 31.6 %  Platelet Count - Automated : 238 K/uL  Mean Cell Volume : 84.0 fl  Mean Cell Hemoglobin : 26.1 pg  Mean Cell Hemoglobin Concentration : 31.0 gm/dL  Auto Neutrophil # : 3.81 K/uL  Auto Lymphocyte # : 0.31 K/uL  Auto Monocyte # : 0.75 K/uL  Auto Eosinophil # : 0.05 K/uL  Auto Basophil # : 0.04 K/uL  Auto Neutrophil % : 76.0 %  Auto Lymphocyte % : 6.2 %  Auto Monocyte % : 15.0 %  Auto Eosinophil % : 1.0 %  Auto Basophil % : 0.8 %    11-25    139  |  103  |  19  ----------------------------<  159<H>  4.4   |  25  |  0.86    Ca    9.1      25 Nov 2019 05:01  Phos  4.1     11-25  Mg     2.3     11-25    TPro  5.8<L>  /  Alb  3.6  /  TBili  0.1<L>  /  DBili  x   /  AST  19  /  ALT  30  /  AlkPhos  83  11-25    Creatinine Trend: 0.86  LIVER FUNCTIONS - ( 25 Nov 2019 05:01 )  Alb: 3.6 g/dL / Pro: 5.8 g/dL / ALK PHOS: 83 U/L / ALT: 30 U/L / AST: 19 U/L / GGT: x           UA: pending     Type & Screen pending    EKG: pending    EXAM:  XR CHEST PORTABLE URGENT 1V                          PROCEDURE DATE:  11/21/2019      INTERPRETATION:  CLINICAL INFORMATION: Status post PICC placement.    EXAM: Frontal chest radiograph dated 11/21/2019.    COMPARISON: Chest radiograph from 11/20/2019.    FINDINGS:  Right chest wall Mediport and left PICC line terminate in the SVC. An   enteric tube terminates in the stomach.  The lungs are clear.  There are no pleural effusions or pneumothorax.  The cardiomediastinal silhouetteis within normal limits.    IMPRESSION:   Clear lungs.    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 Planned for OR tomorrow.    1. NPO at midnight  2. IVF at midnight  3. Order UA, order EKG, order T&S  4. Consent patient       Pager [phone] Pre-Op Note    Pre-Op Diagnosis: Small bowel obstruction  Surgeon: Procaccino  Procedure: Open small bowel resection                          9.8    5.01  )-----------( 238      ( 25 Nov 2019 07:51 )             31.6     Hemoglobin: 9.8 g/dL (11-25 @ 07:51)      CBC Full  -  ( 25 Nov 2019 07:51 )  WBC Count : 5.01 K/uL  RBC Count : 3.76 M/uL  Hemoglobin : 9.8 g/dL  Hematocrit : 31.6 %  Platelet Count - Automated : 238 K/uL  Mean Cell Volume : 84.0 fl  Mean Cell Hemoglobin : 26.1 pg  Mean Cell Hemoglobin Concentration : 31.0 gm/dL  Auto Neutrophil # : 3.81 K/uL  Auto Lymphocyte # : 0.31 K/uL  Auto Monocyte # : 0.75 K/uL  Auto Eosinophil # : 0.05 K/uL  Auto Basophil # : 0.04 K/uL  Auto Neutrophil % : 76.0 %  Auto Lymphocyte % : 6.2 %  Auto Monocyte % : 15.0 %  Auto Eosinophil % : 1.0 %  Auto Basophil % : 0.8 %    11-25    139  |  103  |  19  ----------------------------<  159<H>  4.4   |  25  |  0.86    Ca    9.1      25 Nov 2019 05:01  Phos  4.1     11-25  Mg     2.3     11-25    TPro  5.8<L>  /  Alb  3.6  /  TBili  0.1<L>  /  DBili  x   /  AST  19  /  ALT  30  /  AlkPhos  83  11-25    Creatinine Trend: 0.86  LIVER FUNCTIONS - ( 25 Nov 2019 05:01 )  Alb: 3.6 g/dL / Pro: 5.8 g/dL / ALK PHOS: 83 U/L / ALT: 30 U/L / AST: 19 U/L / GGT: x           UA: pending     Type & Screen pending    EKG: pending    EXAM:  XR CHEST PORTABLE URGENT 1V                          PROCEDURE DATE:  11/21/2019      INTERPRETATION:  CLINICAL INFORMATION: Status post PICC placement.    EXAM: Frontal chest radiograph dated 11/21/2019.    COMPARISON: Chest radiograph from 11/20/2019.    FINDINGS:  Right chest wall Mediport and left PICC line terminate in the SVC. An   enteric tube terminates in the stomach.  The lungs are clear.  There are no pleural effusions or pneumothorax.  The cardiomediastinal silhouetteis within normal limits.    IMPRESSION:   Clear lungs.    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 Planned for OR tomorrow.    1. NPO at midnight  2. IVF at midnight  3. Order UA, order EKG, order T&S, order coags  4. Consent patient         Pager [phone]

## 2019-11-25 NOTE — PROGRESS NOTE ADULT - ASSESSMENT
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 now with RBF, awaiting surgery tomorrow    Plan:  - TPN  - CLD  - OR tomorrow  - Home allopurinol  - Regular preop  - f/u hematology plan regarding B cell lymphoma and current chemotherapy    Red Team Surgery  [id number] 69M with recent diagnosis of B cell lymphoma (with a jejunal mass) and has completed 2 cycles of R-CHOP therapy (3rd dose is 11/21) who presented with SBO. now with RBF, awaiting surgery tomorrow    Plan:  - TPN  - CLD, NPO at midnight w/ IVF  - OR tomorrow  - Home allopurinol  - Regular preop  - f/u hematology plan regarding B cell lymphoma and current chemotherapy: no chemo while in hospital    Red Team Surgery  [id number]

## 2019-11-25 NOTE — CHART NOTE - NSCHARTNOTEFT_GEN_A_CORE
Nutrition Follow Up Note    Patient seen for: malnutrition/TPN follow up    Source: patient, medical record, TPN Team rounds    Chart reviewed, events noted. 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 TPN initiated , currently infusing at goal with good tolerance; lipids remain below goal, at 20Gm/day, in setting of elevated triglycerides. Diet advanced to clear liquids on  with good tolerance. Plan for OR .    Diet : Clear liquids    Parenteral Nutrition: TPN (ordered ) infusing at 83ml/hr (105Gm amino acids, 280Gm dextrose, 20Gm SMOF lipids) to provide 1575cal/day (24cal/Kg and 1.6Gm protein/Kg per dosing wt 64.9Kg); with 10ml MVI, 3ml MTE-5. Non-protein calories = 1155 vicky/day (18cal/Kg).     Dextrose Infusion Rate: 3 mg/Kg/min  Lipid Infusion Rate: 0.3 Gm/Kg/day; 0.03 Gm/Kg/hr (reduced for elevated triglycerides)    Last BM:     Daily Weight in k.9 (-)    Drug Dosing Weight  Weight (kg): 64.9 (2019 19:02)  BMI (kg/m2): 24.5 (2019 19:02)    Pertinent Medications: MEDICATIONS  (STANDING):  allopurinol 100 milliGRAM(s) Oral daily  chlorhexidine 2% Cloths 1 Application(s) Topical daily  chlorhexidine 4% Liquid 1 Application(s) Topical <User Schedule>  enoxaparin Injectable 40 milliGRAM(s) SubCutaneous daily  fat emulsion (Fish Oil and Plant Based) 20% Infusion 8.3 mL/Hr (8.3 mL/Hr) IV Continuous <Continuous>  influenza   Vaccine 0.5 milliLiter(s) IntraMuscular once  levothyroxine Injectable 50 MICROGram(s) IV Push at bedtime  metoprolol tartrate Injectable 5 milliGRAM(s) IV Push every 6 hours  pantoprazole  Injectable 40 milliGRAM(s) IV Push daily  Parenteral Nutrition - Adult 1 Each (83 mL/Hr) TPN Continuous <Continuous>  Parenteral Nutrition - Adult 1 Each (83 mL/Hr) TPN Continuous <Continuous>    MEDICATIONS  (PRN):  acetaminophen   Tablet .. 650 milliGRAM(s) Oral every 6 hours PRN Moderate Pain (4 - 6)  benzocaine 15 mG/menthol 3.6 mG Lozenge 1 Lozenge Oral three times a day PRN Sore Throat  sodium chloride 0.9% lock flush 10 milliLiter(s) IV Push every 1 hour PRN Pre/post blood products, medications, blood draw, and to maintain line patency  tetracaine/benzocaine/butamben Spray 1 Spray(s) Topical every 4 hours PRN sore throat    LABS:    @ 07:51: Hemoglobin 9.8<L>, Hematocrit 31.6<L>   @ 05:01: Sodium 139, Potassium 4.4, Chloride 103, Calcium 9.1, Magnesium 2.3, Phosphorus 4.1, BUN 19, Creatinine 0.86, <H>, Alk Phos 83, ALT/SGPT 30, AST/SGOT 19, Total Protein 5.8<L>, Albumin 3.6, Total Bilirubin 0.1<L>,     Triglycerides, Serum: 254 mg/dL (19 @ 05:01)  Triglycerides, Serum: 181 mg/dL (19 @ 04:45)  Triglycerides, Serum: 153 mg/dL (19 @ 06:10)  Triglycerides, Serum: 139 mg/dL (19 @ 05:17)  Triglycerides, Serum: 189 mg/dL (19 @ 12:32)    POCT Blood Glucose.: 128 mg/dL (2019 05:20)  POCT Blood Glucose.: 117 mg/dL (2019 23:52)  POCT Blood Glucose.: 121 mg/dL (2019 18:03)    Skin per nursing documentation: no pressure injuries noted  Edema: none noted    Estimated Needs: based on dosing wt 64.9Kg with consideration for malnutrition and TPN  0458-1777 vicky/day (25-30cal/Kg)   Gm protein/day (1.4-1.6Gm/Kg)    Previous Nutrition Diagnosis: Severe malnutrition  Nutrition Diagnosis is: ongoing, being addressed with TPN and clear liquid diet    New Nutrition Diagnosis: none     Interventions: continue TPN, monitor return of GI function post-op    Recommend  1) TPN per TPN Team/Nutrition assessment  2) Continue clear liquid diet; plan for OR   3) Monitor return of GI function post-op and ability to advance diet    Monitoring and Evaluation:     Continue to monitor nutrition provision and tolerance, weights, labs, skin integrity.    RD remains available upon request and will follow up per protocol.    Marita Ponce MS RD CDN [facility], Pager # [phone]

## 2019-11-26 ENCOUNTER — RESULT REVIEW (OUTPATIENT)
Age: 69
End: 2019-11-26

## 2019-11-26 ENCOUNTER — APPOINTMENT (OUTPATIENT)
Dept: COLORECTAL SURGERY | Facility: HOSPITAL | Age: 69
End: 2019-11-26

## 2019-11-26 LAB
ALBUMIN SERPL ELPH-MCNC: 3.6 G/DL — SIGNIFICANT CHANGE UP (ref 3.3–5)
ALP SERPL-CCNC: 84 U/L — SIGNIFICANT CHANGE UP (ref 40–120)
ALT FLD-CCNC: 25 U/L — SIGNIFICANT CHANGE UP (ref 10–45)
ANION GAP SERPL CALC-SCNC: 10 MMOL/L — SIGNIFICANT CHANGE UP (ref 5–17)
APPEARANCE UR: CLEAR — SIGNIFICANT CHANGE UP
APTT BLD: 31.1 SEC — SIGNIFICANT CHANGE UP (ref 27.5–36.3)
AST SERPL-CCNC: 14 U/L — SIGNIFICANT CHANGE UP (ref 10–40)
BASOPHILS # BLD AUTO: 0.05 K/UL — SIGNIFICANT CHANGE UP (ref 0–0.2)
BASOPHILS NFR BLD AUTO: 0.9 % — SIGNIFICANT CHANGE UP (ref 0–2)
BILIRUB SERPL-MCNC: 0.2 MG/DL — SIGNIFICANT CHANGE UP (ref 0.2–1.2)
BILIRUB UR-MCNC: NEGATIVE — SIGNIFICANT CHANGE UP
BLD GP AB SCN SERPL QL: NEGATIVE — SIGNIFICANT CHANGE UP
BUN SERPL-MCNC: 21 MG/DL — SIGNIFICANT CHANGE UP (ref 7–23)
CA-I BLD-SCNC: 1.25 MMOL/L — SIGNIFICANT CHANGE UP (ref 1.12–1.3)
CALCIUM SERPL-MCNC: 9 MG/DL — SIGNIFICANT CHANGE UP (ref 8.4–10.5)
CHLORIDE SERPL-SCNC: 102 MMOL/L — SIGNIFICANT CHANGE UP (ref 96–108)
CO2 SERPL-SCNC: 25 MMOL/L — SIGNIFICANT CHANGE UP (ref 22–31)
COLOR SPEC: COLORLESS — SIGNIFICANT CHANGE UP
CREAT SERPL-MCNC: 0.87 MG/DL — SIGNIFICANT CHANGE UP (ref 0.5–1.3)
DIFF PNL FLD: NEGATIVE — SIGNIFICANT CHANGE UP
EOSINOPHIL # BLD AUTO: 0.05 K/UL — SIGNIFICANT CHANGE UP (ref 0–0.5)
EOSINOPHIL NFR BLD AUTO: 0.9 % — SIGNIFICANT CHANGE UP (ref 0–6)
GLUCOSE BLDC GLUCOMTR-MCNC: 115 MG/DL — HIGH (ref 70–99)
GLUCOSE SERPL-MCNC: 174 MG/DL — HIGH (ref 70–99)
GLUCOSE UR QL: NEGATIVE — SIGNIFICANT CHANGE UP
HCT VFR BLD CALC: 30.3 % — LOW (ref 39–50)
HGB BLD-MCNC: 9.6 G/DL — LOW (ref 13–17)
IMM GRANULOCYTES NFR BLD AUTO: 0.7 % — SIGNIFICANT CHANGE UP (ref 0–1.5)
INR BLD: 1.05 RATIO — SIGNIFICANT CHANGE UP (ref 0.88–1.16)
KETONES UR-MCNC: NEGATIVE — SIGNIFICANT CHANGE UP
LEUKOCYTE ESTERASE UR-ACNC: NEGATIVE — SIGNIFICANT CHANGE UP
LYMPHOCYTES # BLD AUTO: 0.39 K/UL — LOW (ref 1–3.3)
LYMPHOCYTES # BLD AUTO: 7.1 % — LOW (ref 13–44)
MAGNESIUM SERPL-MCNC: 2.2 MG/DL — SIGNIFICANT CHANGE UP (ref 1.6–2.6)
MCHC RBC-ENTMCNC: 26.4 PG — LOW (ref 27–34)
MCHC RBC-ENTMCNC: 31.7 GM/DL — LOW (ref 32–36)
MCV RBC AUTO: 83.2 FL — SIGNIFICANT CHANGE UP (ref 80–100)
MONOCYTES # BLD AUTO: 0.89 K/UL — SIGNIFICANT CHANGE UP (ref 0–0.9)
MONOCYTES NFR BLD AUTO: 16.2 % — HIGH (ref 2–14)
NEUTROPHILS # BLD AUTO: 4.08 K/UL — SIGNIFICANT CHANGE UP (ref 1.8–7.4)
NEUTROPHILS NFR BLD AUTO: 74.2 % — SIGNIFICANT CHANGE UP (ref 43–77)
NITRITE UR-MCNC: NEGATIVE — SIGNIFICANT CHANGE UP
PH UR: 6 — SIGNIFICANT CHANGE UP (ref 5–8)
PHOSPHATE SERPL-MCNC: 3.9 MG/DL — SIGNIFICANT CHANGE UP (ref 2.5–4.5)
PLATELET # BLD AUTO: 231 K/UL — SIGNIFICANT CHANGE UP (ref 150–400)
POTASSIUM SERPL-MCNC: 4.2 MMOL/L — SIGNIFICANT CHANGE UP (ref 3.5–5.3)
POTASSIUM SERPL-SCNC: 4.2 MMOL/L — SIGNIFICANT CHANGE UP (ref 3.5–5.3)
PREALB SERPL-MCNC: 22 MG/DL — SIGNIFICANT CHANGE UP (ref 20–40)
PROT SERPL-MCNC: 6 G/DL — SIGNIFICANT CHANGE UP (ref 6–8.3)
PROT UR-MCNC: NEGATIVE — SIGNIFICANT CHANGE UP
PROTHROM AB SERPL-ACNC: 12.1 SEC — SIGNIFICANT CHANGE UP (ref 10–13.1)
RBC # BLD: 3.64 M/UL — LOW (ref 4.2–5.8)
RBC # FLD: 23.9 % — HIGH (ref 10.3–14.5)
RH IG SCN BLD-IMP: POSITIVE — SIGNIFICANT CHANGE UP
SODIUM SERPL-SCNC: 137 MMOL/L — SIGNIFICANT CHANGE UP (ref 135–145)
SP GR SPEC: 1.01 — LOW (ref 1.01–1.02)
TRIGL SERPL-MCNC: 268 MG/DL — HIGH (ref 10–149)
UROBILINOGEN FLD QL: SIGNIFICANT CHANGE UP
WBC # BLD: 5.5 K/UL — SIGNIFICANT CHANGE UP (ref 3.8–10.5)
WBC # FLD AUTO: 5.5 K/UL — SIGNIFICANT CHANGE UP (ref 3.8–10.5)

## 2019-11-26 PROCEDURE — 88342 IMHCHEM/IMCYTCHM 1ST ANTB: CPT | Mod: 26,59

## 2019-11-26 PROCEDURE — 88305 TISSUE EXAM BY PATHOLOGIST: CPT | Mod: 26

## 2019-11-26 PROCEDURE — 44120 REMOVAL OF SMALL INTESTINE: CPT

## 2019-11-26 PROCEDURE — 88341 IMHCHEM/IMCYTCHM EA ADD ANTB: CPT | Mod: 26,59

## 2019-11-26 PROCEDURE — 88307 TISSUE EXAM BY PATHOLOGIST: CPT | Mod: 26

## 2019-11-26 PROCEDURE — 88360 TUMOR IMMUNOHISTOCHEM/MANUAL: CPT | Mod: 26

## 2019-11-26 PROCEDURE — 99232 SBSQ HOSP IP/OBS MODERATE 35: CPT

## 2019-11-26 PROCEDURE — 52005 CYSTO W/URTRL CATHJ: CPT

## 2019-11-26 PROCEDURE — 44955 APPENDECTOMY ADD-ON: CPT

## 2019-11-26 RX ORDER — CHLORHEXIDINE GLUCONATE 213 G/1000ML
1 SOLUTION TOPICAL DAILY
Refills: 0 | Status: DISCONTINUED | OUTPATIENT
Start: 2019-11-26 | End: 2019-11-30

## 2019-11-26 RX ORDER — ENOXAPARIN SODIUM 100 MG/ML
40 INJECTION SUBCUTANEOUS DAILY
Refills: 0 | Status: DISCONTINUED | OUTPATIENT
Start: 2019-11-27 | End: 2019-11-30

## 2019-11-26 RX ORDER — MORPHINE SULFATE 50 MG/1
2 CAPSULE, EXTENDED RELEASE ORAL EVERY 4 HOURS
Refills: 0 | Status: DISCONTINUED | OUTPATIENT
Start: 2019-11-26 | End: 2019-11-27

## 2019-11-26 RX ORDER — MORPHINE SULFATE 50 MG/1
0.15 CAPSULE, EXTENDED RELEASE ORAL ONCE
Refills: 0 | Status: DISCONTINUED | OUTPATIENT
Start: 2019-11-26 | End: 2019-11-27

## 2019-11-26 RX ORDER — ONDANSETRON 8 MG/1
4 TABLET, FILM COATED ORAL EVERY 6 HOURS
Refills: 0 | Status: DISCONTINUED | OUTPATIENT
Start: 2019-11-26 | End: 2019-11-26

## 2019-11-26 RX ORDER — ELECTROLYTE SOLUTION,INJ
1 VIAL (ML) INTRAVENOUS
Refills: 0 | Status: DISCONTINUED | OUTPATIENT
Start: 2019-11-26 | End: 2019-11-26

## 2019-11-26 RX ORDER — NALOXONE HYDROCHLORIDE 4 MG/.1ML
0.1 SPRAY NASAL
Refills: 0 | Status: DISCONTINUED | OUTPATIENT
Start: 2019-11-26 | End: 2019-11-27

## 2019-11-26 RX ORDER — I.V. FAT EMULSION 20 G/100ML
8.3 EMULSION INTRAVENOUS
Qty: 20 | Refills: 0 | Status: DISCONTINUED | OUTPATIENT
Start: 2019-11-26 | End: 2019-11-27

## 2019-11-26 RX ORDER — ONDANSETRON 8 MG/1
4 TABLET, FILM COATED ORAL EVERY 6 HOURS
Refills: 0 | Status: DISCONTINUED | OUTPATIENT
Start: 2019-11-26 | End: 2019-11-30

## 2019-11-26 RX ORDER — KETOROLAC TROMETHAMINE 30 MG/ML
15 SYRINGE (ML) INJECTION EVERY 6 HOURS
Refills: 0 | Status: DISCONTINUED | OUTPATIENT
Start: 2019-11-26 | End: 2019-11-27

## 2019-11-26 RX ORDER — METOPROLOL TARTRATE 50 MG
5 TABLET ORAL EVERY 6 HOURS
Refills: 0 | Status: DISCONTINUED | OUTPATIENT
Start: 2019-11-26 | End: 2019-11-30

## 2019-11-26 RX ORDER — LEVOTHYROXINE SODIUM 125 MCG
50 TABLET ORAL AT BEDTIME
Refills: 0 | Status: DISCONTINUED | OUTPATIENT
Start: 2019-11-26 | End: 2019-11-30

## 2019-11-26 RX ORDER — SODIUM CHLORIDE 9 MG/ML
1000 INJECTION, SOLUTION INTRAVENOUS
Refills: 0 | Status: DISCONTINUED | OUTPATIENT
Start: 2019-11-26 | End: 2019-11-26

## 2019-11-26 RX ORDER — OXYCODONE HYDROCHLORIDE 5 MG/1
10 TABLET ORAL
Refills: 0 | Status: DISCONTINUED | OUTPATIENT
Start: 2019-11-26 | End: 2019-11-26

## 2019-11-26 RX ORDER — PANTOPRAZOLE SODIUM 20 MG/1
40 TABLET, DELAYED RELEASE ORAL DAILY
Refills: 0 | Status: DISCONTINUED | OUTPATIENT
Start: 2019-11-26 | End: 2019-11-30

## 2019-11-26 RX ORDER — OXYCODONE HYDROCHLORIDE 5 MG/1
5 TABLET ORAL
Refills: 0 | Status: DISCONTINUED | OUTPATIENT
Start: 2019-11-26 | End: 2019-11-26

## 2019-11-26 RX ORDER — ACETAMINOPHEN 500 MG
1000 TABLET ORAL EVERY 6 HOURS
Refills: 0 | Status: COMPLETED | OUTPATIENT
Start: 2019-11-26 | End: 2019-11-27

## 2019-11-26 RX ADMIN — Medication 1000 MILLIGRAM(S): at 17:56

## 2019-11-26 RX ADMIN — I.V. FAT EMULSION 8.3 ML/HR: 20 EMULSION INTRAVENOUS at 17:25

## 2019-11-26 RX ADMIN — MORPHINE SULFATE 2 MILLIGRAM(S): 50 CAPSULE, EXTENDED RELEASE ORAL at 13:00

## 2019-11-26 RX ADMIN — Medication 1 EACH: at 17:25

## 2019-11-26 RX ADMIN — Medication 0.6 MILLIGRAM(S): at 06:18

## 2019-11-26 RX ADMIN — Medication 5 MILLIGRAM(S): at 06:18

## 2019-11-26 RX ADMIN — Medication 15 MILLIGRAM(S): at 13:15

## 2019-11-26 RX ADMIN — Medication 400 MILLIGRAM(S): at 17:26

## 2019-11-26 RX ADMIN — Medication 15 MILLIGRAM(S): at 13:05

## 2019-11-26 RX ADMIN — MORPHINE SULFATE 2 MILLIGRAM(S): 50 CAPSULE, EXTENDED RELEASE ORAL at 12:45

## 2019-11-26 NOTE — ED ADULT NURSE NOTE - CHPI ED NUR TIMING2
Vaccine Information Statement(s) was given today. This has been reviewed, questions answered, and verbal consent given by Patient for injection(s) and administration of Influenza (Inactivated).    1. Does the patient have a moderate to severe fever?  No  2. Has the patient had a serious reaction to a flu shot before?   No  3. Has the patient ever had Guillian Monteagle Syndrome within 6 weeks of a previous flu shot?  No  4. Is the patient less than 6 months of age?  No    Patient is eligible to receive the vaccine based on all questions being answered as 'No'.    Patient tolerated without incident. See immunization grid for documentation.         sudden onset

## 2019-11-26 NOTE — PROGRESS NOTE ADULT - ASSESSMENT
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 now with RBF, awaiting surgery today    Plan:  - OR today for open SBR  - Keep TPN running while in OR  - NPO/IVF  - Home allopurinol  - f/u hematology plan regarding B cell lymphoma and current chemotherapy: no chemo while in hospital    Red Team Surgery  [id number]

## 2019-11-26 NOTE — CHART NOTE - NSCHARTNOTEFT_GEN_A_CORE
RED SURGERY POST-OP NOTE    PROCEDURE: Ex-Lap, PRINCE, SBR, appendectomy      SUBJECTIVE/ROS: Patient seen and examined at bedside.  Patient feels well, his pain is well controlled.  He denies nausea/vomiting, chest pain, SOB and has been making urine in cam bag.      MEDICATIONS  (STANDING):  acetaminophen  IVPB .. 1000 milliGRAM(s) IV Intermittent every 6 hours  chlorhexidine 2% Cloths 1 Application(s) Topical daily  fat emulsion (Fish Oil and Plant Based) 20% Infusion 8.3 mL/Hr (8.3 mL/Hr) IV Continuous <Continuous>  influenza   Vaccine 0.5 milliLiter(s) IntraMuscular once  ketorolac   Injectable 15 milliGRAM(s) IV Push every 6 hours  morphine PF Spinal 0.15 milliGRAM(s) IntraThecal. once  Parenteral Nutrition - Adult 1 Each (83 mL/Hr) TPN Continuous <Continuous>  Parenteral Nutrition - Adult 1 Each (83 mL/Hr) TPN Continuous <Continuous>    MEDICATIONS  (PRN):  morphine  - Injectable 2 milliGRAM(s) IV Push every 4 hours PRN Severe Pain (7 - 10)  naloxone Injectable 0.1 milliGRAM(s) IV Push every 3 minutes PRN For ANY of the following changes in patient status:  A. RR LESS THAN 10 breaths per minute, B. Oxygen saturation LESS THAN 90%, C. Sedation score of 6  ondansetron Injectable 4 milliGRAM(s) IV Push every 6 hours PRN Nausea      OBJECTIVE:    Vital Signs Last 24 Hrs  T(C): 36.4 (2019 15:19), Max: 36.9 (2019 00:46)  T(F): 97.6 (2019 15:19), Max: 98.4 (2019 00:46)  HR: 71 (2019 15:19) (58 - 86)  BP: 114/69 (2019 15:19) (97/54 - 150/75)  BP(mean): 71 (2019 14:00) (71 - 89)  RR: 20 (2019 15:19) (14 - 20)  SpO2: 98% (2019 15:19) (96% - 100%)        I&O's Detail    2019 07:01  -  2019 07:00  --------------------------------------------------------  IN:    fat emulsion (Fish Oil and Plant Based) 20% Infusion: 99.6 mL    Oral Fluid: 980 mL    TPN (Total Parenteral Nutrition): 996 mL  Total IN: 2075.6 mL    OUT:    Voided: 1900 mL  Total OUT: 1900 mL    Total NET: 175.6 mL      2019 07:01  -  2019 16:08  --------------------------------------------------------  IN:    Oral Fluid: 120 mL    TPN (Total Parenteral Nutrition): 83 mL  Total IN: 203 mL    OUT:    Indwelling Catheter - Urethral: 175 mL  Total OUT: 175 mL    Total NET: 28 mL          Daily Height in cm: 162.56 (2019 08:30)    Daily     LABS:                        9.6    5.50  )-----------( 231      ( 2019 07:48 )             30.3     -    137  |  102  |  21  ----------------------------<  174<H>  4.2   |  25  |  0.87    Ca    9.0      2019 05:10  Phos  3.9       Mg     2.2         TPro  6.0  /  Alb  3.6  /  TBili  0.2  /  DBili  x   /  AST  14  /  ALT  25  /  AlkPhos  84      PT/INR - ( 2019 07:29 )   PT: 12.1 sec;   INR: 1.05 ratio         PTT - ( 2019 07:29 )  PTT:31.1 sec  Urinalysis Basic - ( 2019 00:31 )    Color: Colorless / Appearance: Clear / S.006 / pH: x  Gluc: x / Ketone: Negative  / Bili: Negative / Urobili: <2 mg/dL   Blood: x / Protein: Negative / Nitrite: Negative   Leuk Esterase: Negative / RBC: x / WBC x   Sq Epi: x / Non Sq Epi: x / Bacteria: x                PHYSICAL EXAM:  Constitutional: well developed, well nourished, NAD  Respiratory: Saturating well on NC, No increased WOB  Abdomen: soft, non-distended appropriate tenderness.  Midline dressing mildly saturated with serosanguineous drainage, dry and intact.  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 He is now several hours post op from an Ex-lap, PRINCE, SBR, appendectomy, recovering well on the surgical floor.      - Pain control  - c/w TPN  - NPO with sips  - d/c Ucath tomorrow AM  - Await GI function  - f/u AM labs  - DVT ppx    RED SURGERY [id number]

## 2019-11-26 NOTE — PROGRESS NOTE ADULT - SUBJECTIVE AND OBJECTIVE BOX
[facility] NUTRITION SUPPORT / TPN -- FOLLOW UP NOTE  --------------------------------------------------------------------------------    24 hour events/subjective:  POD#0 s/p Ex Lap, PRINCE, SBR, and Appy- pain managed   NO N/V/D  tolerating TPN- no complaints  some gas no BM  No cp/palp/sob/dyspnea  no f/c/s    Diet:  Diet, NPO:   Except Medications  With Ice Chips/Sips of Water (11-26-19 @ 12:22)      Appetite: [ x ]Poor [  ]Adequate [  ]Good  Caloric intake:  [   x]  Adequate   [   ] Inadequate    ROS: General/ GI see HPI  all other systems negative    ALLERGIES & MEDICATIONS  --------------------------------------------------------------------------------  No Known Allergies    STANDING INPATIENT MEDICATIONS    acetaminophen  IVPB .. 1000 milliGRAM(s) IV Intermittent every 6 hours  chlorhexidine 2% Cloths 1 Application(s) Topical daily  fat emulsion (Fish Oil and Plant Based) 20% Infusion 8.3 mL/Hr IV Continuous <Continuous>  influenza   Vaccine 0.5 milliLiter(s) IntraMuscular once  ketorolac   Injectable 15 milliGRAM(s) IV Push every 6 hours  morphine PF Spinal 0.15 milliGRAM(s) IntraThecal. once  Parenteral Nutrition - Adult 1 Each TPN Continuous <Continuous>  Parenteral Nutrition - Adult 1 Each TPN Continuous <Continuous>      PRN INPATIENT MEDICATION  morphine  - Injectable 2 milliGRAM(s) IV Push every 4 hours PRN  naloxone Injectable 0.1 milliGRAM(s) IV Push every 3 minutes PRN  ondansetron Injectable 4 milliGRAM(s) IV Push every 6 hours PRN        VITALS/PHYSICAL EXAM  --------------------------------------------------------------------------------  T(C): 36.4 (11-26-19 @ 15:19), Max: 36.9 (11-26-19 @ 00:46)  HR: 71 (11-26-19 @ 15:19) (58 - 86)  BP: 114/69 (11-26-19 @ 15:19) (97/54 - 150/75)  RR: 20 (11-26-19 @ 15:19) (14 - 20)  SpO2: 98% (11-26-19 @ 15:19) (96% - 100%)  Wt(kg): --  Height (cm): 162.56 (11-26-19 @ 08:30)  Weight (kg): 64.9 (11-26-19 @ 08:30)  BMI (kg/m2): 24.6 (11-26-19 @ 08:30)  BSA (m2): 1.7 (11-26-19 @ 08:30)      11-25-19 @ 07:01  -  11-26-19 @ 07:00  --------------------------------------------------------  IN: 2075.6 mL / OUT: 1900 mL / NET: 175.6 mL    11-26-19 @ 07:01  -  11-26-19 @ 15:27  --------------------------------------------------------  IN: 203 mL / OUT: 175 mL / NET: 28 mL    PHYSICAL EXAM  --------------------------------------------------------------------------------  	Gen: NAD, A&Ox3. WN/WD/WG  	HEENT: NC/AT. PERRL, supple neck, clear oropharynx, mucosa moist  	GI: No BS, softly distended, appropriately tender                  dressing c/d/i              MSK; FROMx4, no deformities              Vascular: Warm, no clubbing, cyanosis, nor edema                   LUE PICC dressing c/d/i  	Neuro: No focal deficits, sensation & strength grossly intact  	Psych: Normal affect and mood  	Skin: Warm, without rashes, good turgor  	        LABS/ CULTURES/ RADIOLOGY:              9.6    5.50  >-----------<  231      [11-26-19 @ 07:48]              30.3     137  |  102  |  21  ----------------------------<  174      [11-26-19 @ 05:10]  4.2   |  25  |  0.87        Ca     9.0     [11-26-19 @ 05:10]      iCa    1.25     [11-26 @ 05:25]      Mg     2.2     [11-26-19 @ 05:10]      Phos  3.9     [11-26-19 @ 05:10]    TPro  6.0  /  Alb  3.6  /  TBili  0.2  /  DBili  x   /  AST  14  /  ALT  25  /  AlkPhos  84  [11-26-19 @ 05:10]    PT/INR: PT 12.1 , INR 1.05       [11-26-19 @ 07:29]  PTT: 31.1       [11-26-19 @ 07:29]    CAPILLARY BLOOD GLUCOSE  POCT Blood Glucose.: 115 mg/dL (26 Nov 2019 00:28)    Prealbumin, Serum: 22 mg/dL (11-26-19 @ 07:29)  Prealbumin, Serum: 21 mg/dL (11-22-19 @ 08:19)    Triglycerides, Serum: 268 mg/dL (11.26.19 @ 05:10)  Triglycerides, Serum: 254 mg/dL (11.25.19 @ 05:01)  Triglycerides, Serum: 181 mg/dL (11.24.19 @ 04:45)  Triglycerides, Serum: 153 mg/dL (11.23.19 @ 06:10)

## 2019-11-26 NOTE — BRIEF OPERATIVE NOTE - NSICDXBRIEFPREOP_GEN_ALL_CORE_FT
PRE-OP DIAGNOSIS:  Encounter for ureteral catheter placement 26-Nov-2019 11:51:52  Chase Nair
PRE-OP DIAGNOSIS:  Lymphoma 26-Nov-2019 11:50:17  Shraddha Jones  Small bowel obstruction 26-Nov-2019 11:50:08  Shraddha Jones

## 2019-11-26 NOTE — PROGRESS NOTE ADULT - SUBJECTIVE AND OBJECTIVE BOX
RED SURGERY DAILY PROGRESS NOTE:       SUBJECTIVE/ROS: Patient seen and examined at bedside.  No acute events overnight. Patient feels well, is having GI function and NPO for OR today.      MEDICATIONS  (STANDING):  allopurinol 100 milliGRAM(s) Oral daily  chlorhexidine 2% Cloths 1 Application(s) Topical daily  chlorhexidine 4% Liquid 1 Application(s) Topical <User Schedule>  colchicine 0.6 milliGRAM(s) Oral two times a day  enoxaparin Injectable 40 milliGRAM(s) SubCutaneous daily  influenza   Vaccine 0.5 milliLiter(s) IntraMuscular once  levothyroxine Injectable 50 MICROGram(s) IV Push at bedtime  metoprolol tartrate Injectable 5 milliGRAM(s) IV Push every 6 hours  pantoprazole  Injectable 40 milliGRAM(s) IV Push daily  Parenteral Nutrition - Adult 1 Each (83 mL/Hr) TPN Continuous <Continuous>    MEDICATIONS  (PRN):  acetaminophen   Tablet .. 650 milliGRAM(s) Oral every 6 hours PRN Moderate Pain (4 - 6)  benzocaine 15 mG/menthol 3.6 mG Lozenge 1 Lozenge Oral three times a day PRN Sore Throat  sodium chloride 0.9% lock flush 10 milliLiter(s) IV Push every 1 hour PRN Pre/post blood products, medications, blood draw, and to maintain line patency  tetracaine/benzocaine/butamben Spray 1 Spray(s) Topical every 4 hours PRN sore throat      OBJECTIVE:    Vital Signs Last 24 Hrs  T(C): 36.5 (2019 06:13), Max: 36.9 (2019 14:32)  T(F): 97.7 (2019 06:13), Max: 98.5 (2019 14:32)  HR: 64 (2019 06:13) (58 - 86)  BP: 146/83 (2019 06:13) (125/78 - 150/75)  BP(mean): --  RR: 18 (2019 06:13) (18 - 18)  SpO2: 96% (2019 06:13) (96% - 100%)        I&O's Detail    2019 07:01  -  2019 07:00  --------------------------------------------------------  IN:    fat emulsion (Fish Oil and Plant Based) 20% Infusion: 99.6 mL    Oral Fluid: 980 mL    TPN (Total Parenteral Nutrition): 996 mL  Total IN: 2075.6 mL    OUT:    Voided: 1900 mL  Total OUT: 1900 mL    Total NET: 175.6 mL          Daily     Daily     LABS:                        9.8    5.01  )-----------( 238      ( 2019 07:51 )             31.6         137  |  102  |  21  ----------------------------<  174<H>  4.2   |  25  |  0.87    Ca    9.0      2019 05:10  Phos  3.9       Mg     2.2         TPro  6.0  /  Alb  3.6  /  TBili  0.2  /  DBili  x   /  AST  14  /  ALT  25  /  AlkPhos  84        Urinalysis Basic - ( 2019 00:31 )    Color: Colorless / Appearance: Clear / S.006 / pH: x  Gluc: x / Ketone: Negative  / Bili: Negative / Urobili: <2 mg/dL   Blood: x / Protein: Negative / Nitrite: Negative   Leuk Esterase: Negative / RBC: x / WBC x   Sq Epi: x / Non Sq Epi: x / Bacteria: x                PHYSICAL EXAM:  Constitutional: well developed, well nourished, NAD  NEURO: AAOx3, no focal deficits  PULM: symmetric chest rise bilaterally, no increased WOB  ABD: soft, nontender, mildly distended  EXTR: no cyanosis or edema, moving all extremities

## 2019-11-26 NOTE — PROGRESS NOTE ADULT - ASSESSMENT
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    Pt remains NPO w/ NGT;  TPN consulted for Pt w/ Protein-Calorie Malnutrition    TPN tolerated at GOAL AA & Dextrose & SMOF 20g 2/2 TG remain elevated    TPN recommendations as appropriate:   105 grams amino acids (700ml 15% a.a.)  280 grams dextrose (400ml 70% dex)  35 grams SMOFlipid (1751ml 20%IVFE @ 14.6ml/hr x 12 hours)    Total Calories: 1722 vicky/day (27cal/Kg per dosing wt 64.9Kg)  Total Protein: 105 Gm/day (1.6Gm/Kg per dosing wt 64.9Kg)  Micronutrients: 10ml MVI, 3ml MTE-5    Non-Protein Calories: 1302 vicky/day (20cal/Kg)  Dextrose Infusion Rate: 3 mg/Kg/min  Lipid Infusion Rate: 0.54 Gm/Kg/day; 0.04 Gm/Kg/hr      1. Dedicated Central line for TPN maintanence as per protocol  2. Strict Intake and Output.  3. Weights three times a week  4. Monitor BMP, Mg, Ionized Ca, Phosphorus daily  5. Check Triglycerides daily for first 3 days of TPN, then monthly   6. Pre-albumin weekly.  7. Fingersticks to monitor glucose every 6 hours until stable then may be decreased to twice a day,           coverage with ISS - to be ordered by primary team           Elevated serum glucose- HgA1c w/ am labs noted continue to monitor  8. Volume depletion - improving -Initiate TPN w/           NaCl 40mEq, NaAce 60mEq, NaPhos 30mMol, KCl 80mEq           HyperCa resolved w/ hydration - 10mEq Ca and 12mEqMg  9. Hem/ Onc appreciated  Continue as per Surgery, will follow with you, D/w primary team    Kendy Duque PA-C  TPN team, pager [phone]  D/w Corey Barber and HILDA Montes De Oca

## 2019-11-26 NOTE — BRIEF OPERATIVE NOTE - OPERATION/FINDINGS
2 segments of proximal small bowel tethered to the retroperitoneum with involvement of the tip of the appendix, no obvious residual mass or lymphadenopathy

## 2019-11-26 NOTE — BRIEF OPERATIVE NOTE - NSICDXBRIEFPROCEDURE_GEN_ALL_CORE_FT
PROCEDURES:  Small bowel resection with anastomosis 26-Nov-2019 11:49:55  Shraddha Jones  Open lysis of abdominal adhesions 26-Nov-2019 11:49:48  Shraddha Jones  Exploratory laparotomy 26-Nov-2019 11:49:03  Shraddha Jones PROCEDURES:  Open appendectomy 26-Nov-2019 12:28:32  Shraddha Jones  Small bowel resection with anastomosis 26-Nov-2019 11:49:55  Shraddha Jones  Open lysis of abdominal adhesions 26-Nov-2019 11:49:48  Shraddha Jones  Exploratory laparotomy 26-Nov-2019 11:49:03  Shraddha Jones

## 2019-11-26 NOTE — BRIEF OPERATIVE NOTE - NSICDXBRIEFPOSTOP_GEN_ALL_CORE_FT
POST-OP DIAGNOSIS:  Encounter for ureteral catheter placement 26-Nov-2019 11:52:46  Chase Nair
POST-OP DIAGNOSIS:  Lymphoma 26-Nov-2019 11:50:40  Shraddha Jones  Small bowel obstruction 26-Nov-2019 11:50:29  Shraddha Jones

## 2019-11-27 ENCOUNTER — APPOINTMENT (OUTPATIENT)
Dept: HEMATOLOGY ONCOLOGY | Facility: CLINIC | Age: 69
End: 2019-11-27

## 2019-11-27 LAB
ALBUMIN SERPL ELPH-MCNC: 3.3 G/DL — SIGNIFICANT CHANGE UP (ref 3.3–5)
ALP SERPL-CCNC: 80 U/L — SIGNIFICANT CHANGE UP (ref 40–120)
ALT FLD-CCNC: 21 U/L — SIGNIFICANT CHANGE UP (ref 10–45)
ANION GAP SERPL CALC-SCNC: 7 MMOL/L — SIGNIFICANT CHANGE UP (ref 5–17)
ANISOCYTOSIS BLD QL: SLIGHT — SIGNIFICANT CHANGE UP
AST SERPL-CCNC: 13 U/L — SIGNIFICANT CHANGE UP (ref 10–40)
BASOPHILS # BLD AUTO: 0.03 K/UL — SIGNIFICANT CHANGE UP (ref 0–0.2)
BASOPHILS NFR BLD AUTO: 0.2 % — SIGNIFICANT CHANGE UP (ref 0–2)
BILIRUB SERPL-MCNC: 0.2 MG/DL — SIGNIFICANT CHANGE UP (ref 0.2–1.2)
BUN SERPL-MCNC: 21 MG/DL — SIGNIFICANT CHANGE UP (ref 7–23)
CA-I BLD-SCNC: 1.18 MMOL/L — SIGNIFICANT CHANGE UP (ref 1.12–1.3)
CALCIUM SERPL-MCNC: 8.7 MG/DL — SIGNIFICANT CHANGE UP (ref 8.4–10.5)
CHLORIDE SERPL-SCNC: 102 MMOL/L — SIGNIFICANT CHANGE UP (ref 96–108)
CO2 SERPL-SCNC: 26 MMOL/L — SIGNIFICANT CHANGE UP (ref 22–31)
CREAT SERPL-MCNC: 0.75 MG/DL — SIGNIFICANT CHANGE UP (ref 0.5–1.3)
ELLIPTOCYTES BLD QL SMEAR: SLIGHT — SIGNIFICANT CHANGE UP
EOSINOPHIL # BLD AUTO: 0.01 K/UL — SIGNIFICANT CHANGE UP (ref 0–0.5)
EOSINOPHIL NFR BLD AUTO: 0.1 % — SIGNIFICANT CHANGE UP (ref 0–6)
GLUCOSE SERPL-MCNC: 88 MG/DL — SIGNIFICANT CHANGE UP (ref 70–99)
HCT VFR BLD CALC: 30.4 % — LOW (ref 39–50)
HGB BLD-MCNC: 9.6 G/DL — LOW (ref 13–17)
IMM GRANULOCYTES NFR BLD AUTO: 0.5 % — SIGNIFICANT CHANGE UP (ref 0–1.5)
LYMPHOCYTES # BLD AUTO: 0.44 K/UL — LOW (ref 1–3.3)
LYMPHOCYTES # BLD AUTO: 3.4 % — LOW (ref 13–44)
MAGNESIUM SERPL-MCNC: 2.4 MG/DL — SIGNIFICANT CHANGE UP (ref 1.6–2.6)
MANUAL SMEAR VERIFICATION: SIGNIFICANT CHANGE UP
MCHC RBC-ENTMCNC: 26 PG — LOW (ref 27–34)
MCHC RBC-ENTMCNC: 31.6 GM/DL — LOW (ref 32–36)
MCV RBC AUTO: 82.4 FL — SIGNIFICANT CHANGE UP (ref 80–100)
MONOCYTES # BLD AUTO: 1.67 K/UL — HIGH (ref 0–0.9)
MONOCYTES NFR BLD AUTO: 12.8 % — SIGNIFICANT CHANGE UP (ref 2–14)
NEUTROPHILS # BLD AUTO: 10.82 K/UL — HIGH (ref 1.8–7.4)
NEUTROPHILS NFR BLD AUTO: 83 % — HIGH (ref 43–77)
PHOSPHATE SERPL-MCNC: 3.7 MG/DL — SIGNIFICANT CHANGE UP (ref 2.5–4.5)
PLAT MORPH BLD: NORMAL — SIGNIFICANT CHANGE UP
PLATELET # BLD AUTO: 207 K/UL — SIGNIFICANT CHANGE UP (ref 150–400)
POIKILOCYTOSIS BLD QL AUTO: SLIGHT — SIGNIFICANT CHANGE UP
POTASSIUM SERPL-MCNC: 4.5 MMOL/L — SIGNIFICANT CHANGE UP (ref 3.5–5.3)
POTASSIUM SERPL-SCNC: 4.5 MMOL/L — SIGNIFICANT CHANGE UP (ref 3.5–5.3)
PROT SERPL-MCNC: 5.6 G/DL — LOW (ref 6–8.3)
RBC # BLD: 3.69 M/UL — LOW (ref 4.2–5.8)
RBC # FLD: 24.1 % — HIGH (ref 10.3–14.5)
RBC BLD AUTO: ABNORMAL
SODIUM SERPL-SCNC: 135 MMOL/L — SIGNIFICANT CHANGE UP (ref 135–145)
TRIGL SERPL-MCNC: 193 MG/DL — HIGH (ref 10–149)
WBC # BLD: 13.04 K/UL — HIGH (ref 3.8–10.5)
WBC # FLD AUTO: 13.04 K/UL — HIGH (ref 3.8–10.5)

## 2019-11-27 PROCEDURE — 99232 SBSQ HOSP IP/OBS MODERATE 35: CPT

## 2019-11-27 RX ORDER — ELECTROLYTE SOLUTION,INJ
1 VIAL (ML) INTRAVENOUS
Refills: 0 | Status: DISCONTINUED | OUTPATIENT
Start: 2019-11-27 | End: 2019-11-27

## 2019-11-27 RX ORDER — OXYCODONE HYDROCHLORIDE 5 MG/1
5 TABLET ORAL EVERY 4 HOURS
Refills: 0 | Status: DISCONTINUED | OUTPATIENT
Start: 2019-11-27 | End: 2019-11-28

## 2019-11-27 RX ORDER — ACETAMINOPHEN 500 MG
650 TABLET ORAL EVERY 6 HOURS
Refills: 0 | Status: DISCONTINUED | OUTPATIENT
Start: 2019-11-27 | End: 2019-11-28

## 2019-11-27 RX ORDER — I.V. FAT EMULSION 20 G/100ML
8.3 EMULSION INTRAVENOUS
Qty: 20 | Refills: 0 | Status: DISCONTINUED | OUTPATIENT
Start: 2019-11-27 | End: 2019-11-28

## 2019-11-27 RX ADMIN — Medication 400 MILLIGRAM(S): at 00:04

## 2019-11-27 RX ADMIN — Medication 15 MILLIGRAM(S): at 16:16

## 2019-11-27 RX ADMIN — Medication 5 MILLIGRAM(S): at 17:35

## 2019-11-27 RX ADMIN — Medication 1000 MILLIGRAM(S): at 14:50

## 2019-11-27 RX ADMIN — Medication 400 MILLIGRAM(S): at 05:57

## 2019-11-27 RX ADMIN — I.V. FAT EMULSION 8.3 ML/HR: 20 EMULSION INTRAVENOUS at 17:35

## 2019-11-27 RX ADMIN — Medication 5 MILLIGRAM(S): at 11:51

## 2019-11-27 RX ADMIN — ENOXAPARIN SODIUM 40 MILLIGRAM(S): 100 INJECTION SUBCUTANEOUS at 11:50

## 2019-11-27 RX ADMIN — PANTOPRAZOLE SODIUM 40 MILLIGRAM(S): 20 TABLET, DELAYED RELEASE ORAL at 11:51

## 2019-11-27 RX ADMIN — Medication 1000 MILLIGRAM(S): at 06:27

## 2019-11-27 RX ADMIN — Medication 15 MILLIGRAM(S): at 16:49

## 2019-11-27 RX ADMIN — Medication 1 EACH: at 17:35

## 2019-11-27 RX ADMIN — Medication 50 MICROGRAM(S): at 00:04

## 2019-11-27 RX ADMIN — Medication 1000 MILLIGRAM(S): at 00:22

## 2019-11-27 RX ADMIN — CHLORHEXIDINE GLUCONATE 1 APPLICATION(S): 213 SOLUTION TOPICAL at 12:04

## 2019-11-27 RX ADMIN — Medication 400 MILLIGRAM(S): at 11:49

## 2019-11-27 NOTE — PROGRESS NOTE ADULT - ASSESSMENT
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 He is now s/p SBR and appendectomy 11/26, recovering appropriately on the floor.    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 He is now s/p SBR and appendectomy 11/26, recovering appropriately on the floor.    Plan:  - Start clear liquid diet   - IV tylenol/Toradol, morphine for breakthrough pain  - D/C Immanuel cam out this AM on rounds  - Encourage OOB and early ambulation  - Continue home medications, restart ASA POD 7  - F/u hematology, no chemotherapy while admitted  - Lovenox for dvt ppx      Red Team Surgery  [id number]

## 2019-11-27 NOTE — PROGRESS NOTE ADULT - ASSESSMENT
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    # SBO 2/2 jejunal mass  s/p small bowel resection and appendectomy on 11/26 by Dr. Cunha.   - Appreciate surg onc's care    # GCB type DLBCL  s/p 2 cycles RCHOP. Holding 3rd cycle (it was due 11/21). Dr. Kingston is aware  - F/U with Dr. 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    # SBO 2/2 jejunal mass  s/p small bowel resection and appendectomy on 11/26 by Dr. Cunha.   - Appreciate surg onc's care  -F/U surgical path    # GCB type DLBCL  s/p 2 cycles RCHOP. Holding 3rd cycle (it was due 11/21). Dr. Kingston is aware  - F/U with Dr. Kingston outpatient  -F/U surgical path    The case was discussed with Dr. Luiz Goetz MD, MPH  Hematology/Oncology Fellow  Pager: [phone]

## 2019-11-27 NOTE — PROGRESS NOTE ADULT - ASSESSMENT
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    Pt remains NPO w/ NGT;  TPN consulted for Pt w/ Protein-Calorie Malnutrition    TPN tolerated at GOAL AA & Dextrose & SMOF 20g   TPN recommendations as appropriate:   105 grams amino acids (700ml 15% a.a.)  280 grams dextrose (400ml 70% dex)  35 grams SMOFlipid (1751ml 20%IVFE @ 14.6ml/hr x 12 hours)    Total Calories: 1722 vicky/day (27cal/Kg per dosing wt 64.9Kg)  Total Protein: 105 Gm/day (1.6Gm/Kg per dosing wt 64.9Kg)  Micronutrients: 10ml MVI, 3ml MTE-5    Non-Protein Calories: 1302 vicky/day (20cal/Kg)  Dextrose Infusion Rate: 3 mg/Kg/min  Lipid Infusion Rate: 0.54 Gm/Kg/day; 0.04 Gm/Kg/hr      1. Dedicated Central line for TPN maintanence as per protocol  2. Strict Intake and Output.  3. Weights three times a week  4. Monitor BMP, Mg, Ionized Ca, Phosphorus daily  5. Check Triglycerides daily for first 3 days of TPN, then monthly   6. Pre-albumin weekly.  7. Fingersticks to monitor glucose every 6 hours until stable then may be decreased to twice a day,           coverage with ISS - to be ordered by primary team           Elevated serum glucose- HgA1c w/ am labs noted continue to monitor  8. Volume depletion - improving -Initiate TPN w/           NaCl 40mEq, NaAce 60mEq, NaPhos 30mMol, KCl 80mEq           HyperCa resolved w/ hydration - 10mEq Ca and 12mEqMg  9. Hem/ Onc appreciated  10. Hypertriglyceremia- stable with giving only 20g Lipid in TPN  Continue as per Surgery, will follow with you, D/w primary team    Kendy Duque PA-C  TPN team, pager [phone]  D/w Corey Montes De Oca

## 2019-11-27 NOTE — PROGRESS NOTE ADULT - SUBJECTIVE AND OBJECTIVE BOX
Hematology/Oncology Follow-up    INTERVAL HPI/OVERNIGHT EVENTS:  Had small bowel resection and appendectomy yesterday.     REVIEW OF SYSTEMS:  General: Negative for weakness, fatigue, malaise, chills, fever, night sweats, unintentional weight change.  Head: Negative for HA  Eyes: Negative for vision changes, diplopia, impaired vision.  ENT: Negative for tinnitus, vertigo, hearing impairment, postnasal drip, sore throat.  Respiratory: Negative for shortness of breath, cough, sputum.  Cardiovascular: Negative for chest pain, palpations, edema.  Gastrointestinal: Negative for dysphagia, nausea, vomiting, hematemesis, abdominal pain, diarrhea, constipation, hematochezia, tarry stool.  MSK: Negative for myalgia, joint pain, neck stiffness, weakness, back pain.  Neuro: Negative for weakness, loss of balance.   Skin: Negative for rash, erythema.  Psych: Negative for anxiety, depression, sleep disturbance.    VITAL SIGNS:  T(F): 97.5 (19 @ 05:11)  HR: 65 (19 @ 05:11)  BP: 115/73 (19 @ 05:11)  RR: 18 (19 @ 05:11)  SpO2: 98% (19 @ 05:30)  Wt(kg): --    19 @ 07:01  -  19 @ 07:00  --------------------------------------------------------  IN: 1482 mL / OUT: 1400 mL / NET: 82 mL    19 @ 07:01  -  19 @ 10:32  --------------------------------------------------------  IN: 0 mL / OUT: 525 mL / NET: -525 mL        PHYSICAL EXAM:  Constitutional: NAD   Eyes: PERRLA, EOMI, sclera non-icteric, conjunctiva non-anemia  Neck: supple, no masses, no elevated JVP  Mouth: No mucositis, no exudate, no ulcer.   Respiratory: CTA b/l  Cardiovascular: Normal rate regular rhythm. normal S1S2, no murmur  Gastrointestinal: soft and flat, no tenderness to palpation, no masses palpable, normoactive bowel soundS, no HSM  Extremities:  No c/c/e  MSK: No joint swelling, erythema, or tenderness   Neurological: AOx3, Cranial nerves II-XII grossly intact  Skin: No rash  Psych: Normal affect    MEDICATIONS  (STANDING):  acetaminophen  IVPB .. 1000 milliGRAM(s) IV Intermittent every 6 hours  chlorhexidine 2% Cloths 1 Application(s) Topical daily  enoxaparin Injectable 40 milliGRAM(s) SubCutaneous daily  fat emulsion (Fish Oil and Plant Based) 20% Infusion 8.3 mL/Hr (8.3 mL/Hr) IV Continuous <Continuous>  influenza   Vaccine 0.5 milliLiter(s) IntraMuscular once  ketorolac   Injectable 15 milliGRAM(s) IV Push every 6 hours  levothyroxine Injectable 50 MICROGram(s) IV Push at bedtime  metoprolol tartrate Injectable 5 milliGRAM(s) IV Push every 6 hours  pantoprazole  Injectable 40 milliGRAM(s) IV Push daily  Parenteral Nutrition - Adult 1 Each (83 mL/Hr) TPN Continuous <Continuous>    MEDICATIONS  (PRN):  morphine  - Injectable 2 milliGRAM(s) IV Push every 4 hours PRN Severe Pain (7 - 10)  naloxone Injectable 0.1 milliGRAM(s) IV Push every 3 minutes PRN For ANY of the following changes in patient status:  A. RR LESS THAN 10 breaths per minute, B. Oxygen saturation LESS THAN 90%, C. Sedation score of 6  ondansetron Injectable 4 milliGRAM(s) IV Push every 6 hours PRN Nausea      No Known Allergies      LABS:                        9.6    13.04 )-----------( 207      ( 2019 08:15 )             30.4     11-    135  |  102  |  21  ----------------------------<  88  4.5   |  26  |  0.75    Ca    8.7      2019 07:15  Phos  3.7       Mg     2.4         TPro  5.6<L>  /  Alb  3.3  /  TBili  0.2  /  DBili  x   /  AST  13  /  ALT  21  /  AlkPhos  80  11-27    PT/INR - ( 2019 07:29 )   PT: 12.1 sec;   INR: 1.05 ratio         PTT - ( 2019 07:29 )  PTT:31.1 sec   Urinalysis Basic - ( 2019 00:31 )    Color: Colorless / Appearance: Clear / S.006 / pH: x  Gluc: x / Ketone: Negative  / Bili: Negative / Urobili: <2 mg/dL   Blood: x / Protein: Negative / Nitrite: Negative   Leuk Esterase: Negative / RBC: x / WBC x   Sq Epi: x / Non Sq Epi: x / Bacteria: x        RADIOLOGY & ADDITIONAL TESTS:  Studies reviewed. Hematology/Oncology Follow-up    INTERVAL HPI/OVERNIGHT EVENTS:  Had small bowel resection and appendectomy yesterday.     REVIEW OF SYSTEMS:  General: Negative for weakness, fatigue, malaise, chills, fever, night sweats, unintentional weight change.  Head: Negative for HA  Eyes: Negative for vision changes, diplopia, impaired vision.  ENT: Negative for tinnitus, vertigo, hearing impairment, postnasal drip, sore throat.  Respiratory: Negative for shortness of breath, cough, sputum.  Cardiovascular: Negative for chest pain, palpations, edema.  Gastrointestinal: POSITIVE FOR surgical site pain, Negative for dysphagia, nausea, vomiting, hematemesis.  MSK: Negative for myalgia, joint pain, neck stiffness, weakness, back pain.  Neuro: Negative for weakness, loss of balance.   Skin: Negative for rash, erythema.  Psych: Negative for anxiety, depression, sleep disturbance.    VITAL SIGNS:  T(F): 97.5 (19 @ 05:11)  HR: 65 (19 @ 05:11)  BP: 115/73 (19 @ 05:11)  RR: 18 (19 @ 05:11)  SpO2: 98% (19 @ 05:30)  Wt(kg): --    19 @ 07:01  -  19 @ 07:00  --------------------------------------------------------  IN: 1482 mL / OUT: 1400 mL / NET: 82 mL    19 @ 07:01  -  19 @ 10:32  --------------------------------------------------------  IN: 0 mL / OUT: 525 mL / NET: -525 mL        PHYSICAL EXAM:  Constitutional: NAD   Eyes: sclera non-icteric, conjunctiva non-anemia  Neck: supple, no masses  Mouth: No mucositis, no exudate, no ulcer.   Respiratory: CTA b/l  Cardiovascular: Normal rate regular rhythm. no murmur  Gastrointestinal: soft and flat, decreased bowel sounds, surgical incision in middle lower abdomen covered by gauze  Extremities:  No c/c/e  MSK: No joint swelling, erythema, or tenderness   Neurological: AOx3, Cranial nerves II-XII grossly intact  Skin: No rash  Psych: Normal affect    MEDICATIONS  (STANDING):  acetaminophen  IVPB .. 1000 milliGRAM(s) IV Intermittent every 6 hours  chlorhexidine 2% Cloths 1 Application(s) Topical daily  enoxaparin Injectable 40 milliGRAM(s) SubCutaneous daily  fat emulsion (Fish Oil and Plant Based) 20% Infusion 8.3 mL/Hr (8.3 mL/Hr) IV Continuous <Continuous>  influenza   Vaccine 0.5 milliLiter(s) IntraMuscular once  ketorolac   Injectable 15 milliGRAM(s) IV Push every 6 hours  levothyroxine Injectable 50 MICROGram(s) IV Push at bedtime  metoprolol tartrate Injectable 5 milliGRAM(s) IV Push every 6 hours  pantoprazole  Injectable 40 milliGRAM(s) IV Push daily  Parenteral Nutrition - Adult 1 Each (83 mL/Hr) TPN Continuous <Continuous>    MEDICATIONS  (PRN):  morphine  - Injectable 2 milliGRAM(s) IV Push every 4 hours PRN Severe Pain (7 - 10)  naloxone Injectable 0.1 milliGRAM(s) IV Push every 3 minutes PRN For ANY of the following changes in patient status:  A. RR LESS THAN 10 breaths per minute, B. Oxygen saturation LESS THAN 90%, C. Sedation score of 6  ondansetron Injectable 4 milliGRAM(s) IV Push every 6 hours PRN Nausea      No Known Allergies      LABS:                        9.6    13.04 )-----------( 207      ( 2019 08:15 )             30.4         135  |  102  |  21  ----------------------------<  88  4.5   |  26  |  0.75    Ca    8.7      2019 07:15  Phos  3.7       Mg     2.4         TPro  5.6<L>  /  Alb  3.3  /  TBili  0.2  /  DBili  x   /  AST  13  /  ALT  21  /  AlkPhos  80      PT/INR - ( 2019 07:29 )   PT: 12.1 sec;   INR: 1.05 ratio         PTT - ( 2019 07:29 )  PTT:31.1 sec   Urinalysis Basic - ( 2019 00:31 )    Color: Colorless / Appearance: Clear / S.006 / pH: x  Gluc: x / Ketone: Negative  / Bili: Negative / Urobili: <2 mg/dL   Blood: x / Protein: Negative / Nitrite: Negative   Leuk Esterase: Negative / RBC: x / WBC x   Sq Epi: x / Non Sq Epi: x / Bacteria: x        RADIOLOGY & ADDITIONAL TESTS:  Studies reviewed. Hematology/Oncology Follow-up    INTERVAL HPI/OVERNIGHT EVENTS:  Had small bowel resection and appendectomy yesterday. Feels overall ok. Denies any abdominal pain. Not passing gas yet.     REVIEW OF SYSTEMS:  General: Negative for weakness, fatigue, malaise, chills, fever, night sweats, unintentional weight change.  Head: Negative for HA  Eyes: Negative for vision changes, diplopia, impaired vision.  ENT: Negative for tinnitus, vertigo, hearing impairment, postnasal drip, sore throat.  Respiratory: Negative for shortness of breath, cough, sputum.  Cardiovascular: Negative for chest pain, palpations, edema.  Gastrointestinal: POSITIVE FOR surgical site pain, Negative for dysphagia, nausea, vomiting, hematemesis.  MSK: Negative for myalgia, joint pain, neck stiffness, weakness, back pain.  Neuro: Negative for weakness, loss of balance.   Skin: Negative for rash, erythema.  Psych: Negative for anxiety, depression, sleep disturbance.    VITAL SIGNS:  T(F): 97.5 (19 @ 05:11)  HR: 65 (19 @ 05:11)  BP: 115/73 (19 @ 05:11)  RR: 18 (19 @ 05:11)  SpO2: 98% (19 @ 05:30)  Wt(kg): --    19 @ 07:01  -  19 @ 07:00  --------------------------------------------------------  IN: 1482 mL / OUT: 1400 mL / NET: 82 mL    19 @ 07:01  -  19 @ 10:32  --------------------------------------------------------  IN: 0 mL / OUT: 525 mL / NET: -525 mL        PHYSICAL EXAM:  Constitutional: NAD   Eyes: sclera non-icteric, conjunctiva non-anemia  Neck: supple, no masses  Mouth: No mucositis, no exudate, no ulcer.   Respiratory: CTA b/l  Cardiovascular: Normal rate regular rhythm. no murmur  Gastrointestinal: soft and flat, decreased bowel sounds, surgical incision in middle lower abdomen covered by gauze  Extremities:  No c/c/e  MSK: No joint swelling, erythema, or tenderness   Neurological: AOx3, Cranial nerves II-XII grossly intact  Skin: No rash  Psych: Normal affect    MEDICATIONS  (STANDING):  acetaminophen  IVPB .. 1000 milliGRAM(s) IV Intermittent every 6 hours  chlorhexidine 2% Cloths 1 Application(s) Topical daily  enoxaparin Injectable 40 milliGRAM(s) SubCutaneous daily  fat emulsion (Fish Oil and Plant Based) 20% Infusion 8.3 mL/Hr (8.3 mL/Hr) IV Continuous <Continuous>  influenza   Vaccine 0.5 milliLiter(s) IntraMuscular once  ketorolac   Injectable 15 milliGRAM(s) IV Push every 6 hours  levothyroxine Injectable 50 MICROGram(s) IV Push at bedtime  metoprolol tartrate Injectable 5 milliGRAM(s) IV Push every 6 hours  pantoprazole  Injectable 40 milliGRAM(s) IV Push daily  Parenteral Nutrition - Adult 1 Each (83 mL/Hr) TPN Continuous <Continuous>    MEDICATIONS  (PRN):  morphine  - Injectable 2 milliGRAM(s) IV Push every 4 hours PRN Severe Pain (7 - 10)  naloxone Injectable 0.1 milliGRAM(s) IV Push every 3 minutes PRN For ANY of the following changes in patient status:  A. RR LESS THAN 10 breaths per minute, B. Oxygen saturation LESS THAN 90%, C. Sedation score of 6  ondansetron Injectable 4 milliGRAM(s) IV Push every 6 hours PRN Nausea      No Known Allergies      LABS:                        9.6    13.04 )-----------( 207      ( 2019 08:15 )             30.4     11-    135  |  102  |  21  ----------------------------<  88  4.5   |  26  |  0.75    Ca    8.7      2019 07:15  Phos  3.7       Mg     2.4         TPro  5.6<L>  /  Alb  3.3  /  TBili  0.2  /  DBili  x   /  AST  13  /  ALT  21  /  AlkPhos  80  -    PT/INR - ( 2019 07:29 )   PT: 12.1 sec;   INR: 1.05 ratio         PTT - ( 2019 07:29 )  PTT:31.1 sec   Urinalysis Basic - ( 2019 00:31 )    Color: Colorless / Appearance: Clear / S.006 / pH: x  Gluc: x / Ketone: Negative  / Bili: Negative / Urobili: <2 mg/dL   Blood: x / Protein: Negative / Nitrite: Negative   Leuk Esterase: Negative / RBC: x / WBC x   Sq Epi: x / Non Sq Epi: x / Bacteria: x        RADIOLOGY & ADDITIONAL TESTS:  Studies reviewed.

## 2019-11-27 NOTE — PROGRESS NOTE ADULT - SUBJECTIVE AND OBJECTIVE BOX
Day 1 of Anesthesia Pain Management Service    SUBJECTIVE:  Pain Scale Score:          [X] Refer to charted pain scores    THERAPY:    s/p  150mcg PF morphine on 11/26/19        MEDICATIONS  (STANDING):  acetaminophen  IVPB .. 1000 milliGRAM(s) IV Intermittent every 6 hours  chlorhexidine 2% Cloths 1 Application(s) Topical daily  enoxaparin Injectable 40 milliGRAM(s) SubCutaneous daily  fat emulsion (Fish Oil and Plant Based) 20% Infusion 8.3 mL/Hr (8.3 mL/Hr) IV Continuous <Continuous>  influenza   Vaccine 0.5 milliLiter(s) IntraMuscular once  ketorolac   Injectable 15 milliGRAM(s) IV Push every 6 hours  levothyroxine Injectable 50 MICROGram(s) IV Push at bedtime  metoprolol tartrate Injectable 5 milliGRAM(s) IV Push every 6 hours  morphine PF Spinal 0.15 milliGRAM(s) IntraThecal. once  pantoprazole  Injectable 40 milliGRAM(s) IV Push daily  Parenteral Nutrition - Adult 1 Each (83 mL/Hr) TPN Continuous <Continuous>    MEDICATIONS  (PRN):  morphine  - Injectable 2 milliGRAM(s) IV Push every 4 hours PRN Severe Pain (7 - 10)  naloxone Injectable 0.1 milliGRAM(s) IV Push every 3 minutes PRN For ANY of the following changes in patient status:  A. RR LESS THAN 10 breaths per minute, B. Oxygen saturation LESS THAN 90%, C. Sedation score of 6  ondansetron Injectable 4 milliGRAM(s) IV Push every 6 hours PRN Nausea      OBJECTIVE:    Sedation:        	[X] Alert	 [ ] Drowsy	[ ] Arousable      [ ] Asleep       [ ] Unresponsive    Side Effects:	[X] None 	[ ] Nausea	[ ] Vomiting         [ ] Pruritus  		[ ] Weakness            [ ] Numbness	          [ ] Other:    Vital Signs Last 24 Hrs  T(C): 36.4 (27 Nov 2019 05:11), Max: 37.1 (26 Nov 2019 21:39)  T(F): 97.5 (27 Nov 2019 05:11), Max: 98.8 (26 Nov 2019 21:39)  HR: 65 (27 Nov 2019 05:11) (60 - 76)  BP: 115/73 (27 Nov 2019 05:11) (97/54 - 146/83)  BP(mean): 71 (26 Nov 2019 14:00) (71 - 89)  RR: 18 (27 Nov 2019 05:11) (14 - 20)  SpO2: 98% (27 Nov 2019 05:30) (96% - 100%)    ASSESSMENT/ PLAN: Pain controlled. Some discomfort from the cam.   [X] Transition to prn analgesics today  [X] Pain management per primary service, pain service to sign off   [X]Documentation and Verification of current medications

## 2019-11-27 NOTE — PROGRESS NOTE ADULT - SUBJECTIVE AND OBJECTIVE BOX
POD #: 1 s/p     No acute events overnight    SUBJECTIVE:  Reports pain  Denies nausea, vomiting, diarrhea  Is passing gas and having bowel movements  Tolerating diet  Ambulating independently    OBJECTIVE:  Vital Signs Last 24 Hrs  T(C): 36.4 (27 Nov 2019 05:11), Max: 37.1 (26 Nov 2019 21:39)  T(F): 97.5 (27 Nov 2019 05:11), Max: 98.8 (26 Nov 2019 21:39)  HR: 65 (27 Nov 2019 05:11) (60 - 76)  BP: 115/73 (27 Nov 2019 05:11) (97/54 - 146/83)  BP(mean): 71 (26 Nov 2019 14:00) (71 - 89)  RR: 18 (27 Nov 2019 05:11) (14 - 20)  SpO2: 98% (27 Nov 2019 05:30) (96% - 100%)      Physical Examination:  GEN: NAD, resting quietly  NEURO: AAOx3, CN II-XII grossly intact, no focal deficits  PULM: symmetric chest rise bilaterally, no increased WOB  ABD: soft, nontender, nondistended, incision CDI  EXTR: no cyanosis or edema, moving all extremities    LABS:                        9.6    5.50  )-----------( 231      ( 26 Nov 2019 07:48 )             30.3       11-26    137  |  102  |  21  ----------------------------<  174<H>  4.2   |  25  |  0.87    Ca    9.0      26 Nov 2019 05:10  Phos  3.9     11-26  Mg     2.2     11-26    TPro  6.0  /  Alb  3.6  /  TBili  0.2  /  DBili  x   /  AST  14  /  ALT  25  /  AlkPhos  84  11-26        IMAGING:  [] POD #: 1 s/p SBR with appendectomy    No acute events overnight    SUBJECTIVE:  Reports minimal incisional pain  Denies nausea, vomiting, diarrhea  Is not yet passing gas and having bowel movements, does feel rumbling  Ambulating independently    OBJECTIVE:  Vital Signs Last 24 Hrs  T(C): 36.4 (27 Nov 2019 05:11), Max: 37.1 (26 Nov 2019 21:39)  T(F): 97.5 (27 Nov 2019 05:11), Max: 98.8 (26 Nov 2019 21:39)  HR: 65 (27 Nov 2019 05:11) (60 - 76)  BP: 115/73 (27 Nov 2019 05:11) (97/54 - 146/83)  BP(mean): 71 (26 Nov 2019 14:00) (71 - 89)  RR: 18 (27 Nov 2019 05:11) (14 - 20)  SpO2: 98% (27 Nov 2019 05:30) (96% - 100%)      Physical Examination:  GEN: NAD, resting quietly  NEURO: AAOx3, CN II-XII grossly intact, no focal deficits  PULM: symmetric chest rise bilaterally, no increased WOB  ABD: soft, nontender, mildly distended, midline incision CDI, plain packing in between staples replaced.      LABS:                        9.6    5.50  )-----------( 231      ( 26 Nov 2019 07:48 )             30.3       11-26    137  |  102  |  21  ----------------------------<  174<H>  4.2   |  25  |  0.87    Ca    9.0      26 Nov 2019 05:10  Phos  3.9     11-26  Mg     2.2     11-26    TPro  6.0  /  Alb  3.6  /  TBili  0.2  /  DBili  x   /  AST  14  /  ALT  25  /  AlkPhos  84  11-26

## 2019-11-27 NOTE — CHART NOTE - NSCHARTNOTEFT_GEN_A_CORE
Hematology consulted for anemia, thrombocytopenia.   Smear showed occasional schistocytes (1/HPF), + matthew cells, Neutrophils with toxic granulation and grossly low platelet count with occasional giant platelets. No M proteion. No hemolysis, no DIC. No iron /b12, foalte def. Negative hepatitis. Normal K/L ratio. Anemia/thrombocytopenia is most likely 2/2 E.coli bacteremia and parainfluenza in addition to chronic anemia. Plts normalized.     We will be signing off the case.   Please do not hesitate to contact us for any questions.    Thank you very much for your consultation. error

## 2019-11-27 NOTE — PROGRESS NOTE ADULT - SUBJECTIVE AND OBJECTIVE BOX
RED SURGERY DAILY PROGRESS NOTE:     SUBJECTIVE:  Patient seen and examined at bedside. POD#1  No acute events overnight  Reports acid reflux and discomfort at site of the wound. No erythema or induration of wound.   No flatus, BMs, fever, chills, n/v  Ambulating independently.  Drinking water    OBJECTIVE:  Vital Signs Last 24 Hrs  T(C): 36.4 (27 Nov 2019 05:11), Max: 37.1 (26 Nov 2019 21:39)  T(F): 97.5 (27 Nov 2019 05:11), Max: 98.8 (26 Nov 2019 21:39)  HR: 65 (27 Nov 2019 05:11) (60 - 76)  BP: 115/73 (27 Nov 2019 05:11) (97/54 - 146/83)  BP(mean): 71 (26 Nov 2019 14:00) (71 - 89)  RR: 18 (27 Nov 2019 05:11) (14 - 20)  SpO2: 98% (27 Nov 2019 05:30) (96% - 100%)    24 hr urine output: 1.025L RED SURGERY DAILY PROGRESS NOTE:     SUBJECTIVE:  Patient seen and examined at bedside. POD#1  No acute events overnight  Reports acid reflux and discomfort at site of the wound. No erythema or induration of wound.   No flatus, BMs, fever, chills, n/v  Ambulating independently.  Drinking water    MEDICATIONS  (STANDING):  acetaminophen  IVPB .. 1000 milliGRAM(s) IV Intermittent every 6 hours  chlorhexidine 2% Cloths 1 Application(s) Topical daily  enoxaparin Injectable 40 milliGRAM(s) SubCutaneous daily  fat emulsion (Fish Oil and Plant Based) 20% Infusion 8.3 mL/Hr (8.3 mL/Hr) IV Continuous <Continuous>  influenza   Vaccine 0.5 milliLiter(s) IntraMuscular once  ketorolac   Injectable 15 milliGRAM(s) IV Push every 6 hours  levothyroxine Injectable 50 MICROGram(s) IV Push at bedtime  metoprolol tartrate Injectable 5 milliGRAM(s) IV Push every 6 hours  morphine PF Spinal 0.15 milliGRAM(s) IntraThecal. once  pantoprazole  Injectable 40 milliGRAM(s) IV Push daily  Parenteral Nutrition - Adult 1 Each (83 mL/Hr) TPN Continuous <Continuous>    MEDICATIONS  (PRN):  morphine  - Injectable 2 milliGRAM(s) IV Push every 4 hours PRN Severe Pain (7 - 10)  naloxone Injectable 0.1 milliGRAM(s) IV Push every 3 minutes PRN For ANY of the following changes in patient status:  A. RR LESS THAN 10 breaths per minute, B. Oxygen saturation LESS THAN 90%, C. Sedation score of 6  ondansetron Injectable 4 milliGRAM(s) IV Push every 6 hours PRN Nausea      OBJECTIVE:  Vital Signs Last 24 Hrs  T(C): 36.4 (27 Nov 2019 05:11), Max: 37.1 (26 Nov 2019 21:39)  T(F): 97.5 (27 Nov 2019 05:11), Max: 98.8 (26 Nov 2019 21:39)  HR: 65 (27 Nov 2019 05:11) (60 - 76)  BP: 115/73 (27 Nov 2019 05:11) (97/54 - 146/83)  BP(mean): 71 (26 Nov 2019 14:00) (71 - 89)  RR: 18 (27 Nov 2019 05:11) (14 - 20)  SpO2: 98% (27 Nov 2019 05:30) (96% - 100%)  I&O's Detail      24 hr urine output: 1.025L  7:00 urine output: 1.4L          PHYSICAL EXAM:  Constitutional: well developed, well nourished, NAD  NEURO: AAOx3, no focal deficits  PULM: symmetric chest rise bilaterally, no increased WOB  ABD: soft, nontender, mildly distended  EXTR: no cyanosis or edema, moving all extremities

## 2019-11-27 NOTE — PROGRESS NOTE ADULT - SUBJECTIVE AND OBJECTIVE BOX
[facility] NUTRITION SUPPORT / TPN -- FOLLOW UP NOTE  --------------------------------------------------------------------------------    24 hour events/subjective:  POD#1 s/p Ex Lap, PRINCE, SBR, and Appy- pain managed   NO N/V/D  tolerating TPN- no complaints  No gas no BM  ambulating without difficulty  No cp/palp/sob/dyspnea  no f/c/s    Diet:  Diet, NPO:   Except Medications  With Ice Chips/Sips of Water (11-26-19 @ 12:22)      Appetite: [ x ]Poor [  ]Adequate [  ]Good  Caloric intake:  [   x]  Adequate   [   ] Inadequate    ROS: General/ GI see HPI  all other systems negative    ALLERGIES & MEDICATIONS  --------------------------------------------------------------------------------  No Known Allergies      STANDING INPATIENT MEDICATIONS    chlorhexidine 2% Cloths 1 Application(s) Topical daily  enoxaparin Injectable 40 milliGRAM(s) SubCutaneous daily  fat emulsion (Fish Oil and Plant Based) 20% Infusion 8.3 mL/Hr IV Continuous <Continuous>  influenza   Vaccine 0.5 milliLiter(s) IntraMuscular once  levothyroxine Injectable 50 MICROGram(s) IV Push at bedtime  metoprolol tartrate Injectable 5 milliGRAM(s) IV Push every 6 hours  pantoprazole  Injectable 40 milliGRAM(s) IV Push daily  Parenteral Nutrition - Adult 1 Each TPN Continuous <Continuous>  Parenteral Nutrition - Adult 1 Each TPN Continuous <Continuous>      PRN INPATIENT MEDICATION  morphine  - Injectable 2 milliGRAM(s) IV Push every 4 hours PRN  naloxone Injectable 0.1 milliGRAM(s) IV Push every 3 minutes PRN  ondansetron Injectable 4 milliGRAM(s) IV Push every 6 hours PRN        VITALS/PHYSICAL EXAM  --------------------------------------------------------------------------------  T(C): 36.6 (11-27-19 @ 09:49), Max: 37.1 (11-26-19 @ 21:39)  HR: 72 (11-27-19 @ 11:55) (60 - 76)  BP: 130/78 (11-27-19 @ 11:55) (97/54 - 146/70)  RR: 18 (11-27-19 @ 09:49) (14 - 20)  SpO2: 97% (11-27-19 @ 09:49) (97% - 100%)  Wt(kg): --  Height (cm): 162.56 (11-26-19 @ 08:30)  Weight (kg): 64.9 (11-26-19 @ 08:30)  BMI (kg/m2): 24.6 (11-26-19 @ 08:30)  BSA (m2): 1.7 (11-26-19 @ 08:30)      11-26-19 @ 07:01  -  11-27-19 @ 07:00  --------------------------------------------------------  IN: 1482 mL / OUT: 1400 mL / NET: 82 mL    11-27-19 @ 07:01  -  11-27-19 @ 13:47  --------------------------------------------------------  IN: 0 mL / OUT: 525 mL / NET: -525 mL      PHYSICAL EXAM  --------------------------------------------------------------------------------  	Gen: NAD, A&Ox3. WN/WD/WG  	HEENT: NC/AT. PERRL, supple neck, clear oropharynx, mucosa moist  	GI: (+) BS, softly distended, appropriately tender                  dressing c/d/i              MSK; FROMx4, no deformities              Vascular: Warm, no clubbing, cyanosis, nor edema                   LUE PICC dressing c/d/i  	Neuro: No focal deficits, sensation & strength grossly intact  	Psych: Normal affect and mood  	Skin: Warm, without rashes, good turgor      LABS/ CULTURES/ RADIOLOGY:              9.6    13.04 >-----------<  207      [11-27-19 @ 08:15]              30.4     135  |  102  |  21  ----------------------------<  88      [11-27-19 @ 07:15]  4.5   |  26  |  0.75        Ca     8.7     [11-27-19 @ 07:15]      iCa    1.18     [11-27 @ 07:33]      Mg     2.4     [11-27-19 @ 07:15]      Phos  3.7     [11-27-19 @ 07:15]    TPro  5.6  /  Alb  3.3  /  TBili  0.2  /  DBili  x   /  AST  13  /  ALT  21  /  AlkPhos  80  [11-27-19 @ 07:15]    PT/INR: PT 12.1 , INR 1.05       [11-26-19 @ 07:29]  PTT: 31.1       [11-26-19 @ 07:29]      Prealbumin, Serum: 22 mg/dL (11-26-19 @ 07:29)  Prealbumin, Serum: 21 mg/dL (11-22-19 @ 08:19)      Triglycerides, Serum: 193 mg/dL (11.27.19 @ 07:15)  Triglycerides, Serum: 268 mg/dL (11.26.19 @ 05:10)  Triglycerides, Serum: 254 mg/dL (11.25.19 @ 05:01)  Triglycerides, Serum: 181 mg/dL (11.24.19 @ 04:45)  Triglycerides, Serum: 153 mg/dL (11.23.19 @ 06:10)

## 2019-11-27 NOTE — PROGRESS NOTE ADULT - ASSESSMENT
68yo M with B cell lymphoma, POD#1 open small bowel resection with primary anastomosis and appendectomy.     Plan:   -TPN + sips and chips  -Remove R Ucath and cam today  -C/w home meds, except ASA (resume 7 days post-op)  -Continue ambulation  -f/u hematology plan regarding B cell lymphoma and current chemotherapy: no chemo while in hospital

## 2019-11-28 LAB
ALBUMIN SERPL ELPH-MCNC: 3.3 G/DL — SIGNIFICANT CHANGE UP (ref 3.3–5)
ALP SERPL-CCNC: 74 U/L — SIGNIFICANT CHANGE UP (ref 40–120)
ALT FLD-CCNC: 21 U/L — SIGNIFICANT CHANGE UP (ref 10–45)
ANION GAP SERPL CALC-SCNC: 12 MMOL/L — SIGNIFICANT CHANGE UP (ref 5–17)
AST SERPL-CCNC: 16 U/L — SIGNIFICANT CHANGE UP (ref 10–40)
BASOPHILS # BLD AUTO: 0.05 K/UL — SIGNIFICANT CHANGE UP (ref 0–0.2)
BASOPHILS NFR BLD AUTO: 0.6 % — SIGNIFICANT CHANGE UP (ref 0–2)
BILIRUB SERPL-MCNC: 0.2 MG/DL — SIGNIFICANT CHANGE UP (ref 0.2–1.2)
BUN SERPL-MCNC: 19 MG/DL — SIGNIFICANT CHANGE UP (ref 7–23)
CA-I BLD-SCNC: 1.15 MMOL/L — SIGNIFICANT CHANGE UP (ref 1.12–1.3)
CALCIUM SERPL-MCNC: 8.4 MG/DL — SIGNIFICANT CHANGE UP (ref 8.4–10.5)
CHLORIDE SERPL-SCNC: 101 MMOL/L — SIGNIFICANT CHANGE UP (ref 96–108)
CO2 SERPL-SCNC: 25 MMOL/L — SIGNIFICANT CHANGE UP (ref 22–31)
CREAT SERPL-MCNC: 0.87 MG/DL — SIGNIFICANT CHANGE UP (ref 0.5–1.3)
EOSINOPHIL # BLD AUTO: 0.07 K/UL — SIGNIFICANT CHANGE UP (ref 0–0.5)
EOSINOPHIL NFR BLD AUTO: 0.8 % — SIGNIFICANT CHANGE UP (ref 0–6)
GLUCOSE SERPL-MCNC: 117 MG/DL — HIGH (ref 70–99)
HCT VFR BLD CALC: 27.1 % — LOW (ref 39–50)
HGB BLD-MCNC: 8.6 G/DL — LOW (ref 13–17)
IMM GRANULOCYTES NFR BLD AUTO: 0.7 % — SIGNIFICANT CHANGE UP (ref 0–1.5)
LYMPHOCYTES # BLD AUTO: 0.46 K/UL — LOW (ref 1–3.3)
LYMPHOCYTES # BLD AUTO: 5.2 % — LOW (ref 13–44)
MAGNESIUM SERPL-MCNC: 2 MG/DL — SIGNIFICANT CHANGE UP (ref 1.6–2.6)
MCHC RBC-ENTMCNC: 26.3 PG — LOW (ref 27–34)
MCHC RBC-ENTMCNC: 31.7 GM/DL — LOW (ref 32–36)
MCV RBC AUTO: 82.9 FL — SIGNIFICANT CHANGE UP (ref 80–100)
MONOCYTES # BLD AUTO: 1.15 K/UL — HIGH (ref 0–0.9)
MONOCYTES NFR BLD AUTO: 12.9 % — SIGNIFICANT CHANGE UP (ref 2–14)
NEUTROPHILS # BLD AUTO: 7.13 K/UL — SIGNIFICANT CHANGE UP (ref 1.8–7.4)
NEUTROPHILS NFR BLD AUTO: 79.8 % — HIGH (ref 43–77)
PHOSPHATE SERPL-MCNC: 3 MG/DL — SIGNIFICANT CHANGE UP (ref 2.5–4.5)
PLATELET # BLD AUTO: 197 K/UL — SIGNIFICANT CHANGE UP (ref 150–400)
POTASSIUM SERPL-MCNC: 4.2 MMOL/L — SIGNIFICANT CHANGE UP (ref 3.5–5.3)
POTASSIUM SERPL-SCNC: 4.2 MMOL/L — SIGNIFICANT CHANGE UP (ref 3.5–5.3)
PROT SERPL-MCNC: 5.4 G/DL — LOW (ref 6–8.3)
RBC # BLD: 3.27 M/UL — LOW (ref 4.2–5.8)
RBC # FLD: 24.6 % — HIGH (ref 10.3–14.5)
SODIUM SERPL-SCNC: 138 MMOL/L — SIGNIFICANT CHANGE UP (ref 135–145)
TRIGL SERPL-MCNC: 205 MG/DL — HIGH (ref 10–149)
WBC # BLD: 8.92 K/UL — SIGNIFICANT CHANGE UP (ref 3.8–10.5)
WBC # FLD AUTO: 8.92 K/UL — SIGNIFICANT CHANGE UP (ref 3.8–10.5)

## 2019-11-28 PROCEDURE — 99232 SBSQ HOSP IP/OBS MODERATE 35: CPT

## 2019-11-28 RX ORDER — ELECTROLYTE SOLUTION,INJ
1 VIAL (ML) INTRAVENOUS
Refills: 0 | Status: DISCONTINUED | OUTPATIENT
Start: 2019-11-28 | End: 2019-11-28

## 2019-11-28 RX ORDER — ACETAMINOPHEN 500 MG
650 TABLET ORAL EVERY 6 HOURS
Refills: 0 | Status: DISCONTINUED | OUTPATIENT
Start: 2019-11-28 | End: 2019-11-30

## 2019-11-28 RX ORDER — I.V. FAT EMULSION 20 G/100ML
16.7 EMULSION INTRAVENOUS
Qty: 20 | Refills: 0 | Status: DISCONTINUED | OUTPATIENT
Start: 2019-11-28 | End: 2019-11-28

## 2019-11-28 RX ORDER — I.V. FAT EMULSION 20 G/100ML
14.6 EMULSION INTRAVENOUS
Qty: 35 | Refills: 0 | Status: DISCONTINUED | OUTPATIENT
Start: 2019-11-28 | End: 2019-11-29

## 2019-11-28 RX ORDER — MORPHINE SULFATE 50 MG/1
1 CAPSULE, EXTENDED RELEASE ORAL EVERY 4 HOURS
Refills: 0 | Status: DISCONTINUED | OUTPATIENT
Start: 2019-11-28 | End: 2019-11-30

## 2019-11-28 RX ORDER — KETOROLAC TROMETHAMINE 30 MG/ML
15 SYRINGE (ML) INJECTION EVERY 6 HOURS
Refills: 0 | Status: DISCONTINUED | OUTPATIENT
Start: 2019-11-28 | End: 2019-11-29

## 2019-11-28 RX ORDER — MORPHINE SULFATE 50 MG/1
2 CAPSULE, EXTENDED RELEASE ORAL EVERY 4 HOURS
Refills: 0 | Status: DISCONTINUED | OUTPATIENT
Start: 2019-11-28 | End: 2019-11-30

## 2019-11-28 RX ADMIN — Medication 15 MILLIGRAM(S): at 11:56

## 2019-11-28 RX ADMIN — MORPHINE SULFATE 2 MILLIGRAM(S): 50 CAPSULE, EXTENDED RELEASE ORAL at 08:18

## 2019-11-28 RX ADMIN — Medication 15 MILLIGRAM(S): at 17:40

## 2019-11-28 RX ADMIN — I.V. FAT EMULSION 14.6 ML/HR: 20 EMULSION INTRAVENOUS at 17:55

## 2019-11-28 RX ADMIN — OXYCODONE HYDROCHLORIDE 5 MILLIGRAM(S): 5 TABLET ORAL at 04:00

## 2019-11-28 RX ADMIN — Medication 50 MICROGRAM(S): at 00:01

## 2019-11-28 RX ADMIN — Medication 50 MICROGRAM(S): at 23:09

## 2019-11-28 RX ADMIN — PANTOPRAZOLE SODIUM 40 MILLIGRAM(S): 20 TABLET, DELAYED RELEASE ORAL at 11:49

## 2019-11-28 RX ADMIN — Medication 650 MILLIGRAM(S): at 23:09

## 2019-11-28 RX ADMIN — Medication 5 MILLIGRAM(S): at 01:21

## 2019-11-28 RX ADMIN — Medication 5 MILLIGRAM(S): at 05:28

## 2019-11-28 RX ADMIN — Medication 1 EACH: at 17:55

## 2019-11-28 RX ADMIN — Medication 15 MILLIGRAM(S): at 17:07

## 2019-11-28 RX ADMIN — Medication 650 MILLIGRAM(S): at 12:50

## 2019-11-28 RX ADMIN — OXYCODONE HYDROCHLORIDE 5 MILLIGRAM(S): 5 TABLET ORAL at 00:00

## 2019-11-28 RX ADMIN — Medication 15 MILLIGRAM(S): at 23:45

## 2019-11-28 RX ADMIN — Medication 5 MILLIGRAM(S): at 11:51

## 2019-11-28 RX ADMIN — Medication 650 MILLIGRAM(S): at 23:40

## 2019-11-28 RX ADMIN — Medication 15 MILLIGRAM(S): at 23:09

## 2019-11-28 RX ADMIN — Medication 650 MILLIGRAM(S): at 11:46

## 2019-11-28 RX ADMIN — Medication 650 MILLIGRAM(S): at 17:07

## 2019-11-28 RX ADMIN — ENOXAPARIN SODIUM 40 MILLIGRAM(S): 100 INJECTION SUBCUTANEOUS at 11:46

## 2019-11-28 RX ADMIN — Medication 650 MILLIGRAM(S): at 17:40

## 2019-11-28 RX ADMIN — Medication 15 MILLIGRAM(S): at 12:20

## 2019-11-28 RX ADMIN — CHLORHEXIDINE GLUCONATE 1 APPLICATION(S): 213 SOLUTION TOPICAL at 11:35

## 2019-11-28 RX ADMIN — OXYCODONE HYDROCHLORIDE 5 MILLIGRAM(S): 5 TABLET ORAL at 00:30

## 2019-11-28 RX ADMIN — OXYCODONE HYDROCHLORIDE 5 MILLIGRAM(S): 5 TABLET ORAL at 03:30

## 2019-11-28 NOTE — PROGRESS NOTE ADULT - SUBJECTIVE AND OBJECTIVE BOX
[facility] NUTRITION SUPPORT--  Attending/ PA FOLLOW UP NOTE      24 hour events/subjective: Denies Palpitations, chest pain, shortness of breath. Denies nausea nor vomiting nor abdominal pain.        PAST HISTORY  --------------------------------------------------------------------------------  No significant changes to PMH, PSH, FHx, SHx, unless otherwise noted    ALLERGIES & MEDICATIONS  --------------------------------------------------------------------------------  Allergies    No Known Allergies    Intolerances      Standing Inpatient Medications  acetaminophen   Tablet .. 650 milliGRAM(s) Oral every 6 hours  chlorhexidine 2% Cloths 1 Application(s) Topical daily  enoxaparin Injectable 40 milliGRAM(s) SubCutaneous daily  fat emulsion (Fish Oil and Plant Based) 20% Infusion 8.3 mL/Hr IV Continuous <Continuous>  influenza   Vaccine 0.5 milliLiter(s) IntraMuscular once  ketorolac   Injectable 15 milliGRAM(s) IV Push every 6 hours  levothyroxine Injectable 50 MICROGram(s) IV Push at bedtime  metoprolol tartrate Injectable 5 milliGRAM(s) IV Push every 6 hours  pantoprazole  Injectable 40 milliGRAM(s) IV Push daily  Parenteral Nutrition - Adult 1 Each TPN Continuous <Continuous>    PRN Inpatient Medications  morphine  - Injectable 2 milliGRAM(s) IV Push every 4 hours PRN  ondansetron Injectable 4 milliGRAM(s) IV Push every 6 hours PRN      REVIEW OF SYSTEMS  --------------------------------------------------------------------------------  Gen: as per HPI  Skin: No rashes  Head/Eyes/Ears/Mouth: No headache;No sore throat  Respiratory: No dyspnea, cough,   CV: No chest pain, PND, orthopnea  GI: as per HPI  : No increased frequency, dysuria, hematuria, nocturia  MSK: No joint pain/swelling; no back pain; no edema  Neuro: No dizziness/lightheadedness, weakness, seizures, numbness, tingling  Psych: No significant nervousness, anxiety, stress, depression    All other systems were reviewed and are negative, except as noted.      LABS/STUDIES  --------------------------------------------------------------------------------              8.6    8.92  >-----------<  197      [11-28-19 @ 08:57]              27.1     138  |  101  |  19  ----------------------------<  117      [11-28-19 @ 05:49]  4.2   |  25  |  0.87        Ca     8.4     [11-28-19 @ 05:49]      iCa    1.15     [11-28 @ 05:49]      Mg     2.0     [11-28-19 @ 05:49]      Phos  3.0     [11-28-19 @ 05:49]    TPro  5.4  /  Alb  3.3  /  TBili  0.2  /  DBili  x   /  AST  16  /  ALT  21  /  AlkPhos  74  [11-28-19 @ 05:49]            Glucose, Serum: 117 mg/dL (11-28-19 @ 05:49)    PrealbuminPrealbumin, Serum: 22 mg/dL (11-26-19 @ 07:29)  Prealbumin, Serum: 21 mg/dL (11-22-19 @ 08:19)    Triglycerides      11-27-19 @ 07:01  -  11-28-19 @ 07:00  --------------------------------------------------------  IN: 2176 mL / OUT: 1725 mL / NET: 451 mL    11-28-19 @ 07:01  -  11-28-19 @ 09:20  --------------------------------------------------------  IN: 120 mL / OUT: 400 mL / NET: -280 mL        VITALS/PHYSICAL EXAM  --------------------------------------------------------------------------------  T(C): 37.3 (11-28-19 @ 08:31), Max: 37.3 (11-28-19 @ 08:31)  HR: 69 (11-28-19 @ 08:31) (2 - 72)  BP: 140/89 (11-28-19 @ 08:31) (121/67 - 165/80)  RR: 18 (11-28-19 @ 08:31) (18 - 18)  SpO2: 95% (11-28-19 @ 08:31) (95% - 97%)  Wt(kg): --    Gen:     NAD, A&Ox3. WN/WD/WG  	HEENT: NC/AT. PERRL,               Neck: trachea midline              Chest: non labored breathing  	Abd: softly distended, appropriately tender                  dressing c/d/i              MSK; FROMx4, no deformities              Vascular: Warm, no clubbing, cyanosis, nor edema                   LUE PICC dressing c/d/i  	Neuro: No focal deficits, sensation & strength grossly intact  	Psych: Normal affect and mood  	Skin: Warm, without rashes, good turgor  .  .  .  .  . [facility] NUTRITION SUPPORT--  Attending/ PA FOLLOW UP NOTE      24 hour events/subjective:  Pt on TPN tolerating, denies palpitations, chest pain, shortness of breath, denies nausea nor vomiting nor abdominal pain. Pt tolerating some clears, denies any BM nor flatus        PAST HISTORY  --------------------------------------------------------------------------------  No significant changes to PMH, PSH, FHx, SHx, unless otherwise noted    ALLERGIES & MEDICATIONS  --------------------------------------------------------------------------------  Allergies    No Known Allergies    Intolerances      Standing Inpatient Medications  acetaminophen   Tablet .. 650 milliGRAM(s) Oral every 6 hours  chlorhexidine 2% Cloths 1 Application(s) Topical daily  enoxaparin Injectable 40 milliGRAM(s) SubCutaneous daily  fat emulsion (Fish Oil and Plant Based) 20% Infusion 8.3 mL/Hr IV Continuous <Continuous>  influenza   Vaccine 0.5 milliLiter(s) IntraMuscular once  ketorolac   Injectable 15 milliGRAM(s) IV Push every 6 hours  levothyroxine Injectable 50 MICROGram(s) IV Push at bedtime  metoprolol tartrate Injectable 5 milliGRAM(s) IV Push every 6 hours  pantoprazole  Injectable 40 milliGRAM(s) IV Push daily  Parenteral Nutrition - Adult 1 Each TPN Continuous <Continuous>    PRN Inpatient Medications  morphine  - Injectable 2 milliGRAM(s) IV Push every 4 hours PRN  ondansetron Injectable 4 milliGRAM(s) IV Push every 6 hours PRN      REVIEW OF SYSTEMS  --------------------------------------------------------------------------------  Gen: as per HPI  Skin: No rashes  Head/Eyes/Ears/Mouth: No headache;No sore throat  Respiratory: No dyspnea, cough,   CV: No chest pain, PND, orthopnea  GI: as per HPI  : No increased frequency, dysuria, hematuria, nocturia  MSK: No joint pain/swelling; no back pain; no edema  Neuro: No dizziness/lightheadedness, weakness, seizures, numbness, tingling  Psych: No significant nervousness, anxiety, stress, depression    All other systems were reviewed and are negative, except as noted.      LABS/STUDIES  --------------------------------------------------------------------------------              8.6    8.92  >-----------<  197      [11-28-19 @ 08:57]              27.1     138  |  101  |  19  ----------------------------<  117      [11-28-19 @ 05:49]  4.2   |  25  |  0.87        Ca     8.4     [11-28-19 @ 05:49]      iCa    1.15     [11-28 @ 05:49]      Mg     2.0     [11-28-19 @ 05:49]      Phos  3.0     [11-28-19 @ 05:49]    TPro  5.4  /  Alb  3.3  /  TBili  0.2  /  DBili  x   /  AST  16  /  ALT  21  /  AlkPhos  74  [11-28-19 @ 05:49]            Glucose, Serum: 117 mg/dL (11-28-19 @ 05:49)    PrealbuminPrealbumin, Serum: 22 mg/dL (11-26-19 @ 07:29)  Prealbumin, Serum: 21 mg/dL (11-22-19 @ 08:19)    Triglycerides      11-27-19 @ 07:01  -  11-28-19 @ 07:00  --------------------------------------------------------  IN: 2176 mL / OUT: 1725 mL / NET: 451 mL    11-28-19 @ 07:01  -  11-28-19 @ 09:20  --------------------------------------------------------  IN: 120 mL / OUT: 400 mL / NET: -280 mL        VITALS/PHYSICAL EXAM  --------------------------------------------------------------------------------  T(C): 37.3 (11-28-19 @ 08:31), Max: 37.3 (11-28-19 @ 08:31)  HR: 69 (11-28-19 @ 08:31) (2 - 72)  BP: 140/89 (11-28-19 @ 08:31) (121/67 - 165/80)  RR: 18 (11-28-19 @ 08:31) (18 - 18)  SpO2: 95% (11-28-19 @ 08:31) (95% - 97%)  Wt(kg): --    Gen:     NAD, A&Ox3. WN/WD/WG  	HEENT: NC/AT. PERRL,               Neck: trachea midline              Chest: non labored breathing  	Abd: softly distended, appropriately tender                  dressing c/d/i              MSK; FROMx4, no deformities              Vascular: Warm, no clubbing, cyanosis, nor edema                   LUE PICC dressing c/d/i  	Neuro: No focal deficits, sensation & strength grossly intact  	Psych: Normal affect and mood  	Skin: Warm, without rashes, good turgor  .  .  .  .  .

## 2019-11-28 NOTE — PROGRESS NOTE ADULT - ASSESSMENT
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    Pt remains NPO w/ NGT;  TPN consulted for Pt w/ Protein-Calorie Malnutrition    TPN tolerated at GOAL AA & Dextrose & SMOF 20g   TPN recommendations as appropriate:   105 grams amino acids (700ml 15% a.a.)  280 grams dextrose (400ml 70% dex)  35 grams SMOFlipid (1751ml 20%IVFE @ 14.6ml/hr x 12 hours)    Total Calories: 1722 vicky/day (27cal/Kg per dosing wt 64.9Kg)  Total Protein: 105 Gm/day (1.6Gm/Kg per dosing wt 64.9Kg)  Micronutrients: 10ml MVI, 3ml MTE-5    Non-Protein Calories: 1302 vicky/day (20cal/Kg)  Dextrose Infusion Rate: 3 mg/Kg/min  Lipid Infusion Rate: 0.54 Gm/Kg/day; 0.04 Gm/Kg/hr      1. Dedicated Central line for TPN maintenence as per protocol  2. Strict Intake and Output.  3. Weights three times a week  4. Monitor BMP, Mg, Ionized Ca, Phosphorus daily  5. Check Triglycerides daily for first 3 days of TPN, then monthly   6. Pre-albumin weekly.  7. Fingersticks to monitor glucose every 6 hours until stable then may be decreased to twice a day,           coverage with ISS - to be ordered by primary team           Elevated serum glucose- HgA1c w/ am labs noted continue to monitor  8. Volume depletion - improving -Initiate TPN w/           NaCl 40mEq, NaAce 60mEq, NaPhos 30mMol, KCl 80mEq           HyperCa resolved w/ hydration - 10mEq Ca and 12mEqMg  9. Hem/ Onc appreciated  10. 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  TPN recommendations as appropriate:   105 grams amino acids (700ml 15% a.a.)  280 grams dextrose (400ml 70% dex)  35 grams SMOFlipid (1751ml 20%IVFE @ 14.6ml/hr x 12 hours)    Total Calories: 1722 vicky/day (27cal/Kg per dosing wt 64.9Kg)  Total Protein: 105 Gm/day (1.6Gm/Kg per dosing wt 64.9Kg)  Micronutrients: 10ml MVI, 3ml MTE-5    Non-Protein Calories: 1302 vicky/day (20cal/Kg)  Dextrose Infusion Rate: 3 mg/Kg/min  Lipid Infusion Rate: 0.54 Gm/Kg/day; 0.04 Gm/Kg/hr      1. Dedicated Central line for TPN maintenance as per protocol  2. Strict Intake and Output.  3. Weights three times a week  4. Monitor BMP, Mg, Ionized Ca, Phosphorus daily  5. Check Triglycerides daily for first 3 days of TPN, then monthly   6. Pre-albumin weekly.  7. Fingersticks to monitor glucose every 6 hours until stable then may be decreased to twice a day,           coverage with ISS - to be ordered by primary team           Elevated serum glucose- HgA1c w/ am labs noted continue to monitor  8. Volume depletion - improving -Initiate TPN w/           NaCl 40mEq, NaAce 60mEq, NaPhos 30mMol, KCl 80mEq           HyperCa resolved w/ hydration - 10mEq Ca and 12mEqMg  9. Hem/ Onc appreciated  10. Hypertriglyceremia- stable with giving only 20g Lipid in TPN  Continue as per Surgery, will follow with you, D/w primary team      TPN team, pager [phone]  D/w Corey Montes De Oca

## 2019-11-28 NOTE — PROGRESS NOTE ADULT - ASSESSMENT
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 He is now s/p SBR and appendectomy 11/26, recovering appropriately on the floor.    Plan:  - Clear liquid diet   - Monitor GI fxn potential diet advancement w passing flatus  - IV tylenol/Toradol, morphine for breakthrough pain  - Encourage OOB and early ambulation  - Continue home medications, restart ASA POD 7  - F/u hematology, no chemotherapy while admitted  - Lovenox for dvt ppx    Red Team Surgery  [id number]

## 2019-11-28 NOTE — PROGRESS NOTE ADULT - SUBJECTIVE AND OBJECTIVE BOX
POD2 s/p SBR with appendectomy    No acute events overnight    SUBJECTIVE:  Reports pain  Voiding  Is not yet passing gas     OBJECTIVE:  Vital Signs Last 24 Hrs  T(C): 37.3 (28 Nov 2019 08:31), Max: 37.3 (28 Nov 2019 08:31)  T(F): 99.1 (28 Nov 2019 08:31), Max: 99.1 (28 Nov 2019 08:31)  HR: 69 (28 Nov 2019 08:31) (2 - 72)  BP: 140/89 (28 Nov 2019 08:31) (121/67 - 165/80)  BP(mean): --  RR: 18 (28 Nov 2019 08:31) (18 - 18)  SpO2: 95% (28 Nov 2019 08:31) (95% - 97%)    Physical Examination:  GEN: NAD, resting quietly  NEURO: AAOx3, CN II-XII grossly intact, no focal deficits  PULM: symmetric chest rise bilaterally, no increased WOB  ABD: soft, nontender, mildly distended, midline incision CDI, dressing plain packing in between staples replaced.      LABS:                        8.6    8.92  )-----------( 197      ( 28 Nov 2019 08:57 )             27.1     11-28    138  |  101  |  19  ----------------------------<  117<H>  4.2   |  25  |  0.87    Ca    8.4      28 Nov 2019 05:49  Phos  3.0     11-28  Mg     2.0     11-28    TPro  5.4<L>  /  Alb  3.3  /  TBili  0.2  /  DBili  x   /  AST  16  /  ALT  21  /  AlkPhos  74  11-28

## 2019-11-29 ENCOUNTER — TRANSCRIPTION ENCOUNTER (OUTPATIENT)
Age: 69
End: 2019-11-29

## 2019-11-29 LAB
ALBUMIN SERPL ELPH-MCNC: 2.6 G/DL — LOW (ref 3.3–5)
ALP SERPL-CCNC: 63 U/L — SIGNIFICANT CHANGE UP (ref 40–120)
ALT FLD-CCNC: <5 U/L — LOW (ref 10–45)
ANION GAP SERPL CALC-SCNC: 11 MMOL/L — SIGNIFICANT CHANGE UP (ref 5–17)
ANION GAP SERPL CALC-SCNC: 12 MMOL/L — SIGNIFICANT CHANGE UP (ref 5–17)
ANISOCYTOSIS BLD QL: SIGNIFICANT CHANGE UP
AST SERPL-CCNC: <5 U/L — LOW (ref 10–40)
BASOPHILS # BLD AUTO: 0.04 K/UL — SIGNIFICANT CHANGE UP (ref 0–0.2)
BASOPHILS NFR BLD AUTO: 0.7 % — SIGNIFICANT CHANGE UP (ref 0–2)
BILIRUB SERPL-MCNC: 0.1 MG/DL — LOW (ref 0.2–1.2)
BUN SERPL-MCNC: 16 MG/DL — SIGNIFICANT CHANGE UP (ref 7–23)
BUN SERPL-MCNC: 18 MG/DL — SIGNIFICANT CHANGE UP (ref 7–23)
CA-I BLD-SCNC: 1.24 MMOL/L — SIGNIFICANT CHANGE UP (ref 1.12–1.3)
CALCIUM SERPL-MCNC: 8.8 MG/DL — SIGNIFICANT CHANGE UP (ref 8.4–10.5)
CALCIUM SERPL-MCNC: 8.9 MG/DL — SIGNIFICANT CHANGE UP (ref 8.4–10.5)
CHLORIDE SERPL-SCNC: 101 MMOL/L — SIGNIFICANT CHANGE UP (ref 96–108)
CHLORIDE SERPL-SCNC: 94 MMOL/L — LOW (ref 96–108)
CO2 SERPL-SCNC: 23 MMOL/L — SIGNIFICANT CHANGE UP (ref 22–31)
CO2 SERPL-SCNC: 26 MMOL/L — SIGNIFICANT CHANGE UP (ref 22–31)
CREAT SERPL-MCNC: 0.75 MG/DL — SIGNIFICANT CHANGE UP (ref 0.5–1.3)
CREAT SERPL-MCNC: 0.81 MG/DL — SIGNIFICANT CHANGE UP (ref 0.5–1.3)
ELLIPTOCYTES BLD QL SMEAR: SLIGHT — SIGNIFICANT CHANGE UP
EOSINOPHIL # BLD AUTO: 0.26 K/UL — SIGNIFICANT CHANGE UP (ref 0–0.5)
EOSINOPHIL NFR BLD AUTO: 4.4 % — SIGNIFICANT CHANGE UP (ref 0–6)
GIANT PLATELETS BLD QL SMEAR: PRESENT — SIGNIFICANT CHANGE UP
GLUCOSE SERPL-MCNC: 100 MG/DL — HIGH (ref 70–99)
GLUCOSE SERPL-MCNC: 978 MG/DL — CRITICAL HIGH (ref 70–99)
HCT VFR BLD CALC: 26.6 % — LOW (ref 39–50)
HGB BLD-MCNC: 8.2 G/DL — LOW (ref 13–17)
HYPOCHROMIA BLD QL: SLIGHT — SIGNIFICANT CHANGE UP
LYMPHOCYTES # BLD AUTO: 0.26 K/UL — LOW (ref 1–3.3)
LYMPHOCYTES # BLD AUTO: 4.4 % — LOW (ref 13–44)
MAGNESIUM SERPL-MCNC: 2.5 MG/DL — SIGNIFICANT CHANGE UP (ref 1.6–2.6)
MANUAL SMEAR VERIFICATION: SIGNIFICANT CHANGE UP
MCHC RBC-ENTMCNC: 27.4 PG — SIGNIFICANT CHANGE UP (ref 27–34)
MCHC RBC-ENTMCNC: 30.8 GM/DL — LOW (ref 32–36)
MCV RBC AUTO: 89 FL — SIGNIFICANT CHANGE UP (ref 80–100)
MONOCYTES # BLD AUTO: 0.26 K/UL — SIGNIFICANT CHANGE UP (ref 0–0.9)
MONOCYTES NFR BLD AUTO: 4.3 % — SIGNIFICANT CHANGE UP (ref 2–14)
NEUTROPHILS # BLD AUTO: 5.16 K/UL — SIGNIFICANT CHANGE UP (ref 1.8–7.4)
NEUTROPHILS NFR BLD AUTO: 86.2 % — HIGH (ref 43–77)
PHOSPHATE SERPL-MCNC: 3.5 MG/DL — SIGNIFICANT CHANGE UP (ref 2.5–4.5)
PHOSPHATE SERPL-MCNC: 7.2 MG/DL — HIGH (ref 2.5–4.5)
PLAT MORPH BLD: NORMAL — SIGNIFICANT CHANGE UP
PLATELET # BLD AUTO: 176 K/UL — SIGNIFICANT CHANGE UP (ref 150–400)
POLYCHROMASIA BLD QL SMEAR: SLIGHT — SIGNIFICANT CHANGE UP
POTASSIUM SERPL-MCNC: 4.3 MMOL/L — SIGNIFICANT CHANGE UP (ref 3.5–5.3)
POTASSIUM SERPL-MCNC: 7.2 MMOL/L — CRITICAL HIGH (ref 3.5–5.3)
POTASSIUM SERPL-SCNC: 4.3 MMOL/L — SIGNIFICANT CHANGE UP (ref 3.5–5.3)
POTASSIUM SERPL-SCNC: 7.2 MMOL/L — CRITICAL HIGH (ref 3.5–5.3)
PROT SERPL-MCNC: 5.1 G/DL — LOW (ref 6–8.3)
RBC # BLD: 2.99 M/UL — LOW (ref 4.2–5.8)
RBC # FLD: 24.9 % — HIGH (ref 10.3–14.5)
RBC BLD AUTO: ABNORMAL
SODIUM SERPL-SCNC: 129 MMOL/L — LOW (ref 135–145)
SODIUM SERPL-SCNC: 138 MMOL/L — SIGNIFICANT CHANGE UP (ref 135–145)
TRIGL SERPL-MCNC: 210 MG/DL — HIGH (ref 10–149)
TRIGL SERPL-MCNC: 975 MG/DL — HIGH (ref 10–149)
WBC # BLD: 5.99 K/UL — SIGNIFICANT CHANGE UP (ref 3.8–10.5)
WBC # FLD AUTO: 5.99 K/UL — SIGNIFICANT CHANGE UP (ref 3.8–10.5)

## 2019-11-29 PROCEDURE — 99232 SBSQ HOSP IP/OBS MODERATE 35: CPT

## 2019-11-29 RX ORDER — ELECTROLYTE SOLUTION,INJ
1 VIAL (ML) INTRAVENOUS
Refills: 0 | Status: DISCONTINUED | OUTPATIENT
Start: 2019-11-29 | End: 2019-11-29

## 2019-11-29 RX ORDER — KETOROLAC TROMETHAMINE 30 MG/ML
15 SYRINGE (ML) INJECTION EVERY 6 HOURS
Refills: 0 | Status: DISCONTINUED | OUTPATIENT
Start: 2019-11-29 | End: 2019-11-30

## 2019-11-29 RX ORDER — I.V. FAT EMULSION 20 G/100ML
8.3 EMULSION INTRAVENOUS
Qty: 20 | Refills: 0 | Status: DISCONTINUED | OUTPATIENT
Start: 2019-11-29 | End: 2019-11-30

## 2019-11-29 RX ADMIN — Medication 650 MILLIGRAM(S): at 12:02

## 2019-11-29 RX ADMIN — Medication 5 MILLIGRAM(S): at 12:03

## 2019-11-29 RX ADMIN — ENOXAPARIN SODIUM 40 MILLIGRAM(S): 100 INJECTION SUBCUTANEOUS at 12:02

## 2019-11-29 RX ADMIN — Medication 1 EACH: at 18:03

## 2019-11-29 RX ADMIN — Medication 5 MILLIGRAM(S): at 01:59

## 2019-11-29 RX ADMIN — MORPHINE SULFATE 1 MILLIGRAM(S): 50 CAPSULE, EXTENDED RELEASE ORAL at 04:30

## 2019-11-29 RX ADMIN — Medication 15 MILLIGRAM(S): at 18:25

## 2019-11-29 RX ADMIN — I.V. FAT EMULSION 8.3 ML/HR: 20 EMULSION INTRAVENOUS at 18:03

## 2019-11-29 RX ADMIN — Medication 15 MILLIGRAM(S): at 17:55

## 2019-11-29 RX ADMIN — CHLORHEXIDINE GLUCONATE 1 APPLICATION(S): 213 SOLUTION TOPICAL at 12:02

## 2019-11-29 RX ADMIN — MORPHINE SULFATE 1 MILLIGRAM(S): 50 CAPSULE, EXTENDED RELEASE ORAL at 03:53

## 2019-11-29 RX ADMIN — Medication 650 MILLIGRAM(S): at 19:44

## 2019-11-29 RX ADMIN — Medication 50 MICROGRAM(S): at 22:33

## 2019-11-29 RX ADMIN — Medication 15 MILLIGRAM(S): at 08:40

## 2019-11-29 RX ADMIN — Medication 650 MILLIGRAM(S): at 12:32

## 2019-11-29 RX ADMIN — Medication 15 MILLIGRAM(S): at 09:10

## 2019-11-29 RX ADMIN — PANTOPRAZOLE SODIUM 40 MILLIGRAM(S): 20 TABLET, DELAYED RELEASE ORAL at 12:04

## 2019-11-29 RX ADMIN — Medication 650 MILLIGRAM(S): at 19:40

## 2019-11-29 RX ADMIN — Medication 5 MILLIGRAM(S): at 18:00

## 2019-11-29 RX ADMIN — Medication 5 MILLIGRAM(S): at 05:57

## 2019-11-29 NOTE — DISCHARGE NOTE PROVIDER - NSDCCPTREATMENT_GEN_ALL_CORE_FT
PRINCIPAL PROCEDURE  Procedure: Small bowel resection with anastomosis  Findings and Treatment:       SECONDARY PROCEDURE  Procedure: Open appendectomy  Findings and Treatment:     Procedure: Exploratory laparotomy  Findings and Treatment:

## 2019-11-29 NOTE — DISCHARGE NOTE PROVIDER - PROVIDER TOKENS
PROVIDER:[TOKEN:[[id number]],FOLLOWUP:[2 weeks]] PROVIDER:[TOKEN:[[id number]],FOLLOWUP:[1 week]]

## 2019-11-29 NOTE — PROGRESS NOTE ADULT - ATTENDING COMMENTS
+ small amount of flatus last night, ivan clears, not hungry, no n/v    aaox3  abd soft, tender at incision, incision c/d/i    s/p small bowel resection  advance diet for dinner  OOB
Pt seen with wife at bedside. NGT removed and awaiting surgery next Tuesday. Will continue to be off chemotherapy until recovered from surgery.
Agree with above.   Pt to continue recovering from surgery. Will follow up on path.   Pt to f/u with Dr. Kingston after hospital d/c.

## 2019-11-29 NOTE — PROGRESS NOTE ADULT - ASSESSMENT
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 He is now s/p SBR and appendectomy 11/26, recovering appropriately on the floor.    Plan:  - c/w Clears  - Monitor GI fxn potential diet advancement w passing flatus  - IV tylenol/Toradol, morphine for breakthrough pain  - Encourage OOB and early ambulation  - Continue home medications, restart ASA POD 7  - F/u hematology, no chemotherapy while admitted  - Lovenox for dvt ppx    Red Team Surgery  [id number]

## 2019-11-29 NOTE — DISCHARGE NOTE PROVIDER - NSDCMRMEDTOKEN_GEN_ALL_CORE_FT
allopurinol 100 mg oral tablet: 1 tab(s) orally once a day  Aspir 81 oral delayed release tablet: 1 tab(s) orally once a day  atorvastatin 40 mg oral tablet: 1 tab(s) orally once a day  Cozaar 100 mg oral tablet: 1 tab(s) orally once a day  Levothroid 100 mcg (0.1 mg) oral tablet: 1 tab(s) orally once a day  metoprolol tartrate 50 mg oral tablet: 1 tab(s) orally once a day  omeprazole 40 mg oral delayed release capsule: 1 cap(s) orally once a day  Reglan 5 mg oral tablet: 1 tab(s) orally 4 times a day (before meals and at bedtime), As Needed Strong peripheral pulses allopurinol 100 mg oral tablet: 1 tab(s) orally once a day  Aspir 81 oral delayed release tablet: 1 tab(s) orally once a day  Please resume on December 3rd  atorvastatin 40 mg oral tablet: 1 tab(s) orally once a day  Cozaar 100 mg oral tablet: 1 tab(s) orally once a day  Levothroid 100 mcg (0.1 mg) oral tablet: 1 tab(s) orally once a day  metoprolol tartrate 50 mg oral tablet: 1 tab(s) orally once a day  omeprazole 40 mg oral delayed release capsule: 1 cap(s) orally once a day  Reglan 5 mg oral tablet: 1 tab(s) orally 4 times a day (before meals and at bedtime), As Needed allopurinol 100 mg oral tablet: 1 tab(s) orally once a day  Aspir 81 oral delayed release tablet: 1 tab(s) orally once a day  Please resume on December 3rd  atorvastatin 40 mg oral tablet: 1 tab(s) orally once a day  Cozaar 100 mg oral tablet: 1 tab(s) orally once a day  Levothroid 100 mcg (0.1 mg) oral tablet: 1 tab(s) orally once a day  metoprolol tartrate 50 mg oral tablet: 1 tab(s) orally once a day  omeprazole 40 mg oral delayed release capsule: 1 cap(s) orally once a day  oxyCODONE 5 mg oral tablet: 1 tab(s) orally every 6 hours MDD:As needed for pain Max daily dose 4 tabs  Reglan 5 mg oral tablet: 1 tab(s) orally 4 times a day (before meals and at bedtime), As Needed

## 2019-11-29 NOTE — DISCHARGE NOTE PROVIDER - CARE PROVIDER_API CALL
Everette Cunha)  ColonRectal Surgery; Surgery  [address], [address]  [place] [address]  Phone: [phone]  Fax: [phone]  Follow Up Time: 2 weeks Everette Cunha)  ColonRectal Surgery; Surgery  [address], [address]  [place] [address]  Phone: [phone]  Fax: [phone]  Follow Up Time: 1 week

## 2019-11-29 NOTE — PROGRESS NOTE ADULT - SUBJECTIVE AND OBJECTIVE BOX
[facility] NUTRITION SUPPORT / TPN -- FOLLOW UP NOTE  --------------------------------------------------------------------------------    24 hour events/subjective:  s/p Ex Lap, PRINCE, SBR, and Appy- pain managed   NO N/V/D  tolerating CLD &  TPN- no complaints  No gas no BM, but feels gassy  ambulating without difficulty  No cp/palp/sob/dyspnea  no f/c/s    Diet:  Clears    Appetite: [ x ]Poor [  ]Adequate [  ]Good  Caloric intake:  [   x]  Adequate   [   ] Inadequate    ROS: General/ GI see HPI  all other systems negative    ALLERGIES & MEDICATIONS  --------------------------------------------------------------------------------  No Known Allergies      STANDING INPATIENT MEDICATIONS    acetaminophen   Tablet .. 650 milliGRAM(s) Oral every 6 hours  chlorhexidine 2% Cloths 1 Application(s) Topical daily  enoxaparin Injectable 40 milliGRAM(s) SubCutaneous daily  fat emulsion (Fish Oil and Plant Based) 20% Infusion 8.3 mL/Hr IV Continuous <Continuous>  influenza   Vaccine 0.5 milliLiter(s) IntraMuscular once  levothyroxine Injectable 50 MICROGram(s) IV Push at bedtime  metoprolol tartrate Injectable 5 milliGRAM(s) IV Push every 6 hours  pantoprazole  Injectable 40 milliGRAM(s) IV Push daily  Parenteral Nutrition - Adult 1 Each TPN Continuous <Continuous>  Parenteral Nutrition - Adult 1 Each TPN Continuous <Continuous>      PRN INPATIENT MEDICATION  morphine  - Injectable 2 milliGRAM(s) IV Push every 4 hours PRN  morphine  - Injectable 1 milliGRAM(s) IV Push every 4 hours PRN  ondansetron Injectable 4 milliGRAM(s) IV Push every 6 hours PRN        VITALS/PHYSICAL EXAM  --------------------------------------------------------------------------------  T(C): 36.7 (11-29-19 @ 14:38), Max: 36.8 (11-29-19 @ 09:38)  HR: 65 (11-29-19 @ 14:38) (57 - 70)  BP: 131/78 (11-29-19 @ 14:38) (131/78 - 153/83)  RR: 18 (11-29-19 @ 14:38) (18 - 18)  SpO2: 97% (11-29-19 @ 14:38) (95% - 97%)  Wt(kg): --        11-28-19 @ 07:01  -  11-29-19 @ 07:00  --------------------------------------------------------  IN: 2918 mL / OUT: 1975 mL / NET: 943 mL    11-29-19 @ 07:01  -  11-29-19 @ 15:30  --------------------------------------------------------  IN: 160 mL / OUT: 800 mL / NET: -640 mL    PHYSICAL EXAM  --------------------------------------------------------------------------------  	Gen: NAD, A&Ox3. WN/WD/WG  	HEENT: NC/AT. PERRL, supple neck, clear oropharynx, mucosa moist  	GI: (+) BS, softly distended, appropriately tender                  dressing c/d/i              MSK; FROMx4, no deformities              Vascular: Warm, no clubbing, cyanosis, nor edema                   LUE PICC dressing c/d/i  	Neuro: No focal deficits, sensation & strength grossly intact  	Psych: Normal affect and mood  	Skin: Warm, without rashes, good turgor        LABS/ CULTURES/ RADIOLOGY:              8.2    5.99  >-----------<  176      [11-29-19 @ 08:33]              26.6     138  |  101  |  18  ----------------------------<  100      [11-29-19 @ 08:47]  4.3   |  26  |  0.75        Ca     8.8     [11-29-19 @ 08:47]      iCa    1.24     [11-29 @ 07:05]      Mg     2.5     [11-29-19 @ 07:08]      Phos  3.5     [11-29-19 @ 08:47]    TPro  5.1  /  Alb  2.6  /  TBili  0.1  /  DBili  x   /  AST  <5  /  ALT  <5  /  AlkPhos  63  [11-29-19 @ 07:08]    Prealbumin, Serum: 22 mg/dL (11-26-19 @ 07:29)  Prealbumin, Serum: 21 mg/dL (11-22-19 @ 08:19)      Triglycerides, Serum: 210 mg/dL (11.29.19 @ 08:47)  Triglycerides, Serum: 205 mg/dL (11.28.19 @ 05:49)  Triglycerides, Serum: 193 mg/dL (11.27.19 @ 07:15)

## 2019-11-29 NOTE — PROGRESS NOTE ADULT - ASSESSMENT
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    Pt remains NPO w/ NGT;  TPN consulted for Pt w/ Protein-Calorie Malnutrition    TPN tolerated at GOAL AA & Dextrose & SMOF 20g   TPN recommendations as appropriate:   105 grams amino acids (700ml 15% a.a.)  280 grams dextrose (400ml 70% dex)  35 grams SMOFlipid (1751ml 20%IVFE @ 14.6ml/hr x 12 hours)    Total Calories: 1722 vicky/day (27cal/Kg per dosing wt 64.9Kg)  Total Protein: 105 Gm/day (1.6Gm/Kg per dosing wt 64.9Kg)  Micronutrients: 10ml MVI, 3ml MTE-5    Non-Protein Calories: 1302 vicky/day (20cal/Kg)  Dextrose Infusion Rate: 3 mg/Kg/min  Lipid Infusion Rate: 0.54 Gm/Kg/day; 0.04 Gm/Kg/hr      1. Dedicated Central line for TPN maintanence as per protocol  2. Strict Intake and Output.  3. Weights three times a week  4. Monitor BMP, Mg, Ionized Ca, Phosphorus daily  5. Check Triglycerides daily for first 3 days of TPN, then monthly   6. Pre-albumin weekly.  7. Fingersticks to monitor glucose every 6 hours until stable then may be decreased to twice a day,           coverage with ISS - to be ordered by primary team           Elevated serum glucose- HgA1c w/ am labs noted continue to monitor  8. Volume depletion - improving -Initiate TPN w/           NaCl 40mEq, NaAce 60mEq, NaPhos 30mMol, KCl 80mEq           HyperCa resolved w/ hydration - 10mEq Ca and 12mEqMg  9. Hem/ Onc appreciated  10. Hypertriglyceremia- stable with giving only 20g Lipid in TPN- keep at 20g  Continue as per Surgery, will follow with you, D/w primary team    Kendy Duque PA-C  TPN team, pager [phone]  D/w Corey Montes De Oca

## 2019-11-29 NOTE — PROGRESS NOTE ADULT - SUBJECTIVE AND OBJECTIVE BOX
RED SURGERY DAILY PROGRESS NOTE:       SUBJECTIVE/ROS: Patient seen and examined at bedside.  No acute overnight events.  Patient states he is tolerating clears but passing minimal gas.  He has been oob/ambulating and voiding appropriately.         MEDICATIONS  (STANDING):  acetaminophen   Tablet .. 650 milliGRAM(s) Oral every 6 hours  chlorhexidine 2% Cloths 1 Application(s) Topical daily  enoxaparin Injectable 40 milliGRAM(s) SubCutaneous daily  fat emulsion (Fish Oil and Plant Based) 20% Infusion 14.6 mL/Hr (14.6 mL/Hr) IV Continuous <Continuous>  influenza   Vaccine 0.5 milliLiter(s) IntraMuscular once  levothyroxine Injectable 50 MICROGram(s) IV Push at bedtime  metoprolol tartrate Injectable 5 milliGRAM(s) IV Push every 6 hours  pantoprazole  Injectable 40 milliGRAM(s) IV Push daily  Parenteral Nutrition - Adult 1 Each (83 mL/Hr) TPN Continuous <Continuous>    MEDICATIONS  (PRN):  morphine  - Injectable 2 milliGRAM(s) IV Push every 4 hours PRN Severe Pain (7 - 10)  morphine  - Injectable 1 milliGRAM(s) IV Push every 4 hours PRN breakthru pain  ondansetron Injectable 4 milliGRAM(s) IV Push every 6 hours PRN Nausea      OBJECTIVE:    Vital Signs Last 24 Hrs  T(C): 36.4 (29 Nov 2019 05:23), Max: 37.3 (28 Nov 2019 08:31)  T(F): 97.5 (29 Nov 2019 05:23), Max: 99.1 (28 Nov 2019 08:31)  HR: 66 (29 Nov 2019 05:23) (57 - 69)  BP: 150/78 (29 Nov 2019 05:23) (125/65 - 153/83)  BP(mean): --  RR: 18 (29 Nov 2019 05:23) (18 - 18)  SpO2: 96% (29 Nov 2019 05:23) (95% - 100%)        I&O's Detail    28 Nov 2019 07:01  -  29 Nov 2019 07:00  --------------------------------------------------------  IN:    fat emulsion (Fish Oil and Plant Based) 20% Infusion: 146 mL    Oral Fluid: 780 mL    TPN (Total Parenteral Nutrition): 1992 mL  Total IN: 2918 mL    OUT:    Voided: 1975 mL  Total OUT: 1975 mL    Total NET: 943 mL          Daily     Daily     LABS:                        8.6    8.92  )-----------( 197      ( 28 Nov 2019 08:57 )             27.1     11-28    138  |  101  |  19  ----------------------------<  117<H>  4.2   |  25  |  0.87    Ca    8.4      28 Nov 2019 05:49  Phos  7.2     11-29  Mg     2.5     11-29    TPro  5.4<L>  /  Alb  3.3  /  TBili  0.2  /  DBili  x   /  AST  16  /  ALT  21  /  AlkPhos  74  11-28                  PHYSICAL EXAM:  GEN: NAD, resting quietly  NEURO: AAOx3, CN II-XII grossly intact, no focal deficits  PULM: symmetric chest rise bilaterally, no increased WOB  ABD: soft, nontender, mildly distended, midline incision CDI, dressing plain packing in between staples replaced.

## 2019-11-29 NOTE — DISCHARGE NOTE PROVIDER - NSDCACTIVITY_GEN_ALL_CORE
Walking - Indoors allowed/Stairs allowed/No heavy lifting/straining/Walking - Outdoors allowed/Showering allowed

## 2019-11-29 NOTE — DISCHARGE NOTE PROVIDER - NSDCFUSCHEDAPPT_GEN_ALL_CORE_FT
ANNA BATES ; 12/12/2019 ; [facility]  ANNA BATES ; 01/02/2020 ; [facility]  ANNA BATES ; 01/09/2020 ; [facility]  ANNA BATES ; 01/23/2020 ; [facility]  ANNA BATES ; 01/30/2020 ; [facility] ANNA BATES ; 12/12/2019 ; [facility]  ANNA BATES ; 01/02/2020 ; [facility]  ANNA BATES ; 01/09/2020 ; [facility]  ANNA BATES ; 01/23/2020 ; [facility]  ANNA BATES ; 01/30/2020 ; [facility] ANNA BATES ; 12/12/2019 ; [facility]  ANNA BATES ; 01/02/2020 ; [facility]  ANNA BATES ; 01/09/2020 ; [facility]  ANNA BATES ; 01/23/2020 ; [facility]  ANNA BATES ; 01/30/2020 ; [facility] ANNA BATES ; 12/12/2019 ; [facility]  ANNA BATES ; 01/02/2020 ; [facility]  ANNA BATES ; 01/09/2020 ; [facility]  ANNA BATES ; 01/23/2020 ; [facility]  ANNA BATES ; 01/30/2020 ; [facility] ANNA BATES ; 12/12/2019 ; [facility]  ANNA BATES ; 01/02/2020 ; [facility]  ANNA BATES ; 01/09/2020 ; [facility]  ANNA BATES ; 01/23/2020 ; [facility]  ANNA BATES ; 01/30/2020 ; [facility]

## 2019-11-29 NOTE — DISCHARGE NOTE PROVIDER - HOSPITAL COURSE
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 Pt was in the ER 1 week ago for a partial SBO secondary to a known jejunal mass. While in the ER he passed flatus and tolerated PO so he was discharged. He reports tolerating a diet without any issues for the past 5 days. He's coming back in today with 3 episodes of NBNB emesis overnight. He also reports LLQ 9/10 stabbing, intermittent abdominal pain that started last night. Last episode of flatus and BM were yesterday afternoon (no BRBPR or melena). No fever, chills, lightheadedness, dizziness, chest pain, SOB.     Last colonoscopy was in August 2019 for anemia. Colonoscopy was wnl, but patient had abdominal pain afterwards and CT scan showed the jejunal mass. Biopsied in September 2019 and shown to be B cell lymphoma.         CT abdomen: IMPRESSION: Small bowel obstruction with transition point at the level of the jejunal mass as seen on prior CT with interval increase in dilation of the involved bowel loops.         The patient was admitted to the Trauma surgery service, made NPO/IVF, an NG tube was placed. Heme/onc was consulted and recommended hold 3rd cycle of chemo (was due 11/21) for now in preparation for OR.  Medicine was consulted for OR optimization.        TPN consulted for Pt w/ Protein-Calorie Malnutrition and patient started on TPN.          11/26- Patient underwent Ex-lap, PRINCE, SBR, and appendectomy. The patient tolerated the procedure well without complications, was extubated, and transferred to the PACU in stable condition. In the PACU, the patients' pain was controlled, vitals stable, tolerating PO, and voiding appropriately.  The patient was transferred to the surgical floor in stable condition.         The patient was managed post operatively for pain control as well as return of GI function.  Once patient regained GI function his diet was advanced which he tolerated and TPN weaned.        On day of discharge, the patient was tolerating diet, ambulating well and pain controlled. The patient will be discharged home with outpatient follow up with Dr. Cunha as well as his oncologist.

## 2019-11-29 NOTE — DISCHARGE NOTE PROVIDER - NSDCCPCAREPLAN_GEN_ALL_CORE_FT
PRINCIPAL DISCHARGE DIAGNOSIS  Diagnosis: Small bowel obstruction  Assessment and Plan of Treatment: PRINCIPAL DISCHARGE DIAGNOSIS  Diagnosis: Small bowel obstruction  Assessment and Plan of Treatment: WOUND CARE: Staples will be removed at follow up office visit.  Please pack wound with 1/4' plain packing daily, cover with dressing and paper tape.  BATHING: Please do not submerge wound underwater. You may shower and/or sponge bathe.  ACTIVITY: No heavy lifting anything more than 10-15lbs or straining. Otherwise, you may return to your usual level of physical activity. If you are taking narcotic pain medication (such as Percocet), do NOT drive a car, operate machinery or make important decisions.  DIET: Low fiber diet  NOTIFY YOUR SURGEON IF: You have any bleeding that does not stop, any pus draining from your wound, any fever (over 100.4 F) or chills, persistent nausea/vomiting with inability to tolerate food or liquids, persistent diarrhea, or if your pain is not controlled on your discharge pain medications.  FOLLOW-UP:  1. Please call to make a follow-up appointment within one week of discharge   2. Please follow up with your primary care physician in one week regarding your hospitalization.      SECONDARY DISCHARGE DIAGNOSES  Diagnosis: B-cell lymphoma  Assessment and Plan of Treatment: Continue home medications as prescribed and please follow up with your oncologist within 1-2 weeks of discharge

## 2019-11-30 ENCOUNTER — TRANSCRIPTION ENCOUNTER (OUTPATIENT)
Age: 69
End: 2019-11-30

## 2019-11-30 VITALS
OXYGEN SATURATION: 98 % | DIASTOLIC BLOOD PRESSURE: 87 MMHG | RESPIRATION RATE: 18 BRPM | HEART RATE: 78 BPM | SYSTOLIC BLOOD PRESSURE: 153 MMHG | TEMPERATURE: 98 F

## 2019-11-30 LAB
ALBUMIN SERPL ELPH-MCNC: 3.2 G/DL — LOW (ref 3.3–5)
ALP SERPL-CCNC: 72 U/L — SIGNIFICANT CHANGE UP (ref 40–120)
ALT FLD-CCNC: 31 U/L — SIGNIFICANT CHANGE UP (ref 10–45)
ANION GAP SERPL CALC-SCNC: 11 MMOL/L — SIGNIFICANT CHANGE UP (ref 5–17)
AST SERPL-CCNC: 18 U/L — SIGNIFICANT CHANGE UP (ref 10–40)
BILIRUB SERPL-MCNC: 0.1 MG/DL — LOW (ref 0.2–1.2)
BUN SERPL-MCNC: 22 MG/DL — SIGNIFICANT CHANGE UP (ref 7–23)
CA-I BLD-SCNC: 1.19 MMOL/L — SIGNIFICANT CHANGE UP (ref 1.12–1.3)
CALCIUM SERPL-MCNC: 8.8 MG/DL — SIGNIFICANT CHANGE UP (ref 8.4–10.5)
CHLORIDE SERPL-SCNC: 101 MMOL/L — SIGNIFICANT CHANGE UP (ref 96–108)
CO2 SERPL-SCNC: 25 MMOL/L — SIGNIFICANT CHANGE UP (ref 22–31)
CREAT SERPL-MCNC: 0.76 MG/DL — SIGNIFICANT CHANGE UP (ref 0.5–1.3)
GLUCOSE SERPL-MCNC: 125 MG/DL — HIGH (ref 70–99)
HCT VFR BLD CALC: 26 % — LOW (ref 39–50)
HGB BLD-MCNC: 8.2 G/DL — LOW (ref 13–17)
MAGNESIUM SERPL-MCNC: 2 MG/DL — SIGNIFICANT CHANGE UP (ref 1.6–2.6)
MCHC RBC-ENTMCNC: 26 PG — LOW (ref 27–34)
MCHC RBC-ENTMCNC: 31.5 GM/DL — LOW (ref 32–36)
MCV RBC AUTO: 82.5 FL — SIGNIFICANT CHANGE UP (ref 80–100)
PHOSPHATE SERPL-MCNC: 3.6 MG/DL — SIGNIFICANT CHANGE UP (ref 2.5–4.5)
PLATELET # BLD AUTO: 198 K/UL — SIGNIFICANT CHANGE UP (ref 150–400)
POTASSIUM SERPL-MCNC: 4.2 MMOL/L — SIGNIFICANT CHANGE UP (ref 3.5–5.3)
POTASSIUM SERPL-SCNC: 4.2 MMOL/L — SIGNIFICANT CHANGE UP (ref 3.5–5.3)
PREALB SERPL-MCNC: 20 MG/DL — SIGNIFICANT CHANGE UP (ref 20–40)
PROT SERPL-MCNC: 5.6 G/DL — LOW (ref 6–8.3)
RBC # BLD: 3.15 M/UL — LOW (ref 4.2–5.8)
RBC # FLD: 23.8 % — HIGH (ref 10.3–14.5)
SODIUM SERPL-SCNC: 137 MMOL/L — SIGNIFICANT CHANGE UP (ref 135–145)
TRIGL SERPL-MCNC: 189 MG/DL — HIGH (ref 10–149)
WBC # BLD: 6.49 K/UL — SIGNIFICANT CHANGE UP (ref 3.8–10.5)
WBC # FLD AUTO: 6.49 K/UL — SIGNIFICANT CHANGE UP (ref 3.8–10.5)

## 2019-11-30 PROCEDURE — 85027 COMPLETE CBC AUTOMATED: CPT

## 2019-11-30 PROCEDURE — 83690 ASSAY OF LIPASE: CPT

## 2019-11-30 PROCEDURE — 83605 ASSAY OF LACTIC ACID: CPT

## 2019-11-30 PROCEDURE — 36569 INSJ PICC 5 YR+ W/O IMAGING: CPT

## 2019-11-30 PROCEDURE — 88341 IMHCHEM/IMCYTCHM EA ADD ANTB: CPT

## 2019-11-30 PROCEDURE — 86901 BLOOD TYPING SEROLOGIC RH(D): CPT

## 2019-11-30 PROCEDURE — 88360 TUMOR IMMUNOHISTOCHEM/MANUAL: CPT

## 2019-11-30 PROCEDURE — C1889: CPT

## 2019-11-30 PROCEDURE — 93005 ELECTROCARDIOGRAM TRACING: CPT

## 2019-11-30 PROCEDURE — 80053 COMPREHEN METABOLIC PANEL: CPT

## 2019-11-30 PROCEDURE — 83036 HEMOGLOBIN GLYCOSYLATED A1C: CPT

## 2019-11-30 PROCEDURE — 96374 THER/PROPH/DIAG INJ IV PUSH: CPT | Mod: XU

## 2019-11-30 PROCEDURE — 80061 LIPID PANEL: CPT

## 2019-11-30 PROCEDURE — 85610 PROTHROMBIN TIME: CPT

## 2019-11-30 PROCEDURE — 99285 EMERGENCY DEPT VISIT HI MDM: CPT | Mod: 25

## 2019-11-30 PROCEDURE — 86900 BLOOD TYPING SEROLOGIC ABO: CPT

## 2019-11-30 PROCEDURE — 99232 SBSQ HOSP IP/OBS MODERATE 35: CPT

## 2019-11-30 PROCEDURE — 83735 ASSAY OF MAGNESIUM: CPT

## 2019-11-30 PROCEDURE — 88305 TISSUE EXAM BY PATHOLOGIST: CPT

## 2019-11-30 PROCEDURE — 81003 URINALYSIS AUTO W/O SCOPE: CPT

## 2019-11-30 PROCEDURE — C1751: CPT

## 2019-11-30 PROCEDURE — 84100 ASSAY OF PHOSPHORUS: CPT

## 2019-11-30 PROCEDURE — 80048 BASIC METABOLIC PNL TOTAL CA: CPT

## 2019-11-30 PROCEDURE — 88307 TISSUE EXAM BY PATHOLOGIST: CPT

## 2019-11-30 PROCEDURE — 84478 ASSAY OF TRIGLYCERIDES: CPT

## 2019-11-30 PROCEDURE — 85730 THROMBOPLASTIN TIME PARTIAL: CPT

## 2019-11-30 PROCEDURE — 84134 ASSAY OF PREALBUMIN: CPT

## 2019-11-30 PROCEDURE — 88342 IMHCHEM/IMCYTCHM 1ST ANTB: CPT

## 2019-11-30 PROCEDURE — 82962 GLUCOSE BLOOD TEST: CPT

## 2019-11-30 PROCEDURE — 74177 CT ABD & PELVIS W/CONTRAST: CPT

## 2019-11-30 PROCEDURE — 71045 X-RAY EXAM CHEST 1 VIEW: CPT

## 2019-11-30 PROCEDURE — 86850 RBC ANTIBODY SCREEN: CPT

## 2019-11-30 PROCEDURE — C1758: CPT

## 2019-11-30 PROCEDURE — 82330 ASSAY OF CALCIUM: CPT

## 2019-11-30 RX ORDER — ASPIRIN/CALCIUM CARB/MAGNESIUM 324 MG
81 TABLET ORAL DAILY
Refills: 0 | Status: DISCONTINUED | OUTPATIENT
Start: 2019-11-30 | End: 2019-11-30

## 2019-11-30 RX ORDER — ALLOPURINOL 300 MG
100 TABLET ORAL DAILY
Refills: 0 | Status: DISCONTINUED | OUTPATIENT
Start: 2019-11-30 | End: 2019-11-30

## 2019-11-30 RX ORDER — I.V. FAT EMULSION 20 G/100ML
8.3 EMULSION INTRAVENOUS
Qty: 20 | Refills: 0 | Status: DISCONTINUED | OUTPATIENT
Start: 2019-11-30 | End: 2019-11-30

## 2019-11-30 RX ORDER — ELECTROLYTE SOLUTION,INJ
1 VIAL (ML) INTRAVENOUS
Refills: 0 | Status: DISCONTINUED | OUTPATIENT
Start: 2019-11-30 | End: 2019-11-30

## 2019-11-30 RX ORDER — ATORVASTATIN CALCIUM 80 MG/1
40 TABLET, FILM COATED ORAL AT BEDTIME
Refills: 0 | Status: DISCONTINUED | OUTPATIENT
Start: 2019-11-30 | End: 2019-11-30

## 2019-11-30 RX ORDER — PANTOPRAZOLE SODIUM 20 MG/1
40 TABLET, DELAYED RELEASE ORAL
Refills: 0 | Status: DISCONTINUED | OUTPATIENT
Start: 2019-11-30 | End: 2019-11-30

## 2019-11-30 RX ORDER — METOCLOPRAMIDE HCL 10 MG
5 TABLET ORAL
Refills: 0 | Status: DISCONTINUED | OUTPATIENT
Start: 2019-11-30 | End: 2019-11-30

## 2019-11-30 RX ORDER — LEVOTHYROXINE SODIUM 125 MCG
100 TABLET ORAL DAILY
Refills: 0 | Status: DISCONTINUED | OUTPATIENT
Start: 2019-11-30 | End: 2019-11-30

## 2019-11-30 RX ORDER — OXYCODONE HYDROCHLORIDE 5 MG/1
1 TABLET ORAL
Qty: 16 | Refills: 0
Start: 2019-11-30 | End: 2019-12-03

## 2019-11-30 RX ORDER — LOSARTAN POTASSIUM 100 MG/1
100 TABLET, FILM COATED ORAL DAILY
Refills: 0 | Status: DISCONTINUED | OUTPATIENT
Start: 2019-11-30 | End: 2019-11-30

## 2019-11-30 RX ADMIN — Medication 650 MILLIGRAM(S): at 06:28

## 2019-11-30 RX ADMIN — Medication 650 MILLIGRAM(S): at 00:30

## 2019-11-30 RX ADMIN — LOSARTAN POTASSIUM 100 MILLIGRAM(S): 100 TABLET, FILM COATED ORAL at 17:16

## 2019-11-30 RX ADMIN — Medication 650 MILLIGRAM(S): at 12:59

## 2019-11-30 RX ADMIN — Medication 650 MILLIGRAM(S): at 17:14

## 2019-11-30 RX ADMIN — Medication 100 MILLIGRAM(S): at 13:00

## 2019-11-30 RX ADMIN — Medication 15 MILLIGRAM(S): at 00:27

## 2019-11-30 RX ADMIN — Medication 5 MILLIGRAM(S): at 00:26

## 2019-11-30 RX ADMIN — Medication 15 MILLIGRAM(S): at 06:29

## 2019-11-30 RX ADMIN — Medication 15 MILLIGRAM(S): at 06:32

## 2019-11-30 RX ADMIN — Medication 650 MILLIGRAM(S): at 17:44

## 2019-11-30 RX ADMIN — Medication 650 MILLIGRAM(S): at 06:32

## 2019-11-30 RX ADMIN — PANTOPRAZOLE SODIUM 40 MILLIGRAM(S): 20 TABLET, DELAYED RELEASE ORAL at 17:16

## 2019-11-30 RX ADMIN — Medication 650 MILLIGRAM(S): at 13:29

## 2019-11-30 RX ADMIN — CHLORHEXIDINE GLUCONATE 1 APPLICATION(S): 213 SOLUTION TOPICAL at 13:01

## 2019-11-30 RX ADMIN — Medication 650 MILLIGRAM(S): at 00:27

## 2019-11-30 RX ADMIN — ENOXAPARIN SODIUM 40 MILLIGRAM(S): 100 INJECTION SUBCUTANEOUS at 13:00

## 2019-11-30 RX ADMIN — Medication 15 MILLIGRAM(S): at 00:30

## 2019-11-30 RX ADMIN — Medication 5 MILLIGRAM(S): at 06:27

## 2019-11-30 NOTE — DISCHARGE NOTE NURSING/CASE MANAGEMENT/SOCIAL WORK - NSCORESITESY/N_GEN_A_CORE_RD
Reason For Visit  Chrissy Cohen is an established patient here today for a chief complaint of CHECK UP , BLOOD TEST. :  services not used. A chaperone is not applicable. He is unaccompanied. Quality    Adult Wellness CI height documented, discussion of regular exercise, exercising regularly, printed information given for activities, discussion of nutritional quality of diet, patient education given about proper diet, not using alcohol, not having considered quitting drinking, not getting angry when talked to about drinking, not having a drink or two in the morning to get going, not drinking alcohol regularly, and feeling guilty about it, colonoscopy performed: 01/01/2015, colonoscopy normal, no tobacco use, did not provide intervention and counseling in regards to tobacco use, does not have feelings of hopelessness (PHQ-2), no Anhedonia (PHQ-2), not referred to local mental health center, not taking medication for depression, no monitoring patient, preventive medicine therapy for influenza, pain scale level not reviewed, has not fallen within the last 12 months, able to walk, surrogate decision maker documented, taking aspirin, discussion of risks and benefits of Aspirin and no medications withdrawn because of contraindication. History of Present Illness  Patient is here for checkup. He states that in December 2017 he developed shingles of his upper back. With the shingles he had severe precordial chest pain, he underwent chest x-ray and CT of the chest. He states those results were normal. He was in [facility] during this time. He has lost 10 pounds over the last 1 year. Patient with complaint of a tickle in his throat. He states the tickle is constant and has been there for several months. He did not notice that his voice has changed. Patient has never smoked. He denies fevers or chills.    He denies cough, hemoptysis, dyspnea on exertion, shortness of breath, palpitations or lightheadedness. Review of Systems    Const: recent 10 weight loss, but no night sweats. Allergy & Immunology: not seasonal allergies. Eyes: no change in vision and no loss of vision. ENT: hoarseness, but no nasal passage blockage, no nasal discharge, no sore throat and no dysphagia. CV: no chest pain, no palpitations, no lightheadedness, no lower extremity edema and no dyspnea on exertion. Resp: no cough, not expressed as feeling short of breath, no wheezing and no hemoptysis. Breast: no pain in breast.   GI: no bowel habit changes. : erectile dysfunction. Endo: no intolerance to cold and no intolerance to heat. Heme/Lymph: no tendency for easy bleeding. Musc: no joint pain. Neuro: no confusion, no dizziness and no headache. Psych: no anxiety and no depression. Skin: Normal.       Current Meds   1. Aspirin 81 MG Oral Tablet Delayed Release; TAKE 1 TABLET DAILY; Therapy: [id number] to Recorded   2. Atorvastatin Calcium 20 MG Oral Tablet; TAKE 1 TABLET AT BEDTIME; Therapy: [id number] to (Evaluate:[id number]); Last Rx:[id number] Ordered   3. Dorzolamide HCl-Timolol Mal 22.3-6.8 MG/ML Ophthalmic Solution; INSTILL 1 DROP   INTO LEFT EYE 2 TIMES DAILY; Therapy: (Recorded:[id number]) to Recorded   4. Lumigan 0.01 % Ophthalmic Solution; INSTILL 1 DROP INTO AFFECTED EYES ONCE   DAILY IN THE EVENING; Therapy: (Recorded:[id number]) to Recorded   5. Meloxicam 15 MG Oral Tablet; TAKE 1 TABLET DAILY; Therapy: [id number] to Recorded   6. Viagra 100 MG Oral Tablet; TAKE 1 TABLET DAILY 1 HOUR BEFORE NEEDED; Therapy: [id number] to (37 [phone])  Requested for: 28Oct2016; Last   Rx:28Oct2016 Ordered    Active Problems  Erectile dysfunction (N52.9)  Hyperlipidemia (E78.5)  Need for influenza vaccination (Z23)    Surgical History  History of Knee Replacement   Â· right 2017    Social History    Living Environment and Social Support: lives alone. Social Support: child(esau) and friend(s). retired [employer]. Lifestyle: consumes a diverse and healthy diet, does not have any weight concerns, exercises regularly, does not use tobacco, denies alcohol use and denies drug use. Preferred Language: English. Vitals  Signs   Recorded: [id number] 08:58AM   Height: 5 ft 9 in  Weight: 191 lb 12.80 oz  BMI Calculated: 28.32  BSA Calculated: 7.44  Systolic: 114, RUE, Sitting  Diastolic: 80, RUE, Sitting  Heart Rate: 63  O2 Saturation: 98  Pain Scale: 00  Blood pressure 132/80, heart rate 63, respiratory rate 18, O2 sat 98% on room air. Pupils equal reactive to light and accommodation EOMIs full sclerae anicteric  Neck supple without adenopathy, no thyroid nodules palpated. Lungs clear to A&P  Cor S1-S2 no V5-7 murmur click or rub no JVD no bruit  Abdomen soft nontender nondistended bowel sounds positive no hepatosplenomegaly no mass  Extremities without clubbing cyanosis or edema  Pulses 2+  Strength is normal  Gait is normal  Patient is neurologically intact  Patient is awake alert and oriented Ãâ3  No skin lesions     Immunizations  Influenza --- Christiana Shelby: [id number] Robe: 22-Sep-2017     Assessment  Encounter for preventive health examination (Z00.00)  Hyperlipidemia (E78.5)  Erectile dysfunction (N52.9)  Hoarseness of voice (R49.0)    Plan  Atorvastatin Calcium 20 MG Oral Tablet (Lipitor); TAKE 1 TABLET AT BEDTIME  Sildenafil Citrate 100 MG Oral Tablet (Viagra); TAKE 1 TABLET DAILY 1 HOUR  BEFORE NEEDED  COMP METABOLIC PANEL WITH CBCA, LIPID PANEL AND TSH (CMP,CBCA,LIPFA,TSH); Status:Active; Requested for:[id number];   US THYROID; Status:Active - Perform Order; Requested for:[id number]; Pneumovax 23 25 MCG/0.5ML Injection Injectable  Plans:     Plan:   Have labs today  Schedule thyroid ultrasound. Follow-up in the office in 6 month(s). Discussion/Summary  Patient is 9 months status post episode of zoster. He states he has a tickle in his throat and he has not noticed a change in his voice. Patient's voice is hoarse. He has also had a 10 pound weight loss. Will sign consent for release of records for hospital in [place] for chest x-ray and chest CT results. We will also check TSH and thyroid ultrasound for posterior goiter. Patient has hyperlipidemia and is due for lipid panel and CMP, CBC with differential also ordered. Pneumococcal immunization given. RTC 6 months      Signatures   Electronically signed by : Regan Stringer;  Aug 14 2018  9:02AM CST    Electronically signed by : Rick Parsons M.D.; Aug 14 2018  9:26AM CST No

## 2019-11-30 NOTE — CHART NOTE - NSCHARTNOTEFT_GEN_A_CORE
Brief Note. TPN discontinued. Low Fiber education provided for discharge.    Chart reviewed, events noted. 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 TPN initiated 11/21. Diet advanced to Low Fiber on 11/29. Plan to discontinue TPN tonight, prior to discharge. Pt is tolerating balanced meals with no GI distress, +flatus, no BM thus far.    Provided Low Fiber diet education to pt and daughter. Pt accepted written education handout: Reduced Fiber Nutrition Therapy.     Diet : Low Fiber    Parenteral Nutrition: TPN (ordered 11/29) infusing at 83ml/hr (105Gm amino acids, 280Gm dextrose, 20Gm SMOF lipids) to provide 1575cal/day (24cal/Kg and 1.6Gm protein/Kg per dosing wt 64.9Kg); with 10ml MVI, 3ml MTE-5. Non-protein calories = 1155 vicky/day (18cal/Kg).     Dextrose Infusion Rate: 3 mg/Kg/min  Lipid Infusion Rate: 0.3 Gm/Kg/day; 0.03 Gm/Kg/hr (reduced for elevated triglycerides)      Labs/Meds reviewed.    Recommend  1) Continue Low Fiber diet; follow up with MD after discharge for diet modification as warranted.  2) Therapeutic diet education provided prior to discharge.      RD remains available upon request and will follow up per protocol.    Marita Ponce, MS RD CDN [facility], Pager # [phone]

## 2019-11-30 NOTE — PROGRESS NOTE ADULT - SUBJECTIVE AND OBJECTIVE BOX
[facility] NUTRITION SUPPORT--  Attending/ PA FOLLOW UP NOTE      24 hour events/subjective: Denies Palpitations, chest pain, shortness of breath. Denies nausea nor vomiting nor abdominal pain.        PAST HISTORY  --------------------------------------------------------------------------------  No significant changes to PMH, PSH, FHx, SHx, unless otherwise noted    ALLERGIES & MEDICATIONS  --------------------------------------------------------------------------------  Allergies    No Known Allergies    Intolerances      Standing Inpatient Medications  acetaminophen   Tablet .. 650 milliGRAM(s) Oral every 6 hours  chlorhexidine 2% Cloths 1 Application(s) Topical daily  enoxaparin Injectable 40 milliGRAM(s) SubCutaneous daily  fat emulsion (Fish Oil and Plant Based) 20% Infusion 8.3 mL/Hr IV Continuous <Continuous>  influenza   Vaccine 0.5 milliLiter(s) IntraMuscular once  ketorolac   Injectable 15 milliGRAM(s) IV Push every 6 hours  levothyroxine Injectable 50 MICROGram(s) IV Push at bedtime  metoprolol tartrate Injectable 5 milliGRAM(s) IV Push every 6 hours  pantoprazole  Injectable 40 milliGRAM(s) IV Push daily  Parenteral Nutrition - Adult 1 Each TPN Continuous <Continuous>    PRN Inpatient Medications  morphine  - Injectable 2 milliGRAM(s) IV Push every 4 hours PRN  morphine  - Injectable 1 milliGRAM(s) IV Push every 4 hours PRN  ondansetron Injectable 4 milliGRAM(s) IV Push every 6 hours PRN      REVIEW OF SYSTEMS  --------------------------------------------------------------------------------  Gen: as per HPI  Skin: No rashes  Head/Eyes/Ears/Mouth: No headache;No sore throat  Respiratory: No dyspnea, cough,   CV: No chest pain, PND, orthopnea  GI: as per HPI  : No increased frequency, dysuria, hematuria, nocturia  MSK: No joint pain/swelling; no back pain; no edema  Neuro: No dizziness/lightheadedness, weakness, seizures, numbness, tingling  Psych: No significant nervousness, anxiety, stress, depression    All other systems were reviewed and are negative, except as noted.      LABS/STUDIES  --------------------------------------------------------------------------------              8.2    5.99  >-----------<  176      [11-29-19 @ 08:33]              26.6     137  |  101  |  22  ----------------------------<  125      [11-30-19 @ 07:05]  4.2   |  25  |  0.76        Ca     8.8     [11-30-19 @ 07:05]      iCa    1.24     [11-29 @ 07:05]      Mg     2.0     [11-30-19 @ 07:05]      Phos  3.6     [11-30-19 @ 07:05]    TPro  5.6  /  Alb  3.2  /  TBili  0.1  /  DBili  x   /  AST  18  /  ALT  31  /  AlkPhos  72  [11-30-19 @ 07:05]            Glucose, Serum: 125 mg/dL (11-30-19 @ 07:05)  Glucose, Serum: 100 mg/dL (11-29-19 @ 08:47)    PrealbuminPrealbumin, Serum: 22 mg/dL (11-26-19 @ 07:29)  Prealbumin, Serum: 21 mg/dL (11-22-19 @ 08:19)    Triglycerides      11-29-19 @ 07:01  -  11-30-19 @ 07:00  --------------------------------------------------------  IN: 2600.3 mL / OUT: 1700 mL / NET: 900.3 mL        VITALS/PHYSICAL EXAM  --------------------------------------------------------------------------------  T(C): 36.8 (11-30-19 @ 04:55), Max: 37 (11-29-19 @ 21:38)  HR: 64 (11-30-19 @ 04:55) (57 - 70)  BP: 156/89 (11-30-19 @ 04:55) (131/78 - 164/70)  RR: 18 (11-30-19 @ 04:55) (18 - 18)  SpO2: 97% (11-30-19 @ 04:55) (96% - 99%)  Wt(kg): --    Physical Exam:    Gen:     NAD, A&Ox3. WN/WD/WG  	HEENT: NC/AT. PERRL,               Neck: trachea midline              Chest: non labored breathing  	Abd: softly distended, appropriately tender                  dressing c/d/i              MSK; FROMx4, no deformities              Vascular: Warm, no clubbing, cyanosis, nor edema                   LUE PICC dressing c/d/i  	Neuro: No focal deficits, sensation & strength grossly intact  	Psych: Normal affect and mood  	Skin: Warm, without rashes, good turgor  .  .  . [facility] NUTRITION SUPPORT--  Attending/ PA FOLLOW UP NOTE      24 hour events/subjective: Pt on TPN since 11/21 for nutritional support, tolerating without issues, and denies palpitations, chest pain, shortness of breath. Pt advanced to LRD yesterday and tolerating, denies nausea nor vomiting nor abdominal pain.        PAST HISTORY  --------------------------------------------------------------------------------  No significant changes to PMH, PSH, FHx, SHx, unless otherwise noted    ALLERGIES & MEDICATIONS  --------------------------------------------------------------------------------  Allergies    No Known Allergies    Intolerances      Standing Inpatient Medications  acetaminophen   Tablet .. 650 milliGRAM(s) Oral every 6 hours  chlorhexidine 2% Cloths 1 Application(s) Topical daily  enoxaparin Injectable 40 milliGRAM(s) SubCutaneous daily  fat emulsion (Fish Oil and Plant Based) 20% Infusion 8.3 mL/Hr IV Continuous <Continuous>  influenza   Vaccine 0.5 milliLiter(s) IntraMuscular once  ketorolac   Injectable 15 milliGRAM(s) IV Push every 6 hours  levothyroxine Injectable 50 MICROGram(s) IV Push at bedtime  metoprolol tartrate Injectable 5 milliGRAM(s) IV Push every 6 hours  pantoprazole  Injectable 40 milliGRAM(s) IV Push daily  Parenteral Nutrition - Adult 1 Each TPN Continuous <Continuous>    PRN Inpatient Medications  morphine  - Injectable 2 milliGRAM(s) IV Push every 4 hours PRN  morphine  - Injectable 1 milliGRAM(s) IV Push every 4 hours PRN  ondansetron Injectable 4 milliGRAM(s) IV Push every 6 hours PRN      REVIEW OF SYSTEMS  --------------------------------------------------------------------------------  Gen: as per HPI  Skin: No rashes  Head/Eyes/Ears/Mouth: No headache;No sore throat  Respiratory: No dyspnea, cough,   CV: No chest pain, PND, orthopnea  GI: as per HPI  : No increased frequency, dysuria, hematuria, nocturia  MSK: No joint pain/swelling; no back pain; no edema  Neuro: No dizziness/lightheadedness, weakness, seizures, numbness, tingling  Psych: No significant nervousness, anxiety, stress, depression    All other systems were reviewed and are negative, except as noted.      LABS/STUDIES  --------------------------------------------------------------------------------              8.2    5.99  >-----------<  176      [11-29-19 @ 08:33]              26.6     137  |  101  |  22  ----------------------------<  125      [11-30-19 @ 07:05]  4.2   |  25  |  0.76        Ca     8.8     [11-30-19 @ 07:05]      iCa    1.24     [11-29 @ 07:05]      Mg     2.0     [11-30-19 @ 07:05]      Phos  3.6     [11-30-19 @ 07:05]    TPro  5.6  /  Alb  3.2  /  TBili  0.1  /  DBili  x   /  AST  18  /  ALT  31  /  AlkPhos  72  [11-30-19 @ 07:05]            Glucose, Serum: 125 mg/dL (11-30-19 @ 07:05)  Glucose, Serum: 100 mg/dL (11-29-19 @ 08:47)    PrealbuminPrealbumin, Serum: 22 mg/dL (11-26-19 @ 07:29)  Prealbumin, Serum: 21 mg/dL (11-22-19 @ 08:19)    Triglycerides      11-29-19 @ 07:01  -  11-30-19 @ 07:00  --------------------------------------------------------  IN: 2600.3 mL / OUT: 1700 mL / NET: 900.3 mL        VITALS/PHYSICAL EXAM  --------------------------------------------------------------------------------  T(C): 36.8 (11-30-19 @ 04:55), Max: 37 (11-29-19 @ 21:38)  HR: 64 (11-30-19 @ 04:55) (57 - 70)  BP: 156/89 (11-30-19 @ 04:55) (131/78 - 164/70)  RR: 18 (11-30-19 @ 04:55) (18 - 18)  SpO2: 97% (11-30-19 @ 04:55) (96% - 99%)  Wt(kg): --    Physical Exam:    Gen:     NAD, A&Ox3. WN/WD/WG  	HEENT: NC/AT. PERRL,               Neck: trachea midline              Chest: non labored breathing  	Abd: softly distended, appropriately tender                  dressing c/d/i              MSK; FROMx4, no deformities              Vascular: Warm, no clubbing, cyanosis, nor edema                   LUE PICC dressing c/d/i  	Neuro: No focal deficits, sensation & strength grossly intact  	Psych: Normal affect and mood  	Skin: Warm, without rashes, good turgor  .  .  . [facility] NUTRITION SUPPORT--  Attending/ PA FOLLOW UP NOTE      24 hour events/subjective: Pt on TPN since 11/21 for nutritional support, tolerating without issues, and denies palpitations, chest pain, shortness of breath. Pt advanced to LRD yesterday and tolerating, denies nausea nor vomiting nor abdominal pain.        PAST HISTORY  --------------------------------------------------------------------------------  No significant changes to PMH, PSH, FHx, SHx, unless otherwise noted    ALLERGIES & MEDICATIONS  --------------------------------------------------------------------------------  Allergies    No Known Allergies    Intolerances      Standing Inpatient Medications  acetaminophen   Tablet .. 650 milliGRAM(s) Oral every 6 hours  chlorhexidine 2% Cloths 1 Application(s) Topical daily  enoxaparin Injectable 40 milliGRAM(s) SubCutaneous daily  fat emulsion (Fish Oil and Plant Based) 20% Infusion 8.3 mL/Hr IV Continuous <Continuous>  influenza   Vaccine 0.5 milliLiter(s) IntraMuscular once  ketorolac   Injectable 15 milliGRAM(s) IV Push every 6 hours  levothyroxine Injectable 50 MICROGram(s) IV Push at bedtime  metoprolol tartrate Injectable 5 milliGRAM(s) IV Push every 6 hours  pantoprazole  Injectable 40 milliGRAM(s) IV Push daily  Parenteral Nutrition - Adult 1 Each TPN Continuous <Continuous>    PRN Inpatient Medications  morphine  - Injectable 2 milliGRAM(s) IV Push every 4 hours PRN  morphine  - Injectable 1 milliGRAM(s) IV Push every 4 hours PRN  ondansetron Injectable 4 milliGRAM(s) IV Push every 6 hours PRN      REVIEW OF SYSTEMS  --------------------------------------------------------------------------------  Gen: as per HPI  Skin: No rashes  Head/Eyes/Ears/Mouth: No headache;No sore throat  Respiratory: No dyspnea, cough,   CV: No chest pain, PND, orthopnea  GI: as per HPI  : No increased frequency, dysuria, hematuria, nocturia  MSK: No joint pain/swelling; no back pain; no edema  Neuro: No dizziness/lightheadedness, weakness, seizures, numbness, tingling  Psych: No significant nervousness, anxiety, stress, depression    All other systems were reviewed and are negative, except as noted.      LABS/STUDIES  --------------------------------------------------------------------------------              8.2    5.99  >-----------<  176      [11-29-19 @ 08:33]              26.6     137  |  101  |  22  ----------------------------<  125      [11-30-19 @ 07:05]  4.2   |  25  |  0.76        Ca     8.8     [11-30-19 @ 07:05]      iCa    1.24     [11-29 @ 07:05]      Mg     2.0     [11-30-19 @ 07:05]      Phos  3.6     [11-30-19 @ 07:05]    TPro  5.6  /  Alb  3.2  /  TBili  0.1  /  DBili  x   /  AST  18  /  ALT  31  /  AlkPhos  72  [11-30-19 @ 07:05]            Glucose, Serum: 125 mg/dL (11-30-19 @ 07:05)  Glucose, Serum: 100 mg/dL (11-29-19 @ 08:47)    PrealbuminPrealbumin, Serum: 22 mg/dL (11-26-19 @ 07:29)  Prealbumin, Serum: 21 mg/dL (11-22-19 @ 08:19)    Triglycerides      11-29-19 @ 07:01  -  11-30-19 @ 07:00  --------------------------------------------------------  IN: 2600.3 mL / OUT: 1700 mL / NET: 900.3 mL        VITALS/PHYSICAL EXAM  --------------------------------------------------------------------------------  T(C): 36.8 (11-30-19 @ 04:55), Max: 37 (11-29-19 @ 21:38)  HR: 64 (11-30-19 @ 04:55) (57 - 70)  BP: 156/89 (11-30-19 @ 04:55) (131/78 - 164/70)  RR: 18 (11-30-19 @ 04:55) (18 - 18)  SpO2: 97% (11-30-19 @ 04:55) (96% - 99%)  Wt(kg): --    Physical Exam:    Gen:     NAD, A&Ox3. WN/WD  	HEENT: NC/AT. PERRL,               Neck: trachea midline, no JVD              Chest: non labored breathing  	Abd: soft, nontender, mildly distended, midline incision CDI, dressing plain packing in between staples replaced.              MSK; FROMx4, no deformities              Vascular: Warm, no clubbing, cyanosis, nor edema                   LUE PICC dressing c/d/i  	Neuro: No focal deficits, sensation & strength grossly intact  	Psych: Normal affect and mood  	Skin: Warm, without rashes, good turgor  .  .  . [facility] NUTRITION SUPPORT--  Attending/ PA FOLLOW UP NOTE      24 hour events/subjective: Pt on TPN since 11/21 for nutritional support, tolerating without issues, and denies palpitations, chest pain, shortness of breath. Pt advanced to LRD yesterday and tolerating,- denies nausea nor vomiting nor abdominal pain.        PAST HISTORY  --------------------------------------------------------------------------------  No significant changes to PMH, PSH, FHx, SHx, unless otherwise noted    ALLERGIES & MEDICATIONS  --------------------------------------------------------------------------------  Allergies    No Known Allergies    Intolerances      Standing Inpatient Medications  acetaminophen   Tablet .. 650 milliGRAM(s) Oral every 6 hours  chlorhexidine 2% Cloths 1 Application(s) Topical daily  enoxaparin Injectable 40 milliGRAM(s) SubCutaneous daily  fat emulsion (Fish Oil and Plant Based) 20% Infusion 8.3 mL/Hr IV Continuous <Continuous>  influenza   Vaccine 0.5 milliLiter(s) IntraMuscular once  ketorolac   Injectable 15 milliGRAM(s) IV Push every 6 hours  levothyroxine Injectable 50 MICROGram(s) IV Push at bedtime  metoprolol tartrate Injectable 5 milliGRAM(s) IV Push every 6 hours  pantoprazole  Injectable 40 milliGRAM(s) IV Push daily  Parenteral Nutrition - Adult 1 Each TPN Continuous <Continuous>    PRN Inpatient Medications  morphine  - Injectable 2 milliGRAM(s) IV Push every 4 hours PRN  morphine  - Injectable 1 milliGRAM(s) IV Push every 4 hours PRN  ondansetron Injectable 4 milliGRAM(s) IV Push every 6 hours PRN      REVIEW OF SYSTEMS  --------------------------------------------------------------------------------  Gen: as per HPI  Skin: No rashes  Head/Eyes/Ears/Mouth: No headache;No sore throat  Respiratory: No dyspnea, cough,   CV: No chest pain, PND, orthopnea  GI: as per HPI  : No increased frequency, dysuria, hematuria, nocturia  MSK: No joint pain/swelling; no back pain; no edema  Neuro: No dizziness/lightheadedness, weakness, seizures, numbness, tingling  Psych: No significant nervousness, anxiety, stress, depression    All other systems were reviewed and are negative, except as noted.      LABS/STUDIES  --------------------------------------------------------------------------------              8.2    5.99  >-----------<  176      [11-29-19 @ 08:33]              26.6     137  |  101  |  22  ----------------------------<  125      [11-30-19 @ 07:05]  4.2   |  25  |  0.76        Ca     8.8     [11-30-19 @ 07:05]      iCa    1.24     [11-29 @ 07:05]      Mg     2.0     [11-30-19 @ 07:05]      Phos  3.6     [11-30-19 @ 07:05]    TPro  5.6  /  Alb  3.2  /  TBili  0.1  /  DBili  x   /  AST  18  /  ALT  31  /  AlkPhos  72  [11-30-19 @ 07:05]            Glucose, Serum: 125 mg/dL (11-30-19 @ 07:05)  Glucose, Serum: 100 mg/dL (11-29-19 @ 08:47)    PrealbuminPrealbumin, Serum: 22 mg/dL (11-26-19 @ 07:29)  Prealbumin, Serum: 21 mg/dL (11-22-19 @ 08:19)    Triglycerides      11-29-19 @ 07:01  -  11-30-19 @ 07:00  --------------------------------------------------------  IN: 2600.3 mL / OUT: 1700 mL / NET: 900.3 mL        VITALS/PHYSICAL EXAM  --------------------------------------------------------------------------------  T(C): 36.8 (11-30-19 @ 04:55), Max: 37 (11-29-19 @ 21:38)  HR: 64 (11-30-19 @ 04:55) (57 - 70)  BP: 156/89 (11-30-19 @ 04:55) (131/78 - 164/70)  RR: 18 (11-30-19 @ 04:55) (18 - 18)  SpO2: 97% (11-30-19 @ 04:55) (96% - 99%)  Wt(kg): --    Physical Exam:    Gen:     NAD, A&Ox3. WN/WD  	HEENT: NC/AT. PERRL,               Neck: trachea midline, no JVD              Chest: non labored breathing  	Abd: soft, nontender, mildly distended, midline incision CDI, dressing plain packing in between staples replaced.              MSK; FROMx4, no deformities              Vascular: Warm, no clubbing, cyanosis, nor edema                   LUE PICC dressing c/d/i  	Neuro: No focal deficits, sensation & strength grossly intact  	Psych: Normal affect and mood  	Skin: Warm, without rashes, good turgor  .  .  .

## 2019-11-30 NOTE — PROGRESS NOTE ADULT - SUBJECTIVE AND OBJECTIVE BOX
INTERVAL HPI/OVERNIGHT EVENTS:  Patient is a 69yMale  + flatus, ivan diet, feels much better    Vital Signs Last 24 Hrs  T(C): 36.7 (30 Nov 2019 09:27), Max: 37 (29 Nov 2019 21:38)  T(F): 98.1 (30 Nov 2019 09:27), Max: 98.6 (29 Nov 2019 21:38)  HR: 78 (30 Nov 2019 09:27) (57 - 78)  BP: 124/67 (30 Nov 2019 09:27) (124/67 - 164/70)  BP(mean): --  RR: 18 (30 Nov 2019 09:27) (18 - 18)  SpO2: 97% (30 Nov 2019 04:55) (96% - 99%)  11-29 @ 07:01  -  11-30 @ 07:00  --------------------------------------------------------  IN: 2600.3 mL / OUT: 1700 mL / NET: 900.3 mL    11-30 @ 07:01  -  11-30 @ 09:53  --------------------------------------------------------  IN: 0 mL / OUT: 450 mL / NET: -450 mL        PHYSICAL EXAM:  Constitutional: well developed, well nourished, NAD  Eyes: anicteric  ENMT: normal facies, symmetric  Neck: supple  Respiratory: CTA bilat  Cardiovascular: RRR  Gastrointestinal: abdomen soft, nontender, nondistended. No obvious masses. No peritonitis, incision c/d/i  Extremities: FROM, warm  Neurological: intact, non-focal  Skin: no gross lesions  Lymph Nodes: no gross adenopathy  Musculoskeletal: equal strength bilateral upper and lower extremities  Psychiatric: oriented x 3; appropriate          LABS:                        8.2    5.99  )-----------( 176      ( 29 Nov 2019 08:33 )             26.6     11-30    137  |  101  |  22  ----------------------------<  125<H>  4.2   |  25  |  0.76    Ca    8.8      30 Nov 2019 07:05  Phos  3.6     11-30  Mg     2.0     11-30    TPro  5.6<L>  /  Alb  3.2<L>  /  TBili  0.1<L>  /  DBili  x   /  AST  18  /  ALT  31  /  AlkPhos  72  11-30        Magnesium, Serum: 2.0 mg/dL (11-30 @ 07:05)  Phosphorus Level, Serum: 3.6 mg/dL (11-30 @ 07:05)

## 2019-11-30 NOTE — DISCHARGE NOTE NURSING/CASE MANAGEMENT/SOCIAL WORK - PATIENT PORTAL LINK FT
You can access the FollowMyHealth Patient Portal offered by [facility] by registering at the following website: [email or web address]. By joining [employer]’s FollowMyHealth portal, you will also be able to view your health information using other applications (apps) compatible with our system.

## 2019-11-30 NOTE — PROGRESS NOTE ADULT - SUBJECTIVE AND OBJECTIVE BOX
RED SURGERY DAILY PROGRESS NOTE:       SUBJECTIVE/ROS: Patient seen and examined at bedside.  No acute overnight events.  Patient states he is passing gas. -BM He has been oob/ambulating and voiding appropriately. Counselled dc dc PICC diet TPN fu 1wk in Dr. Cunha's office advil tylenol    OBJECTIVE:    Vital Signs Last 24 Hrs  T(C): 36.7 (30 Nov 2019 09:27), Max: 37 (29 Nov 2019 21:38)  T(F): 98.1 (30 Nov 2019 09:27), Max: 98.6 (29 Nov 2019 21:38)  HR: 78 (30 Nov 2019 09:27) (57 - 78)  BP: 124/67 (30 Nov 2019 09:27) (124/67 - 164/70)  RR: 18 (30 Nov 2019 09:27) (18 - 18)  SpO2: 97% (30 Nov 2019 04:55) (96% - 99%)    PHYSICAL EXAM:  GEN: NAD, resting quietly  NEURO: AAOx3, CN II-XII grossly intact, no focal deficits  PULM: symmetric chest rise bilaterally, no increased WOB  ABD: soft, nontender, mildly distended, midline incision CDI, dressing replaced.    LABS:               8.2    6.49  )-----------( 198      ( 30 Nov 2019 10:27 )             26.0     11-30    137  |  101  |  22  ----------------------------<  125<H>  4.2   |  25  |  0.76    Ca    8.8      30 Nov 2019 07:05  Phos  3.6     11-30  Mg     2.0     11-30    TPro  5.6<L>  /  Alb  3.2<L>  /  TBili  0.1<L>  /  DBili  x   /  AST  18  /  ALT  31  /  AlkPhos  72  11-30

## 2019-11-30 NOTE — PROGRESS NOTE ADULT - REASON FOR ADMISSION
SBO

## 2019-11-30 NOTE — PROGRESS NOTE ADULT - ASSESSMENT
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  TPN recommendations as appropriate:   105 grams amino acids (700ml 15% a.a.)  280 grams dextrose (400ml 70% dex)  35 grams SMOFlipid (1751ml 20%IVFE @ 14.6ml/hr x 12 hours)    Total Calories: 1722 vicky/day (27cal/Kg per dosing wt 64.9Kg)  Total Protein: 105 Gm/day (1.6Gm/Kg per dosing wt 64.9Kg)  Micronutrients: 10ml MVI, 3ml MTE-5    Non-Protein Calories: 1302 vicky/day (20cal/Kg)  Dextrose Infusion Rate: 3 mg/Kg/min  Lipid Infusion Rate: 0.54 Gm/Kg/day; 0.04 Gm/Kg/hr      1. Dedicated Central line for TPN maintenance as per protocol  2. Strict Intake and Output.  3. Weights three times a week  4. Monitor BMP, Mg, Ionized Ca, Phosphorus daily  5. Check Triglycerides daily for first 3 days of TPN, then monthly   6. Pre-albumin weekly.  7. Fingersticks to monitor glucose every 6 hours until stable then may be decreased to twice a day,           coverage with ISS - to be ordered by primary team           Elevated serum glucose- HgA1c w/ am labs noted continue to monitor  8. Volume depletion - improving -Initiate TPN w/           NaCl 40mEq, NaAce 60mEq, NaPhos 30mMol, KCl 80mEq           HyperCa resolved w/ hydration - 10mEq Ca and 12mEqMg  9. Hem/ Onc appreciated  10. 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 Pt on TPN since 11/21, plan to 1/2 concentration and eventually DC if pt conitnues to tolerate LRD without issues, and able to meet Nutritional goals, enterally.    TPN tolerated at GOAL AA & Dextrose & SMOF 20g, Lipids decreased back to 20g given triglycerides increasing.    TPN recommendations as appropriate:   105 grams amino acids (700ml 15% a.a.)  280 grams dextrose (400ml 70% dex)  35 grams SMOF lipid (1751ml 20%IVFE @ 14.6ml/hr x 12 hours)    Total Calories: 1722 vicky/day (27cal/Kg per dosing wt 64.9Kg)  Total Protein: 105 Gm/day (1.6Gm/Kg per dosing wt 64.9Kg)  Micronutrients: 10ml MVI, 3ml MTE-5    Non-Protein Calories: 1302 vicky/day (20cal/Kg)      1. Dedicated Central line for TPN, maintenance as per protocol  2. Strict Intake and Output.  3. Weights three times a week  4. Monitor BMP, Mg, Ionized Ca, Phosphorus daily  5. Check Triglycerides daily. Continue to monitor, adjust lipids as necessary if pt continues on TPN pending toleration of LRD.  6. Pre-albumin weekly.  7. Fingersticks to monitor glucose every 6 hours until stable then may be decreased to twice a day,           coverage with ISS - to be ordered by primary team           Elevated serum glucose- HgA1c w/ am labs noted continue to monitor  8. Volume depletion - improving -Initiate TPN w/           NaCl 40mEq, NaAce 60mEq, NaPhos 30mMol, KCl 80mEq           HyperCa resolved w/ hydration - 10mEq Ca and 12mEqMg  9. Hem/ Onc appreciated  10. 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    Pt  w/ Protein-Calorie Malnutrition- consulted for TPN for Nutritional support. Pt on TPN since 11/21, plan to 1/2 concentration today and eventually DC if pt continues to tolerate LRD without issues, and is meeting Nutritional goals, enterally.    TPN tolerated at GOAL AA & Dextrose & SMOF 20g, Lipids decreased back to 20g given triglycerides increasing.    TPN recommendations as appropriate:   105 grams amino acids (700ml 15% a.a.)  280 grams dextrose (400ml 70% dex)  35 grams SMOF lipid (1751ml 20%IVFE @ 14.6ml/hr x 12 hours)    Total Calories: 1722 vicky/day (27cal/Kg per dosing wt 64.9Kg)  Total Protein: 105 Gm/day (1.6Gm/Kg per dosing wt 64.9Kg)  Micronutrients: 10ml MVI, 3ml MTE-5    Non-Protein Calories: 1302 vicky/day (20cal/Kg)      1. Dedicated Central line for TPN, maintenance as per protocol  2. Strict Intake and Output.  3. Weights three times a week  4. Monitor BMP, Mg, Ionized Ca, Phosphorus daily  5. Check Triglycerides daily. Continue to monitor, adjust lipids as necessary if pt continues on TPN pending toleration of LRD.  6. Pre-albumin weekly.  7. Fingersticks to monitor glucose every 6 hours until stable then may be decreased to twice a day,           coverage with ISS - to be ordered by primary team           Elevated serum glucose- HgA1c w/ am labs noted continue to monitor  8. Volume depletion - improving -Initiate TPN w/           NaCl 40mEq, NaAce 60mEq, NaPhos 30mMol, KCl 80mEq           HyperCa resolved w/ hydration - 10mEq Ca and 12mEqMg  9. Hem/ Onc appreciated  10. Hypertriglyceremia- stable with giving only 20g Lipid in TPN  Continue as per Surgery, will follow with you, D/w primary team      TPN team, pager [phone]  D/w Corey Montes De Oca

## 2019-11-30 NOTE — PROGRESS NOTE ADULT - ASSESSMENT
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 He is now s/p SBR and appendectomy 11/26, recovering appropriately on the floor.    Plan:  - LRD  - Pain control  - Encourage OOB and early ambulation  - Continue home medications, restart ASA POD 7  - F/u hematology, no chemotherapy while admitted  - Lovenox for dvt ppx  - Dc home today 11/30  - Fu in 1wk at Dr. Cunha's office    Red Team Surgery  [phone]

## 2019-12-02 PROBLEM — I25.10 ATHEROSCLEROTIC HEART DISEASE OF NATIVE CORONARY ARTERY WITHOUT ANGINA PECTORIS: Chronic | Status: ACTIVE | Noted: 2019-10-17

## 2019-12-04 ENCOUNTER — INBOUND DOCUMENT (OUTPATIENT)
Age: 69
End: 2019-12-04

## 2019-12-05 LAB — SURGICAL PATHOLOGY STUDY: SIGNIFICANT CHANGE UP

## 2019-12-11 ENCOUNTER — APPOINTMENT (OUTPATIENT)
Dept: COLORECTAL SURGERY | Facility: CLINIC | Age: 69
End: 2019-12-11
Payer: COMMERCIAL

## 2019-12-11 VITALS — TEMPERATURE: 98.4 F

## 2019-12-11 PROCEDURE — 99024 POSTOP FOLLOW-UP VISIT: CPT

## 2019-12-11 NOTE — REASON FOR VISIT
[Spouse] : spouse [Lymphoma] : lymphoma [Urgent Visit] : a urgent visit for [FreeTextEntry2] : abd pain , N/V and constipation

## 2019-12-11 NOTE — REVIEW OF SYSTEMS
[Recent Change In Weight] : ~T recent weight change [Fatigue] : fatigue [Abdominal Pain] : abdominal pain [Vomiting] : vomiting [Constipation] : constipation [Negative] : Allergic/Immunologic [FreeTextEntry7] : distention gas pain N/V

## 2019-12-11 NOTE — ASSESSMENT
[Palliative Care Plan] : not applicable at this time [Curative] : Goals of care discussed with patient: Curative [FreeTextEntry1] : no diagnosis at this time

## 2019-12-11 NOTE — HISTORY OF PRESENT ILLNESS
[Disease:__________________________] : Disease: [unfilled] [Therapy: ___] : Therapy: [unfilled] [Treatment Protocol] : Treatment Protocol [Cycle: ___] : Cycle: [unfilled] [Day: ___] : Day: [unfilled] [FreeTextEntry1] : RCHOP [de-identified] : Mr. Hopson is a 68 yo WM with a small bowel mass and associated mild hydronephrosis.  He presented with anemia in routine labs i(Hgb 11.8) n May, 2019\par He was admitted to [facility] in 1/2018 with abdominal pain and was found to have an SBO.  CT scan showed SBO with transition in RLQ, no significant adenopathy, small amount of ascites. Sxs and SBO resolved with NGT decompression and has not recurred.  Cologard had been (-)  3/2018.  There was lab evidence of iron deficiency.  Colonoscopy 8/1/19 was aborted due to poor prep.  Repeat on August 14, 2019 showed erythema in rectum and diverticular dz, with rectal bxs showing focal active colitis.   He developed abd pain after the recent colonoscopy and repeat CT scan on 8/28/19 showed a new large apple core mass in lower abd/upper pelvis, 8.6 x 6.8 x 6.2 cm with marked nodular circumferential wall thickening.  Mild left hydroureteronephrosis was seen.  There was no LAD or H/S megaly.  Ascites was not noted. \par He continues to have intermittent post-prandial dull discomfort to the left of umbilicus and extending to LLQ.  He has had no melena, hematochezia or change in bowel habits.  He has had no constitutional symptoms other than unintended 8 pound weight loss in the last few weeks.\par He has a h/o ASHD and had a stent placed in the LAD  in 2013.   Prostate cancer was treated in 2008 with Cyberknife at [place] in 2008.\par He has had regular urologic f/u's and he reports that his PSA is undetectable.\par He has DANIEL and uses a dental device; he previously responded for a few years after uvulectomy. 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 \par S/p admission for febrile neutropenia with Klebsiella bacteremia. had Mediport placed , second RCHOP taken on 10/31 with Flu vaccine and second Ferrahenme  , noted constipation small BM aftertreatment   . On Saturday took  MiraLAX  felt upset stomach sharp abd pain, abd distended  gas trapped . Sunday evening took one Senokot on tablet again felt sick to stomach increase gas pain . Tuesday had Bowel movements Today had more normal BM but with again sharp abd pain shooting  and N/V inti ally some solid  material other 2 times more liquid . unable to tolerate po did not take oral medications.

## 2019-12-11 NOTE — PHYSICAL EXAM
[Restricted in physically strenuous activity but ambulatory and able to carry out work of a light or sedentary nature] : Status 1- Restricted in physically strenuous activity but ambulatory and able to carry out work of a light or sedentary nature, e.g., light house work, office work [Normal] : affect appropriate [de-identified] : soft hypoactive BS upper abd and hyperactive lower abd . with N/V

## 2019-12-12 ENCOUNTER — APPOINTMENT (OUTPATIENT)
Dept: INFUSION THERAPY | Facility: HOSPITAL | Age: 69
End: 2019-12-12

## 2019-12-16 ENCOUNTER — OUTPATIENT (OUTPATIENT)
Dept: OUTPATIENT SERVICES | Facility: HOSPITAL | Age: 69
LOS: 1 days | Discharge: HOME | End: 2019-12-16

## 2019-12-16 ENCOUNTER — RESULT REVIEW (OUTPATIENT)
Age: 69
End: 2019-12-16

## 2019-12-16 ENCOUNTER — APPOINTMENT (OUTPATIENT)
Dept: HEMATOLOGY ONCOLOGY | Facility: CLINIC | Age: 69
End: 2019-12-16
Payer: COMMERCIAL

## 2019-12-16 DIAGNOSIS — C85.88 OTHER SPECIFIED TYPES OF NON-HODGKIN LYMPHOMA, LYMPH NODES OF MULTIPLE SITES: ICD-10-CM

## 2019-12-16 LAB
ALBUMIN SERPL ELPH-MCNC: 5.3 G/DL
ALP BLD-CCNC: 152 U/L
ALT SERPL-CCNC: 25 U/L
ANION GAP SERPL CALC-SCNC: 18 MMOL/L
AST SERPL-CCNC: 18 U/L
BASOPHILS # BLD AUTO: 0 K/UL — SIGNIFICANT CHANGE UP (ref 0–0.2)
BASOPHILS NFR BLD AUTO: 1.1 % — SIGNIFICANT CHANGE UP (ref 0–2)
BILIRUB SERPL-MCNC: 0.2 MG/DL
BUN SERPL-MCNC: 17 MG/DL — SIGNIFICANT CHANGE UP (ref 7–23)
BUN SERPL-MCNC: 19 MG/DL
CA-I BLDA-SCNC: 1.26 MMOL/L — SIGNIFICANT CHANGE UP (ref 1.12–1.3)
CALCIUM SERPL-MCNC: 10.9 MG/DL
CHLORIDE SERPL-SCNC: 105 MMOL/L — SIGNIFICANT CHANGE UP (ref 96–108)
CHLORIDE SERPL-SCNC: 97 MMOL/L
CO2 SERPL-SCNC: 23 MMOL/L
CO2 SERPL-SCNC: 25 MMOL/L — SIGNIFICANT CHANGE UP (ref 22–31)
CREAT SERPL-MCNC: 1 MG/DL — SIGNIFICANT CHANGE UP (ref 0.5–1.3)
CREAT SERPL-MCNC: 1.36 MG/DL
EOSINOPHIL # BLD AUTO: 0.1 K/UL — SIGNIFICANT CHANGE UP (ref 0–0.5)
EOSINOPHIL NFR BLD AUTO: 2.5 % — SIGNIFICANT CHANGE UP (ref 0–6)
GLUCOSE SERPL-MCNC: 103 MG/DL — HIGH (ref 70–99)
GLUCOSE SERPL-MCNC: 165 MG/DL
HCT VFR BLD CALC: 35.3 % — LOW (ref 39–50)
HGB BLD-MCNC: 11.5 G/DL — LOW (ref 13–17)
LYMPHOCYTES # BLD AUTO: 0.3 K/UL — LOW (ref 1–3.3)
LYMPHOCYTES # BLD AUTO: 6.6 % — LOW (ref 13–44)
MCHC RBC-ENTMCNC: 27.6 PG — SIGNIFICANT CHANGE UP (ref 27–34)
MCHC RBC-ENTMCNC: 32.7 G/DL — SIGNIFICANT CHANGE UP (ref 32–36)
MCV RBC AUTO: 84.6 FL — SIGNIFICANT CHANGE UP (ref 80–100)
MONOCYTES # BLD AUTO: 0.6 K/UL — SIGNIFICANT CHANGE UP (ref 0–0.9)
MONOCYTES NFR BLD AUTO: 13.1 % — SIGNIFICANT CHANGE UP (ref 2–14)
NEUTROPHILS # BLD AUTO: 3.3 K/UL — SIGNIFICANT CHANGE UP (ref 1.8–7.4)
NEUTROPHILS NFR BLD AUTO: 76.6 % — SIGNIFICANT CHANGE UP (ref 43–77)
PLATELET # BLD AUTO: 277 K/UL — SIGNIFICANT CHANGE UP (ref 150–400)
POTASSIUM SERPL-MCNC: 4.3 MMOL/L — SIGNIFICANT CHANGE UP (ref 3.5–5.3)
POTASSIUM SERPL-SCNC: 4.3 MMOL/L — SIGNIFICANT CHANGE UP (ref 3.5–5.3)
POTASSIUM SERPL-SCNC: 5.7 MMOL/L
PROT SERPL-MCNC: 7.5 G/DL
RBC # BLD: 4.17 M/UL — LOW (ref 4.2–5.8)
RBC # FLD: 20 % — HIGH (ref 10.3–14.5)
SODIUM SERPL-SCNC: 138 MMOL/L
SODIUM SERPL-SCNC: 139 MMOL/L — SIGNIFICANT CHANGE UP (ref 135–145)
WBC # BLD: 4.3 K/UL — SIGNIFICANT CHANGE UP (ref 3.8–10.5)
WBC # FLD AUTO: 4.3 K/UL — SIGNIFICANT CHANGE UP (ref 3.8–10.5)

## 2019-12-16 PROCEDURE — 99214 OFFICE O/P EST MOD 30 MIN: CPT

## 2019-12-16 RX ORDER — LEVOFLOXACIN 750 MG/1
750 TABLET, FILM COATED ORAL
Qty: 1 | Refills: 0 | Status: DISCONTINUED | COMMUNITY
Start: 2019-10-21 | End: 2019-12-16

## 2019-12-16 RX ORDER — LEVOFLOXACIN 500 MG/1
500 TABLET, FILM COATED ORAL
Qty: 25 | Refills: 2 | Status: DISCONTINUED | COMMUNITY
Start: 2019-10-17 | End: 2019-12-16

## 2019-12-16 RX ORDER — LEVOFLOXACIN 750 MG/1
750 TABLET, FILM COATED ORAL DAILY
Qty: 1 | Refills: 0 | Status: DISCONTINUED | COMMUNITY
Start: 2019-10-20 | End: 2019-12-16

## 2019-12-17 NOTE — HISTORY OF PRESENT ILLNESS
[FreeTextEntry1] : 69-year-old white gentleman who presents for his first postoperative visit.\par \par The patient is approximately 2 weeks status post laparotomy, small bowel resection and appendectomy for a recurrent small bowel obstruction with a background history of lymphoma with partial treatment. The patient had a rapid and uneventful postoperative recuperation and was discharged home on approximately postoperative day #4.\par \par He looks and feels quite well, having no residual abdominal pain. He is tolerating diet without difficulty and his bowel function is improving on a daily basis. He denies any rectal bleeding or temperature elevation.\par \par Pathology revealed no residual lymphoma in the small bowel specimen and the appendix.\par \par Physical examination reveals a soft, nontender, nondistended abdomen. There is no evidence of wound infection. Surgical staples were removed.\par \par Patient and his wife were informed of the pathology results. I want him to remain on a low-residue diet and avoid strenuous activity. I will see him in one month. He will also follow-up with oncology regarding further treatment plans.\par \par

## 2020-01-02 ENCOUNTER — APPOINTMENT (OUTPATIENT)
Dept: INFUSION THERAPY | Facility: HOSPITAL | Age: 70
End: 2020-01-02

## 2020-01-05 LAB
ALBUMIN SERPL ELPH-MCNC: 4.7 G/DL
ALP BLD-CCNC: 137 U/L
ALT SERPL-CCNC: 47 U/L
ANION GAP SERPL CALC-SCNC: 14 MMOL/L
AST SERPL-CCNC: 29 U/L
B2 MICROGLOB SERPL-MCNC: 2.5 MG/L
BILIRUB SERPL-MCNC: 0.4 MG/DL
BUN SERPL-MCNC: 16 MG/DL
CALCIUM SERPL-MCNC: 10.1 MG/DL
CHLORIDE SERPL-SCNC: 103 MMOL/L
CO2 SERPL-SCNC: 23 MMOL/L
CREAT SERPL-MCNC: 0.96 MG/DL
GLUCOSE SERPL-MCNC: 103 MG/DL
LDH SERPL-CCNC: 217 U/L
POTASSIUM SERPL-SCNC: 4.4 MMOL/L
PROT SERPL-MCNC: 6.7 G/DL
SODIUM SERPL-SCNC: 140 MMOL/L

## 2020-01-05 NOTE — PHYSICAL EXAM
[Restricted in physically strenuous activity but ambulatory and able to carry out work of a light or sedentary nature] : Status 1- Restricted in physically strenuous activity but ambulatory and able to carry out work of a light or sedentary nature, e.g., light house work, office work [Normal] : grossly intact [de-identified] : paupule rash with some lesions

## 2020-01-05 NOTE — REVIEW OF SYSTEMS
[Skin Rash] : skin rash [Negative] : Allergic/Immunologic [de-identified] : burning then itchness starting yesterday dermatone C4

## 2020-01-05 NOTE — ASSESSMENT
[Curative] : Goals of care discussed with patient: Curative [FreeTextEntry1] : DLBCL, GCB type non-DH. \par S/p R-CHOP, cycle 2, \par S/p admission with neutropenia and Klebsiella bacteremia. Iron defiency anemia s/p x 2 doses Feraheme\par SBO with resection and Appendectomy on 11/26 , now with Herpes Zoster rash\par plan: start Famvir 250 mg 3 x a day\par Silvadene cream to rash 2 x aday\par to f/u with Surgery once cleared to restart therapy  \par \par

## 2020-01-05 NOTE — HISTORY OF PRESENT ILLNESS
[Disease:__________________________] : Disease: [unfilled] [Treatment Protocol] : Treatment Protocol [Therapy: ___] : Therapy: [unfilled] [Cycle: ___] : Cycle: [unfilled] [de-identified] : Mr. Hopson is a 70 yo WM with a small bowel mass and associated mild hydronephrosis.  He presented with anemia in routine labs i(Hgb 11.8) n May, 2019\par He was admitted to [facility] in 1/2018 with abdominal pain and was found to have an SBO.  CT scan showed SBO with transition in RLQ, no significant adenopathy, small amount of ascites. Sxs and SBO resolved with NGT decompression and has not recurred.  Cologard had been (-)  3/2018.  There was lab evidence of iron deficiency.  Colonoscopy 8/1/19 was aborted due to poor prep.  Repeat on August 14, 2019 showed erythema in rectum and diverticular dz, with rectal bxs showing focal active colitis.   He developed abd pain after the recent colonoscopy and repeat CT scan on 8/28/19 showed a new large apple core mass in lower abd/upper pelvis, 8.6 x 6.8 x 6.2 cm with marked nodular circumferential wall thickening.  Mild left hydroureteronephrosis was seen.  There was no LAD or H/S megaly.  Ascites was not noted. \par He continues to have intermittent post-prandial dull discomfort to the left of umbilicus and extending to LLQ.  He has had no melena, hematochezia or change in bowel habits.  He has had no constitutional symptoms other than unintended 8 pound weight loss in the last few weeks.\par He has a h/o ASHD and had a stent placed in the LAD  in 2013.   Prostate cancer was treated in 2008 with Cyberknife at [place] in 2008.\par He has had regular urologic f/u's and he reports that his PSA is undetectable.\par He has DANIEL and uses a dental device; he previously responded for a few years after uvulectomy. [FreeTextEntry1] : RCHOP [de-identified] : \par presents to the ER with abdominal pain, nausea, and vomiting for 1 day. Pt was in the ER 1 week ago for a partial SBO secondary to a known jejunal mass. While in the ER he passed flatus and tolerated PO so he was discharged. He reports tolerating a diet without any issues for the past 5 days. He's coming back in today with 3 episodes of NBNB emesis overnight. He also reports LLQ 9/10 stabbing, intermittent abdominal pain that started last night. Last episode of flatus and BM were yesterday afternoon (no BRBPR or melena). No fever, chills, lightheadedness, dizziness, chest pain, SOB. \par Last colonoscopy was in August 2019 for anemia. Colonoscopy was wnl, but patient had abdominal pain afterwards and CT scan showed the jejunal mass. Biopsied in September 2019 and shown to be B cell lymphoma. \par CT abdomen: IMPRESSION: Small bowel obstruction with transition point at the level of the jejunal mass as seen on prior CT with interval increase in dilation of the involved bowel loops. \par The patient was admitted to the Trauma surgery service, made NPO/IVF, an NG tube was placed. Heme/onc was consulted and recommended hold 3rd cycle of chemo (was due 11/21) for now in preparation for OR. Medicine was consulted for OR optimization.\par TPN consulted for Pt w/ Protein-Calorie Malnutrition and patient started on TPN. \par 11/26- Patient underwent Ex-lap, PRINCE, SBR, and appendectomy. The patient tolerated the procedure well without complications, was extubated, and transferred to the PACU in stable condition. In the PACU, the patients' pain was controlled, vitals stable, tolerating PO, and voiding appropriately. The patient was transferred to the surgical floor in stable condition. \par The patient was managed post operatively for pain control as well as return of GI function. Once patient regained GI function his diet was advanced which he tolerated and TPN weaned.\par On day of discharge, the patient was tolerating diet, ambulating well and pain controlled. The patient will be discharged home with outpatient follow up with Dr. Cunha as well as his oncology.\par seen today urgently for new rash extensive to shoulder and chest and back consistent with zoster rash will start Famvir and Silvadene \par hypertension - maintain on oral hypertensive agent\par hyperlipidemia- on Lipitor\par

## 2020-01-08 ENCOUNTER — APPOINTMENT (OUTPATIENT)
Dept: COLORECTAL SURGERY | Facility: CLINIC | Age: 70
End: 2020-01-08
Payer: COMMERCIAL

## 2020-01-08 PROCEDURE — 99024 POSTOP FOLLOW-UP VISIT: CPT

## 2020-01-09 ENCOUNTER — APPOINTMENT (OUTPATIENT)
Dept: HEMATOLOGY ONCOLOGY | Facility: CLINIC | Age: 70
End: 2020-01-09
Payer: COMMERCIAL

## 2020-01-09 ENCOUNTER — RESULT REVIEW (OUTPATIENT)
Age: 70
End: 2020-01-09

## 2020-01-09 VITALS
BODY MASS INDEX: 24.79 KG/M2 | OXYGEN SATURATION: 100 % | HEART RATE: 66 BPM | DIASTOLIC BLOOD PRESSURE: 93 MMHG | TEMPERATURE: 97.5 F | RESPIRATION RATE: 15 BRPM | WEIGHT: 145.72 LBS | SYSTOLIC BLOOD PRESSURE: 183 MMHG

## 2020-01-09 DIAGNOSIS — Z86.39 PERSONAL HISTORY OF OTHER ENDOCRINE, NUTRITIONAL AND METABOLIC DISEASE: ICD-10-CM

## 2020-01-09 DIAGNOSIS — Z90.49 ACQUIRED ABSENCE OF OTHER SPECIFIED PARTS OF DIGESTIVE TRACT: ICD-10-CM

## 2020-01-09 DIAGNOSIS — Z86.79 PERSONAL HISTORY OF OTHER DISEASES OF THE CIRCULATORY SYSTEM: ICD-10-CM

## 2020-01-09 LAB
BASOPHILS # BLD AUTO: 0 K/UL — SIGNIFICANT CHANGE UP (ref 0–0.2)
BASOPHILS NFR BLD AUTO: 0.2 % — SIGNIFICANT CHANGE UP (ref 0–2)
EOSINOPHIL # BLD AUTO: 0.2 K/UL — SIGNIFICANT CHANGE UP (ref 0–0.5)
EOSINOPHIL NFR BLD AUTO: 3.6 % — SIGNIFICANT CHANGE UP (ref 0–6)
HCT VFR BLD CALC: 38.3 % — LOW (ref 39–50)
HGB BLD-MCNC: 12.5 G/DL — LOW (ref 13–17)
LYMPHOCYTES # BLD AUTO: 0.9 K/UL — LOW (ref 1–3.3)
LYMPHOCYTES # BLD AUTO: 17.5 % — SIGNIFICANT CHANGE UP (ref 13–44)
MCHC RBC-ENTMCNC: 28 PG — SIGNIFICANT CHANGE UP (ref 27–34)
MCHC RBC-ENTMCNC: 32.7 G/DL — SIGNIFICANT CHANGE UP (ref 32–36)
MCV RBC AUTO: 85.8 FL — SIGNIFICANT CHANGE UP (ref 80–100)
MONOCYTES # BLD AUTO: 0.6 K/UL — SIGNIFICANT CHANGE UP (ref 0–0.9)
MONOCYTES NFR BLD AUTO: 11.6 % — SIGNIFICANT CHANGE UP (ref 2–14)
NEUTROPHILS # BLD AUTO: 3.3 K/UL — SIGNIFICANT CHANGE UP (ref 1.8–7.4)
NEUTROPHILS NFR BLD AUTO: 67 % — SIGNIFICANT CHANGE UP (ref 43–77)
PLATELET # BLD AUTO: 225 K/UL — SIGNIFICANT CHANGE UP (ref 150–400)
RBC # BLD: 4.47 M/UL — SIGNIFICANT CHANGE UP (ref 4.2–5.8)
RBC # FLD: 17.4 % — HIGH (ref 10.3–14.5)
WBC # BLD: 4.9 K/UL — SIGNIFICANT CHANGE UP (ref 3.8–10.5)
WBC # FLD AUTO: 4.9 K/UL — SIGNIFICANT CHANGE UP (ref 3.8–10.5)

## 2020-01-09 PROCEDURE — 99214 OFFICE O/P EST MOD 30 MIN: CPT

## 2020-01-09 RX ORDER — FAMCICLOVIR 250 MG/1
250 TABLET, FILM COATED ORAL
Qty: 21 | Refills: 0 | Status: DISCONTINUED | COMMUNITY
Start: 2019-12-16 | End: 2020-01-09

## 2020-01-09 NOTE — ASSESSMENT
[Curative] : Goals of care discussed with patient: Curative [FreeTextEntry1] : DLBCL, GCB type non-DH. \par S/p R-CHOP, cycle 2.\par Has recovered from SBO with SB  resection. No residual dz seen by path.\par Plan to get PET/CT and next cycle of R- CHOP within next week or so . If PET is neg will give four more cycles of therapy other wise would consider giving six more given delay of tx for at total 8 cycle . \par Silvadene cream d'c.\par Increase Neurotin 300 mg 2x/D and 600mg qhs.\par \par RV  day 7 to 10 of cycle 3.

## 2020-01-09 NOTE — PHYSICAL EXAM
[Restricted in physically strenuous activity but ambulatory and able to carry out work of a light or sedentary nature] : Status 1- Restricted in physically strenuous activity but ambulatory and able to carry out work of a light or sedentary nature, e.g., light house work, office work [Normal] : grossly intact [de-identified] : no CVAT. [de-identified] : paupule rash with some lesions

## 2020-01-09 NOTE — ADDENDUM
[FreeTextEntry1] : Documented by Amarjit Holly acting as a scribe for Dr. Adi Kingston on 01/09/2020\par \par All medical record entries made by a scribe were at my, Dr. Adi Kingston direction and personally dictated by me on 01/09/2020 . I have reviewed the chart and agree that the record accurately reflects my personal performance of the history, physical exam, procedure and imaging.\par

## 2020-01-09 NOTE — HISTORY OF PRESENT ILLNESS
[FreeTextEntry1] : \par \par 69-year-old white gentleman who presents for his second postop visit.\par \par Patient is 6 weeks status post exploratory laparoscopy and small bowel resection with appendectomy for recurrent small bowel obstruction with a background history of lymphoma. Patient looks and feels excellently. He is tolerating diet and moving his bowels without difficulty. He occasionally has some cramping which is resolved by ambulation. Regrettably, patient has developed shingles and is being treated.\par \par The patient's physical examination reveals a soft, nontender, nondistended abdomen. His specimen extraction site in the midline is fully healed. He has no further dietary or physical activity restrictions. He is to follow-up with oncology and schedule a PET scan to determine further treatment plan for his underlying lymphoma.\par \par I will see him as needed.  As an aside, he is up-to-date with his colonoscopies.

## 2020-01-09 NOTE — HISTORY OF PRESENT ILLNESS
[Disease:__________________________] : Disease: [unfilled] [Treatment Protocol] : Treatment Protocol [Therapy: ___] : Therapy: [unfilled] [Cycle: ___] : Cycle: [unfilled] [de-identified] : Mr. Hopson is a 70 yo WM with a small bowel mass and associated mild hydronephrosis.  He presented with anemia in routine labs i(Hgb 11.8) n May, 2019\par He was admitted to [facility] in 1/2018 with abdominal pain and was found to have an SBO.  CT scan showed SBO with transition in RLQ, no significant adenopathy, small amount of ascites. Sxs and SBO resolved with NGT decompression and has not recurred.  Cologard had been (-)  3/2018.  There was lab evidence of iron deficiency.  Colonoscopy 8/1/19 was aborted due to poor prep.  Repeat on August 14, 2019 showed erythema in rectum and diverticular dz, with rectal bxs showing focal active colitis.   He developed abd pain after the recent colonoscopy and repeat CT scan on 8/28/19 showed a new large apple core mass in lower abd/upper pelvis, 8.6 x 6.8 x 6.2 cm with marked nodular circumferential wall thickening.  Mild left hydroureteronephrosis was seen.  There was no LAD or H/S megaly.  Ascites was not noted. \par He continues to have intermittent post-prandial dull discomfort to the left of umbilicus and extending to LLQ.  He has had no melena, hematochezia or change in bowel habits.  He has had no constitutional symptoms other than unintended 8 pound weight loss in the last few weeks.\par He has a h/o ASHD and had a stent placed in the LAD  in 2013.   Prostate cancer was treated in 2008 with Cyberknife at [place] in 2008.\par He has had regular urologic f/u's and he reports that his PSA is undetectable.\par He has DANIEL and uses a dental device; he previously responded for a few years after uvulectomy.\par \par  [FreeTextEntry1] : RCHOP [de-identified] : DLBCL s/p 2 cycles of R-CHOP. with response followed by SBO small bole resection wothout evidence of residual dz. Has now recovered form suregery. \par CT abdomen (pre surgery): IMPRESSION: Small bowel obstruction with transition point at the level of the jejunal mass as seen on prior CT with interval increase in dilation of the involved bowel loops. \par During ayesha-op TPN consulted for Pt w/ Protein-Calorie Malnutrition and patient started on TPN. \par Hypertension - maintain on oral hypertensive agent. BP elevated today 183/93.\par Hyperlipidemia- on Lipitor.\par Today CBC shows WBC 4.9, Hgb 12.5, Plt 225K.

## 2020-01-13 ENCOUNTER — APPOINTMENT (OUTPATIENT)
Dept: NUCLEAR MEDICINE | Facility: CLINIC | Age: 70
End: 2020-01-13
Payer: COMMERCIAL

## 2020-01-13 ENCOUNTER — OUTPATIENT (OUTPATIENT)
Dept: OUTPATIENT SERVICES | Facility: HOSPITAL | Age: 70
LOS: 1 days | End: 2020-01-13

## 2020-01-13 DIAGNOSIS — C83.30 DIFFUSE LARGE B-CELL LYMPHOMA, UNSPECIFIED SITE: ICD-10-CM

## 2020-01-13 PROCEDURE — 78815 PET IMAGE W/CT SKULL-THIGH: CPT | Mod: 26,PS

## 2020-01-14 ENCOUNTER — OUTPATIENT (OUTPATIENT)
Dept: OUTPATIENT SERVICES | Facility: HOSPITAL | Age: 70
LOS: 1 days | Discharge: HOME | End: 2020-01-14

## 2020-01-14 DIAGNOSIS — C85.88 OTHER SPECIFIED TYPES OF NON-HODGKIN LYMPHOMA, LYMPH NODES OF MULTIPLE SITES: ICD-10-CM

## 2020-01-15 NOTE — PROCEDURE
[Bone Marrow Biopsy] : bone marrow biopsy [Bone Marrow Aspiration] : bone marrow aspiration  [Patient] : the patient [Patient identification verified] : patient identification verified [Procedure verified and consent obtained] : procedure verified and consent obtained [Laterality verified and correct site marked] : laterality verified and correct site marked [Right] : site: right [Correct positioning] : correct positioning [Prone] : prone [Superior iliac spine was identified] : the superior iliac spine was identified. [The right posterior iliac crest was prepped with betadine and draped, using sterile technique.] : The right posterior iliac crest was prepped with betadine and draped, using sterile technique. [Lidocaine was injected and into the periosteum overlying the site.] : Lidocaine was injected and into the periosteum overlying the site. [Aspirate] : aspirate [Cytogenetics] : cytogenetics [Other ___] : [unfilled] [Biopsy] : biopsy [Flow Cytometry] : flow cytometry [] : The patient was instructed to remove the bandage the following AM. The patient may bathe. Acetaminophen may be taken for discomfort, as per package directions.If there are any other problems, the patient was instructed to call the office. The patient verbalized understanding, and is aware of the office contact numbers. [FreeTextEntry1] : DLBCL [FreeTextEntry2] : Medications and allergies reviewed. CBC reviewed.  10cc of 1 percent lidocaine injected to achieve analgesia.  Aspirate and biopsy obtained.  Hemostasis achieved.  DSD placed. Patient tolerated procedure well.  Labels reviewed by myself and Ana Henry RN. Patient advised to call in 1 week for test results. Patient advised that we need discs from prior CT scan. \par

## 2020-01-16 ENCOUNTER — APPOINTMENT (OUTPATIENT)
Dept: INFUSION THERAPY | Facility: HOSPITAL | Age: 70
End: 2020-01-16

## 2020-01-16 ENCOUNTER — LABORATORY RESULT (OUTPATIENT)
Age: 70
End: 2020-01-16

## 2020-01-16 ENCOUNTER — RESULT REVIEW (OUTPATIENT)
Age: 70
End: 2020-01-16

## 2020-01-16 LAB
BASOPHILS # BLD AUTO: 0 K/UL — SIGNIFICANT CHANGE UP (ref 0–0.2)
BASOPHILS NFR BLD AUTO: 0.4 % — SIGNIFICANT CHANGE UP (ref 0–2)
EOSINOPHIL # BLD AUTO: 0.2 K/UL — SIGNIFICANT CHANGE UP (ref 0–0.5)
EOSINOPHIL NFR BLD AUTO: 3.9 % — SIGNIFICANT CHANGE UP (ref 0–6)
HCT VFR BLD CALC: 39.4 % — SIGNIFICANT CHANGE UP (ref 39–50)
HGB BLD-MCNC: 13.4 G/DL — SIGNIFICANT CHANGE UP (ref 13–17)
LYMPHOCYTES # BLD AUTO: 1 K/UL — SIGNIFICANT CHANGE UP (ref 1–3.3)
LYMPHOCYTES # BLD AUTO: 17 % — SIGNIFICANT CHANGE UP (ref 13–44)
MCHC RBC-ENTMCNC: 29 PG — SIGNIFICANT CHANGE UP (ref 27–34)
MCHC RBC-ENTMCNC: 34.1 G/DL — SIGNIFICANT CHANGE UP (ref 32–36)
MCV RBC AUTO: 85 FL — SIGNIFICANT CHANGE UP (ref 80–100)
MONOCYTES # BLD AUTO: 0.7 K/UL — SIGNIFICANT CHANGE UP (ref 0–0.9)
MONOCYTES NFR BLD AUTO: 12.5 % — SIGNIFICANT CHANGE UP (ref 2–14)
NEUTROPHILS # BLD AUTO: 3.8 K/UL — SIGNIFICANT CHANGE UP (ref 1.8–7.4)
NEUTROPHILS NFR BLD AUTO: 66.2 % — SIGNIFICANT CHANGE UP (ref 43–77)
PLATELET # BLD AUTO: 245 K/UL — SIGNIFICANT CHANGE UP (ref 150–400)
RBC # BLD: 4.63 M/UL — SIGNIFICANT CHANGE UP (ref 4.2–5.8)
RBC # FLD: 17 % — HIGH (ref 10.3–14.5)
WBC # BLD: 5.7 K/UL — SIGNIFICANT CHANGE UP (ref 3.8–10.5)
WBC # FLD AUTO: 5.7 K/UL — SIGNIFICANT CHANGE UP (ref 3.8–10.5)

## 2020-01-17 DIAGNOSIS — R11.2 NAUSEA WITH VOMITING, UNSPECIFIED: ICD-10-CM

## 2020-01-17 DIAGNOSIS — E86.0 DEHYDRATION: ICD-10-CM

## 2020-01-17 DIAGNOSIS — Z51.11 ENCOUNTER FOR ANTINEOPLASTIC CHEMOTHERAPY: ICD-10-CM

## 2020-01-17 LAB
ALBUMIN SERPL ELPH-MCNC: 4.6 G/DL
ALP BLD-CCNC: 106 U/L
ALT SERPL-CCNC: 23 U/L
ANION GAP SERPL CALC-SCNC: 15 MMOL/L
AST SERPL-CCNC: 21 U/L
B2 MICROGLOB SERPL-MCNC: 2.1 MG/L
BILIRUB SERPL-MCNC: 0.2 MG/DL
BUN SERPL-MCNC: 25 MG/DL
CALCIUM SERPL-MCNC: 9.5 MG/DL
CHLORIDE SERPL-SCNC: 103 MMOL/L
CO2 SERPL-SCNC: 24 MMOL/L
CREAT SERPL-MCNC: 0.99 MG/DL
GLUCOSE SERPL-MCNC: 123 MG/DL
LDH SERPL-CCNC: 185 U/L
POTASSIUM SERPL-SCNC: 4.9 MMOL/L
PROT SERPL-MCNC: 6.5 G/DL
SODIUM SERPL-SCNC: 141 MMOL/L

## 2020-01-23 ENCOUNTER — RESULT REVIEW (OUTPATIENT)
Age: 70
End: 2020-01-23

## 2020-01-23 ENCOUNTER — APPOINTMENT (OUTPATIENT)
Dept: INFUSION THERAPY | Facility: HOSPITAL | Age: 70
End: 2020-01-23

## 2020-01-23 ENCOUNTER — APPOINTMENT (OUTPATIENT)
Dept: HEMATOLOGY ONCOLOGY | Facility: CLINIC | Age: 70
End: 2020-01-23
Payer: COMMERCIAL

## 2020-01-23 VITALS
HEART RATE: 75 BPM | TEMPERATURE: 98.6 F | WEIGHT: 148.81 LBS | OXYGEN SATURATION: 100 % | SYSTOLIC BLOOD PRESSURE: 139 MMHG | DIASTOLIC BLOOD PRESSURE: 84 MMHG | RESPIRATION RATE: 15 BRPM | BODY MASS INDEX: 25.31 KG/M2

## 2020-01-23 LAB
HCT VFR BLD CALC: 36.4 % — LOW (ref 39–50)
HGB BLD-MCNC: 12.5 G/DL — LOW (ref 13–17)
MCHC RBC-ENTMCNC: 29.6 PG — SIGNIFICANT CHANGE UP (ref 27–34)
MCHC RBC-ENTMCNC: 34.4 G/DL — SIGNIFICANT CHANGE UP (ref 32–36)
MCV RBC AUTO: 86.1 FL — SIGNIFICANT CHANGE UP (ref 80–100)
PLATELET # BLD AUTO: 102 K/UL — LOW (ref 150–400)
RBC # BLD: 4.23 M/UL — SIGNIFICANT CHANGE UP (ref 4.2–5.8)
RBC # FLD: 15.4 % — HIGH (ref 10.3–14.5)
WBC # BLD: 0.5 K/UL — CRITICAL LOW (ref 3.8–10.5)
WBC # FLD AUTO: 0.5 K/UL — CRITICAL LOW (ref 3.8–10.5)

## 2020-01-23 PROCEDURE — 99214 OFFICE O/P EST MOD 30 MIN: CPT

## 2020-01-23 RX ORDER — ALLOPURINOL 100 MG/1
100 TABLET ORAL DAILY
Qty: 30 | Refills: 1 | Status: COMPLETED | COMMUNITY
End: 2020-01-23

## 2020-01-30 ENCOUNTER — APPOINTMENT (OUTPATIENT)
Dept: HEMATOLOGY ONCOLOGY | Facility: CLINIC | Age: 70
End: 2020-01-30

## 2020-02-04 ENCOUNTER — RESULT REVIEW (OUTPATIENT)
Age: 70
End: 2020-02-04

## 2020-02-04 ENCOUNTER — APPOINTMENT (OUTPATIENT)
Dept: INFUSION THERAPY | Facility: HOSPITAL | Age: 70
End: 2020-02-04

## 2020-02-04 ENCOUNTER — LABORATORY RESULT (OUTPATIENT)
Age: 70
End: 2020-02-04

## 2020-02-04 LAB
EOSINOPHIL # BLD AUTO: 0.1 K/UL — SIGNIFICANT CHANGE UP (ref 0–0.5)
HCT VFR BLD CALC: 38.5 % — LOW (ref 39–50)
HGB BLD-MCNC: 12.7 G/DL — LOW (ref 13–17)
LYMPHOCYTES # BLD AUTO: 0.6 K/UL — LOW (ref 1–3.3)
LYMPHOCYTES # BLD AUTO: 9 % — LOW (ref 13–44)
MCHC RBC-ENTMCNC: 28.5 PG — SIGNIFICANT CHANGE UP (ref 27–34)
MCHC RBC-ENTMCNC: 33 G/DL — SIGNIFICANT CHANGE UP (ref 32–36)
MCV RBC AUTO: 86.4 FL — SIGNIFICANT CHANGE UP (ref 80–100)
MONOCYTES # BLD AUTO: 0.6 K/UL — SIGNIFICANT CHANGE UP (ref 0–0.9)
MONOCYTES NFR BLD AUTO: 6 % — SIGNIFICANT CHANGE UP (ref 2–14)
NEUTROPHILS # BLD AUTO: 5.2 K/UL — SIGNIFICANT CHANGE UP (ref 1.8–7.4)
NEUTROPHILS NFR BLD AUTO: 85 % — HIGH (ref 43–77)
PLAT MORPH BLD: NORMAL — SIGNIFICANT CHANGE UP
PLATELET # BLD AUTO: 344 K/UL — SIGNIFICANT CHANGE UP (ref 150–400)
RBC # BLD: 4.45 M/UL — SIGNIFICANT CHANGE UP (ref 4.2–5.8)
RBC # FLD: 15.6 % — HIGH (ref 10.3–14.5)
RBC BLD AUTO: SIGNIFICANT CHANGE UP
WBC # BLD: 6.6 K/UL — SIGNIFICANT CHANGE UP (ref 3.8–10.5)
WBC # FLD AUTO: 6.6 K/UL — SIGNIFICANT CHANGE UP (ref 3.8–10.5)

## 2020-02-05 DIAGNOSIS — Z51.89 ENCOUNTER FOR OTHER SPECIFIED AFTERCARE: ICD-10-CM

## 2020-02-10 ENCOUNTER — APPOINTMENT (OUTPATIENT)
Dept: HEMATOLOGY ONCOLOGY | Facility: CLINIC | Age: 70
End: 2020-02-10

## 2020-02-11 ENCOUNTER — RESULT REVIEW (OUTPATIENT)
Age: 70
End: 2020-02-11

## 2020-02-11 ENCOUNTER — APPOINTMENT (OUTPATIENT)
Dept: HEMATOLOGY ONCOLOGY | Facility: CLINIC | Age: 70
End: 2020-02-11
Payer: COMMERCIAL

## 2020-02-11 VITALS
DIASTOLIC BLOOD PRESSURE: 76 MMHG | RESPIRATION RATE: 16 BRPM | WEIGHT: 144.4 LBS | HEART RATE: 73 BPM | TEMPERATURE: 98.6 F | OXYGEN SATURATION: 99 % | BODY MASS INDEX: 24.56 KG/M2 | SYSTOLIC BLOOD PRESSURE: 128 MMHG

## 2020-02-11 LAB
BASOPHILS # BLD AUTO: 0 K/UL — SIGNIFICANT CHANGE UP (ref 0–0.2)
EOSINOPHIL NFR BLD AUTO: 10 % — HIGH (ref 0–6)
HCT VFR BLD CALC: 35.9 % — LOW (ref 39–50)
HGB BLD-MCNC: 11.9 G/DL — LOW (ref 13–17)
LYMPHOCYTES # BLD AUTO: 51 % — HIGH (ref 13–44)
MCHC RBC-ENTMCNC: 28.8 PG — SIGNIFICANT CHANGE UP (ref 27–34)
MCHC RBC-ENTMCNC: 33.2 G/DL — SIGNIFICANT CHANGE UP (ref 32–36)
MCV RBC AUTO: 86.8 FL — SIGNIFICANT CHANGE UP (ref 80–100)
MONOCYTES NFR BLD AUTO: 17 % — HIGH (ref 2–14)
NEUTROPHILS # BLD AUTO: SIGNIFICANT CHANGE UP (ref 1.8–7.4)
NEUTROPHILS NFR BLD AUTO: 22 % — LOW (ref 43–77)
PLAT MORPH BLD: NORMAL — SIGNIFICANT CHANGE UP
PLATELET # BLD AUTO: 96 K/UL — LOW (ref 150–400)
RBC # BLD: 4.14 M/UL — LOW (ref 4.2–5.8)
RBC # FLD: 13.8 % — SIGNIFICANT CHANGE UP (ref 10.3–14.5)
RBC BLD AUTO: SIGNIFICANT CHANGE UP
WBC # BLD: 0.6 K/UL — CRITICAL LOW (ref 3.8–10.5)
WBC # FLD AUTO: 0.6 K/UL — CRITICAL LOW (ref 3.8–10.5)

## 2020-02-11 PROCEDURE — 99214 OFFICE O/P EST MOD 30 MIN: CPT

## 2020-02-11 RX ORDER — SILVER SULFADIAZINE 10 MG/G
1 CREAM TOPICAL TWICE DAILY
Qty: 1 | Refills: 2 | Status: DISCONTINUED | COMMUNITY
Start: 2019-12-16 | End: 2020-02-11

## 2020-02-13 ENCOUNTER — OUTPATIENT (OUTPATIENT)
Dept: OUTPATIENT SERVICES | Facility: HOSPITAL | Age: 70
LOS: 1 days | Discharge: HOME | End: 2020-02-13

## 2020-02-13 DIAGNOSIS — C85.88 OTHER SPECIFIED TYPES OF NON-HODGKIN LYMPHOMA, LYMPH NODES OF MULTIPLE SITES: ICD-10-CM

## 2020-02-17 NOTE — ASSESSMENT
[FreeTextEntry1] : DLBCL, GCB type non-DH. \par S/p R-CHOP, cycle 4 \par has recovered from SBO with SB  resection. No residual dz seen by path.\par . 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 Cymbalta also added reports pain decreasing .\par \par RV  day 7 to 10 of cycle 4. [Curative] : Goals of care discussed with patient: Curative

## 2020-02-17 NOTE — HISTORY OF PRESENT ILLNESS
[Disease:__________________________] : Disease: [unfilled] [Treatment Protocol] : Treatment Protocol [Therapy: ___] : Therapy: [unfilled] [Cycle: ___] : Cycle: [unfilled] [Day: ___] : Day: [unfilled] [de-identified] : Mr. Hopson is a 68 yo WM with a small bowel mass and associated mild hydronephrosis.  He presented with anemia in routine labs i(Hgb 11.8) n May, 2019\par He was admitted to [facility] in 1/2018 with abdominal pain and was found to have an SBO.  CT scan showed SBO with transition in RLQ, no significant adenopathy, small amount of ascites. Sxs and SBO resolved with NGT decompression and has not recurred.  Cologard had been (-)  3/2018.  There was lab evidence of iron deficiency.  Colonoscopy 8/1/19 was aborted due to poor prep.  Repeat on August 14, 2019 showed erythema in rectum and diverticular dz, with rectal bxs showing focal active colitis.   He developed abd pain after the recent colonoscopy and repeat CT scan on 8/28/19 showed a new large apple core mass in lower abd/upper pelvis, 8.6 x 6.8 x 6.2 cm with marked nodular circumferential wall thickening.  Mild left hydroureteronephrosis was seen.  There was no LAD or H/S megaly.  Ascites was not noted. \par He continues to have intermittent post-prandial dull discomfort to the left of umbilicus and extending to LLQ.  He has had no melena, hematochezia or change in bowel habits.  He has had no constitutional symptoms other than unintended 8 pound weight loss in the last few weeks.\par He has a h/o ASHD and had a stent placed in the LAD  in 2013.   Prostate cancer was treated in 2008 with Cyberknife at [place] in 2008.\par He has had regular urologic f/u's and he reports that his PSA is undetectable.\par He has DANIEL and uses a dental device; he previously responded for a few years after uvulectomy. [FreeTextEntry1] : RCHOP [de-identified] : DLBCL s/p 2 cycles of R-CHOP. with response followed by SBO small  resection without evidence of residual dz. Has now recovered form surgery. \par CT abdomen (pre surgery): IMPRESSION: Small bowel obstruction with transition point at the level of the jejunal mass as seen on prior CT with interval increase in dilation of the involved bowel loops. \par During ayesha-op TPN consulted for Pt w/ Protein-Calorie Malnutrition and patient started on TPN. \par Hypertension - maintain on oral hypertensive agent. \par Hyperlipidemia- on Lipitor.\par PET /CT scan -  Resolution of FDG avid peritoneal and intraluminal small bowel foci. Several residual intraluminal small bowel foci with FDG avidity slightly above liver background (Deauville 4). Interval postsurgical changes of small bowel resection. to receive total 4 more cycles of Rchop\par post herpetic pain - add Cymbalta and PEA;pain \par WBC .5 today on Levaquin currently warned re fever\par \par \par

## 2020-02-17 NOTE — HISTORY OF PRESENT ILLNESS
[Disease:__________________________] : Disease: [unfilled] [de-identified] : Mr. Hopson is a 68 yo WM with a small bowel mass and associated mild hydronephrosis.  He presented with anemia in routine labs i(Hgb 11.8) n May, 2019\par He was admitted to [facility] in 1/2018 with abdominal pain and was found to have an SBO.  CT scan showed SBO with transition in RLQ, no significant adenopathy, small amount of ascites. Sxs and SBO resolved with NGT decompression and has not recurred.  Cologard had been (-)  3/2018.  There was lab evidence of iron deficiency.  Colonoscopy 8/1/19 was aborted due to poor prep.  Repeat on August 14, 2019 showed erythema in rectum and diverticular dz, with rectal bxs showing focal active colitis.   He developed abd pain after the recent colonoscopy and repeat CT scan on 8/28/19 showed a new large apple core mass in lower abd/upper pelvis, 8.6 x 6.8 x 6.2 cm with marked nodular circumferential wall thickening.  Mild left hydroureteronephrosis was seen.  There was no LAD or H/S megaly.  Ascites was not noted. \par He continues to have intermittent post-prandial dull discomfort to the left of umbilicus and extending to LLQ.  He has had no melena, hematochezia or change in bowel habits.  He has had no constitutional symptoms other than unintended 8 pound weight loss in the last few weeks.\par He has a h/o ASHD and had a stent placed in the LAD  in 2013.   Prostate cancer was treated in 2008 with Cyberknife at [place] in 2008.\par He has had regular urologic f/u's and he reports that his PSA is undetectable.\par He has DANIEL and uses a dental device; he previously responded for a few years after uvulectomy. [Treatment Protocol] : Treatment Protocol [Therapy: ___] : Therapy: [unfilled] [Cycle: ___] : Cycle: [unfilled] [Day: ___] : Day: [unfilled] [FreeTextEntry1] : RCHOP [de-identified] : DLBCL s/p 2 cycles of R-CHOP. with response followed by SBO small  resection without evidence of residual dz. Has now recovered form surgery. \par CT abdomen (pre surgery): IMPRESSION: Small bowel obstruction with transition point at the level of the jejunal mass as seen on prior CT with interval increase in dilation of the involved bowel loops. \par During ayesha-op TPN consulted for Pt w/ Protein-Calorie Malnutrition and patient started on TPN. \par Hypertension - maintain on oral hypertensive agent. \par Hyperlipidemia- on Lipitor.\par PET /CT scan -  Resolution of FDG avid peritoneal and intraluminal small bowel foci. Several residual intraluminal small bowel foci with FDG avidity slightly above liver background (Deauville 4). Interval postsurgical changes of small bowel resection. to receive total 4 more cycles of Rchop\par post herpetic pain - add Cymbalta and PEA;pain \par tolerating treatment well increase fatigue noted denies fever, chills n/v abd pain no diarrhea or constipation.denies bone pain after discussion with Dr Kingston has elected to completed RCHOP q 2weeks \par \par \par \par

## 2020-02-19 ENCOUNTER — RESULT REVIEW (OUTPATIENT)
Age: 70
End: 2020-02-19

## 2020-02-19 ENCOUNTER — LABORATORY RESULT (OUTPATIENT)
Age: 70
End: 2020-02-19

## 2020-02-19 ENCOUNTER — APPOINTMENT (OUTPATIENT)
Dept: INFUSION THERAPY | Facility: HOSPITAL | Age: 70
End: 2020-02-19

## 2020-02-19 LAB
BASOPHILS # BLD AUTO: 0 K/UL — SIGNIFICANT CHANGE UP (ref 0–0.2)
BASOPHILS NFR BLD AUTO: 0.4 % — SIGNIFICANT CHANGE UP (ref 0–2)
EOSINOPHIL # BLD AUTO: 0.1 K/UL — SIGNIFICANT CHANGE UP (ref 0–0.5)
EOSINOPHIL NFR BLD AUTO: 1.8 % — SIGNIFICANT CHANGE UP (ref 0–6)
HCT VFR BLD CALC: 36.6 % — LOW (ref 39–50)
HGB BLD-MCNC: 12.3 G/DL — LOW (ref 13–17)
LYMPHOCYTES # BLD AUTO: 0.4 K/UL — LOW (ref 1–3.3)
LYMPHOCYTES # BLD AUTO: 6.8 % — LOW (ref 13–44)
MCHC RBC-ENTMCNC: 29.1 PG — SIGNIFICANT CHANGE UP (ref 27–34)
MCHC RBC-ENTMCNC: 33.7 G/DL — SIGNIFICANT CHANGE UP (ref 32–36)
MCV RBC AUTO: 86.4 FL — SIGNIFICANT CHANGE UP (ref 80–100)
MONOCYTES # BLD AUTO: 0.3 K/UL — SIGNIFICANT CHANGE UP (ref 0–0.9)
MONOCYTES NFR BLD AUTO: 4.7 % — SIGNIFICANT CHANGE UP (ref 2–14)
NEUTROPHILS # BLD AUTO: 5.6 K/UL — SIGNIFICANT CHANGE UP (ref 1.8–7.4)
NEUTROPHILS NFR BLD AUTO: 86.3 % — HIGH (ref 43–77)
PLATELET # BLD AUTO: 204 K/UL — SIGNIFICANT CHANGE UP (ref 150–400)
RBC # BLD: 4.24 M/UL — SIGNIFICANT CHANGE UP (ref 4.2–5.8)
RBC # FLD: 14.5 % — SIGNIFICANT CHANGE UP (ref 10.3–14.5)
WBC # BLD: 6.4 K/UL — SIGNIFICANT CHANGE UP (ref 3.8–10.5)
WBC # FLD AUTO: 6.4 K/UL — SIGNIFICANT CHANGE UP (ref 3.8–10.5)

## 2020-02-20 DIAGNOSIS — Z51.11 ENCOUNTER FOR ANTINEOPLASTIC CHEMOTHERAPY: ICD-10-CM

## 2020-02-20 DIAGNOSIS — R11.2 NAUSEA WITH VOMITING, UNSPECIFIED: ICD-10-CM

## 2020-02-20 DIAGNOSIS — C83.30 DIFFUSE LARGE B-CELL LYMPHOMA, UNSPECIFIED SITE: ICD-10-CM

## 2020-02-20 DIAGNOSIS — Z51.89 ENCOUNTER FOR OTHER SPECIFIED AFTERCARE: ICD-10-CM

## 2020-02-27 ENCOUNTER — RESULT REVIEW (OUTPATIENT)
Age: 70
End: 2020-02-27

## 2020-02-27 ENCOUNTER — APPOINTMENT (OUTPATIENT)
Dept: HEMATOLOGY ONCOLOGY | Facility: CLINIC | Age: 70
End: 2020-02-27
Payer: COMMERCIAL

## 2020-02-27 VITALS
WEIGHT: 143.96 LBS | SYSTOLIC BLOOD PRESSURE: 136 MMHG | HEART RATE: 72 BPM | DIASTOLIC BLOOD PRESSURE: 70 MMHG | BODY MASS INDEX: 24.49 KG/M2 | OXYGEN SATURATION: 100 % | TEMPERATURE: 98.5 F | RESPIRATION RATE: 16 BRPM

## 2020-02-27 DIAGNOSIS — G47.33 OBSTRUCTIVE SLEEP APNEA (ADULT) (PEDIATRIC): ICD-10-CM

## 2020-02-27 DIAGNOSIS — E78.5 HYPERLIPIDEMIA, UNSPECIFIED: ICD-10-CM

## 2020-02-27 LAB
HCT VFR BLD CALC: 33.6 % — LOW (ref 39–50)
HGB BLD-MCNC: 11.4 G/DL — LOW (ref 13–17)
MCHC RBC-ENTMCNC: 29.1 PG — SIGNIFICANT CHANGE UP (ref 27–34)
MCHC RBC-ENTMCNC: 33.8 G/DL — SIGNIFICANT CHANGE UP (ref 32–36)
MCV RBC AUTO: 86.2 FL — SIGNIFICANT CHANGE UP (ref 80–100)
PLATELET # BLD AUTO: 93 K/UL — LOW (ref 150–400)
RBC # BLD: 3.9 M/UL — LOW (ref 4.2–5.8)
RBC # FLD: 14.1 % — SIGNIFICANT CHANGE UP (ref 10.3–14.5)
WBC # BLD: 0.3 K/UL — CRITICAL LOW (ref 3.8–10.5)
WBC # FLD AUTO: 0.3 K/UL — CRITICAL LOW (ref 3.8–10.5)

## 2020-02-27 PROCEDURE — 99214 OFFICE O/P EST MOD 30 MIN: CPT

## 2020-02-28 ENCOUNTER — TRANSCRIPTION ENCOUNTER (OUTPATIENT)
Age: 70
End: 2020-02-28

## 2020-03-01 NOTE — REVIEW OF SYSTEMS
[Fatigue] : fatigue [Constipation] : constipation [Negative] : Psychiatric [FreeTextEntry2] : x days now resolving  [FreeTextEntry4] : no taste to food  [de-identified] : herpetic pain less left shoulder upper only

## 2020-03-01 NOTE — HISTORY OF PRESENT ILLNESS
[Disease:__________________________] : Disease: [unfilled] [Cycle: ___] : Cycle: [unfilled] [de-identified] : Mr. Hopson is a 70 yo WM with a small bowel mass and associated mild hydronephrosis.  He presented with anemia in routine labs i(Hgb 11.8) n May, 2019\par He was admitted to [facility] 1/[facility] with abdominal pain and was found to have an SBO.  CT scan showed SBO with transition in RLQ, no significant adenopathy, small amount of ascites. Sxs and SBO resolved with NGT decompression and has not recurred.  Cologard had been (-)  3/2018.  There was lab evidence of iron deficiency.  Colonoscopy 8/1/19 was aborted due to poor prep.  Repeat on August 14, 2019 showed erythema in rectum and diverticular dz, with rectal bxs showing focal active colitis.   He developed abd pain after the recent colonoscopy and repeat CT scan on 8/28/19 showed a new large apple core mass in lower abd/upper pelvis, 8.6 x 6.8 x 6.2 cm with marked nodular circumferential wall thickening.  Mild left hydroureteronephrosis was seen.  There was no LAD or H/S megaly.  Ascites was not noted. \par He continues to have intermittent post-prandial dull discomfort to the left of umbilicus and extending to LLQ.  He has had no melena, hematochezia or change in bowel habits.  He has had no constitutional symptoms other than unintended 8 pound weight loss in the last few weeks.\par He has a h/o ASHD and had a stent placed in the LAD  in 2013.   Prostate cancer was treated in 2008 with Cyberknife at [place] in 2008.\par He has had regular urologic f/u's and he reports that his PSA is undetectable.\par He has DANIEL and uses a dental device; he previously responded for a few years after uvulectomy. [FreeTextEntry1] : RCHOP plus Onpro  [de-identified] : DLBCL s/p 5 cycles of R-CHOP. with response followed by SBO small  resection without evidence of residual dz. Has now recovered form surgery. \par CT abdomen (pre surgery): IMPRESSION: Small bowel obstruction with transition point at the level of the jejunal mass as seen on prior CT with interval increase in dilation of the involved bowel loops. \par During ayesha-op TPN consulted for Pt w/ Protein-Calorie Malnutrition and patient started on TPN. \par Hypertension - maintain on oral hypertensive agent. \par Hyperlipidemia- on Lipitor.\par PET /CT scan -  Resolution of FDG avid peritoneal and intraluminal small bowel foci. Several residual intraluminal small bowel foci with FDG avidity slightly above liver background (Deauville 4). Interval postsurgical changes of small bowel resection. to receive total 4 more cycles of Rchop\par post herpetic pain - add Cymbalta and Gabapentin \par tolerating treatment well increase fatigue noted denies fever, chills n/v abd pain no diarrhea or constipation.denies bone pain\par \par \par \par

## 2020-03-03 ENCOUNTER — RESULT REVIEW (OUTPATIENT)
Age: 70
End: 2020-03-03

## 2020-03-03 ENCOUNTER — APPOINTMENT (OUTPATIENT)
Dept: INFUSION THERAPY | Facility: HOSPITAL | Age: 70
End: 2020-03-03

## 2020-03-03 ENCOUNTER — LABORATORY RESULT (OUTPATIENT)
Age: 70
End: 2020-03-03

## 2020-03-03 LAB
BASOPHILS # BLD AUTO: 0 K/UL — SIGNIFICANT CHANGE UP (ref 0–0.2)
EOSINOPHIL # BLD AUTO: 0.1 K/UL — SIGNIFICANT CHANGE UP (ref 0–0.5)
HCT VFR BLD CALC: 33.1 % — LOW (ref 39–50)
HGB BLD-MCNC: 10.9 G/DL — LOW (ref 13–17)
LYMPHOCYTES # BLD AUTO: 0.4 K/UL — LOW (ref 1–3.3)
LYMPHOCYTES # BLD AUTO: 4 % — LOW (ref 13–44)
MCHC RBC-ENTMCNC: 28.4 PG — SIGNIFICANT CHANGE UP (ref 27–34)
MCHC RBC-ENTMCNC: 32.9 G/DL — SIGNIFICANT CHANGE UP (ref 32–36)
MCV RBC AUTO: 86.3 FL — SIGNIFICANT CHANGE UP (ref 80–100)
MONOCYTES # BLD AUTO: 0.7 K/UL — SIGNIFICANT CHANGE UP (ref 0–0.9)
MONOCYTES NFR BLD AUTO: 7 % — SIGNIFICANT CHANGE UP (ref 2–14)
MYELOCYTES NFR BLD: 3 % — HIGH (ref 0–0)
NEUTROPHILS # BLD AUTO: 6.8 K/UL — SIGNIFICANT CHANGE UP (ref 1.8–7.4)
NEUTROPHILS NFR BLD AUTO: 86 % — HIGH (ref 43–77)
PLAT MORPH BLD: NORMAL — SIGNIFICANT CHANGE UP
PLATELET # BLD AUTO: 108 K/UL — LOW (ref 150–400)
RBC # BLD: 3.83 M/UL — LOW (ref 4.2–5.8)
RBC # FLD: 14.9 % — HIGH (ref 10.3–14.5)
RBC BLD AUTO: SIGNIFICANT CHANGE UP
WBC # BLD: 8 K/UL — SIGNIFICANT CHANGE UP (ref 3.8–10.5)
WBC # FLD AUTO: 8 K/UL — SIGNIFICANT CHANGE UP (ref 3.8–10.5)

## 2020-03-05 ENCOUNTER — APPOINTMENT (OUTPATIENT)
Dept: HEMATOLOGY ONCOLOGY | Facility: CLINIC | Age: 70
End: 2020-03-05

## 2020-03-10 ENCOUNTER — RESULT REVIEW (OUTPATIENT)
Age: 70
End: 2020-03-10

## 2020-03-10 ENCOUNTER — APPOINTMENT (OUTPATIENT)
Dept: HEMATOLOGY ONCOLOGY | Facility: CLINIC | Age: 70
End: 2020-03-10
Payer: COMMERCIAL

## 2020-03-10 VITALS
HEART RATE: 86 BPM | WEIGHT: 143.96 LBS | OXYGEN SATURATION: 100 % | TEMPERATURE: 98.8 F | BODY MASS INDEX: 24.49 KG/M2 | SYSTOLIC BLOOD PRESSURE: 138 MMHG | DIASTOLIC BLOOD PRESSURE: 83 MMHG | RESPIRATION RATE: 17 BRPM

## 2020-03-10 LAB
HCT VFR BLD CALC: 30.8 % — LOW (ref 39–50)
HGB BLD-MCNC: 10.7 G/DL — LOW (ref 13–17)
MCHC RBC-ENTMCNC: 28.5 PG — SIGNIFICANT CHANGE UP (ref 27–34)
MCHC RBC-ENTMCNC: 34.7 GM/DL — SIGNIFICANT CHANGE UP (ref 32–36)
MCV RBC AUTO: 82.1 FL — SIGNIFICANT CHANGE UP (ref 80–100)
NRBC # BLD: 0 /100 WBCS — SIGNIFICANT CHANGE UP (ref 0–0)
PLATELET # BLD AUTO: 64 K/UL — LOW (ref 150–400)
RBC # BLD: 3.75 M/UL — LOW (ref 4.2–5.8)
RBC # FLD: 15.7 % — HIGH (ref 10.3–14.5)
WBC # BLD: 0.12 K/UL — CRITICAL LOW (ref 3.8–10.5)
WBC # FLD AUTO: 0.12 K/UL — CRITICAL LOW (ref 3.8–10.5)

## 2020-03-10 PROCEDURE — 99214 OFFICE O/P EST MOD 30 MIN: CPT

## 2020-03-10 NOTE — HISTORY OF PRESENT ILLNESS
[Disease:__________________________] : Disease: [unfilled] [Cycle: ___] : Cycle: [unfilled] [de-identified] : Mr. Hopson is a 70 yo WM with a small bowel mass and associated mild hydronephrosis.  He presented with anemia in routine labs i(Hgb 11.8) n May, 2019\par He was admitted to [facility] in 1/2018 with abdominal pain and was found to have an SBO.  CT scan showed SBO with transition in RLQ, no significant adenopathy, small amount of ascites. Sxs and SBO resolved with NGT decompression and has not recurred.  Cologard had been (-)  3/2018.  There was lab evidence of iron deficiency.  Colonoscopy 8/1/19 was aborted due to poor prep.  Repeat on August 14, 2019 showed erythema in rectum and diverticular dz, with rectal bxs showing focal active colitis.   He developed abd pain after the recent colonoscopy and repeat CT scan on 8/28/19 showed a new large apple core mass in lower abd/upper pelvis, 8.6 x 6.8 x 6.2 cm with marked nodular circumferential wall thickening.  Mild left hydroureteronephrosis was seen.  There was no LAD or H/S megaly.  Ascites was not noted. \par He continues to have intermittent post-prandial dull discomfort to the left of umbilicus and extending to LLQ.  He has had no melena, hematochezia or change in bowel habits.  He has had no constitutional symptoms other than unintended 8 pound weight loss in the last few weeks.\par He has a h/o ASHD and had a stent placed in the LAD  in 2013.   Prostate cancer was treated in 2008 with Cyberknife at [place] in 2008.\par He has had regular urologic f/u's and he reports that his PSA is undetectable.\par He has DANIEL and uses a dental device; he previously responded for a few years after uvulectomy. [FreeTextEntry1] : R-CHOP plus Onpro  [de-identified] : DLBCL s/p 6 cycles of R-CHOP with response followed by SBO small resection without evidence of residual dz. Today is day 8 of cycle 6 of R-CHOP.\par Hypertension - maintained on oral hypertensive agent. \par Hyperlipidemia- on Lipitor.\par PET /CT scan -  Resolution of FDG avid peritoneal and intraluminal small bowel foci. Several residual intraluminal small bowel foci with FDG avidity slightly above liver background (Deauville 4). \par Post herpetic pain - on Cymbalta and Gabapentin; well controlled.\par Tolerating treatment well; has plantar neuropathy both feet, stable. Constipation, on ppx, Levaquin.\par Today CBC shows WBC 0.12, Hgb 10.7, Plt 64K.

## 2020-03-10 NOTE — ASSESSMENT
[Curative] : Goals of care discussed with patient: Curative [FreeTextEntry1] : DLBCL, GCB type non-DH. \par S/p R-CHOP, cycle 6.\par Has recovered from SBO with SB  resection. No residual dz seen by path.\par Major response after 2nd cycle of R-CHOP, post surgery . Post op changes vs residual dz.\par First cycle complicated by febrile neutropenia, Klebsiella bacteremia.\par Therapy intensified with last three cycles given q14 days.\par Post- herpetic neuralgia controlled with present regimen of Neurontin and Cymbalta. \par Profound neutropenia day 8 of cycle. Continue Levaquin 500mg 2X/D for 4 more days. Admit if febrile.\par To continue Bactrim DS one 3 x a week. \par RV  in about 3 week

## 2020-03-10 NOTE — CONSULT LETTER
[Dear  ___] : Dear  [unfilled], [Consult Letter:] : I had the pleasure of evaluating your patient, [unfilled]. [Please see my note below.] : Please see my note below. [Consult Closing:] : Thank you very much for allowing me to participate in the care of this patient.  If you have any questions, please do not hesitate to contact me. [Sincerely,] : Sincerely, [DrAmanda  ___] : Dr. FAUST [FreeTextEntry2] : Everette Cunha M.D. [FreeTextEntry3] : Adi Kingston M.D., [facility]\par

## 2020-03-10 NOTE — REVIEW OF SYSTEMS
[Fatigue] : fatigue [Constipation] : constipation [Negative] : Psychiatric [FreeTextEntry2] : x days now resolving  [FreeTextEntry4] : no taste to food  [de-identified] : herpetic pain less left shoulder upper only

## 2020-03-10 NOTE — ADDENDUM
[FreeTextEntry1] : Documented by Amarjit Holly acting as a scribe for Dr. Adi Kingston on 03/10/2020\par \par All medical record entries made by a scribe were at my, Dr. Adi Kingston direction and personally dictated by me on 03/10/2020 . I have reviewed the chart and agree that the record accurately reflects my personal performance of the history, physical exam, procedure and imaging.

## 2020-03-17 PROBLEM — Z51.11 ENCOUNTER FOR CHEMOTHERAPY MANAGEMENT: Status: ACTIVE | Noted: 2019-10-17

## 2020-03-17 PROBLEM — K63.9 SMALL BOWEL LESION: Status: ACTIVE | Noted: 2019-09-04

## 2020-03-17 NOTE — REVIEW OF SYSTEMS
[Patient Intake Form Reviewed] : Patient intake form was reviewed [Fatigue] : fatigue [Negative] : Allergic/Immunologic [Night Sweats] : no night sweats [Abdominal Pain] : abdominal pain [Vomiting] : no vomiting [Constipation] : constipation [Diarrhea] : no diarrhea [Dysuria] : no dysuria [FreeTextEntry4] : dysguesia [FreeTextEntry8] : h/o prostate ca [de-identified] : occas headaches

## 2020-03-17 NOTE — PHYSICAL EXAM
[Restricted in physically strenuous activity but ambulatory and able to carry out work of a light or sedentary nature] : Status 1- Restricted in physically strenuous activity but ambulatory and able to carry out work of a light or sedentary nature, e.g., light house work, office work [Normal] : normoactive bowel sounds, soft and nontender, no hepatosplenomegaly or masses appreciated [de-identified] : no mouth sores  [de-identified] : No palpable mass LLQ [de-identified] : no CVAT [de-identified] : No cervical, axillary or inguinal LAD noted

## 2020-03-17 NOTE — HISTORY OF PRESENT ILLNESS
[Disease:__________________________] : Disease: [unfilled] [Cycle: ___] : Cycle: [unfilled] [Day: ___] : Day: [unfilled] [de-identified] : Mr. Hopson is a 68 yo WM with a small bowel mass and associated mild hydronephrosis.  He presented with anemia in routine labs i(Hgb 11.8) n May, 2019\par He was admitted to [facility] in 1/2018 with abdominal pain and was found to have an SBO.  CT scan showed SBO with transition in RLQ, no significant adenopathy, small amount of ascites. Sxs and SBO resolved with NGT decompression and has not recurred.  Cologard had been (-)  3/2018.  There was lab evidence of iron deficiency.  Colonoscopy 8/1/19 was aborted due to poor prep.  Repeat on August 14, 2019 showed erythema in rectum and diverticular dz, with rectal bxs showing focal active colitis.   He developed abd pain after the recent colonoscopy and repeat CT scan on 8/28/19 showed a new large apple core mass in lower abd/upper pelvis, 8.6 x 6.8 x 6.2 cm with marked nodular circumferential wall thickening.  Mild left hydroureteronephrosis was seen.  There was no LAD or H/S megaly.  Ascites was not noted. \par He continues to have intermittent post-prandial dull discomfort to the left of umbilicus and extending to LLQ.  He has had no melena, hematochezia or change in bowel habits.  He has had no constitutional symptoms other than unintended 8 pound weight loss in the last few weeks.\par He has a h/o ASHD and had a stent placed in the LAD  in 2013.   Prostate cancer was treated in 2008 with Cyberknife at [place] in 2008.\par He has had regular urologic f/u's and he reports that his PSA is undetectable.\par He has DANIEL and uses a dental device; he previously responded for a few years after uvulectomy. [FreeTextEntry1] : BABAK with Onpro [de-identified] : 11/7/2019 Office Visit:\par Here for follow-up for DLBCL\par \par Today is Cycle 2 Day 8 of RCHOP with Onpro support (given 10/31/2019).  Patient also received Feraheme and Influenza vaccine.  Patient tolerated chemo well.  He c/o fatigue.  He reports an improved appetite without recent weight loss.  He reports occas abd pain secondary to constipation, which is improved.  He has occas headaches which improve with Tylenol.  Patient denies any fever/chills, recent infections, CP, SOB, n/v/d, dizziness, night sweats, swollen glands, rashes or any unexplained bleeding/bruising. 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 Cycle 2 Day 8 of R-CHOP. \par Hospitalized 10/17-10/20/19 at [facility] for neutropenic fever, klebsiella bacteremia.\par Iron deficiency- s/p 2 doses of IV iron

## 2020-03-26 ENCOUNTER — APPOINTMENT (OUTPATIENT)
Dept: HEMATOLOGY ONCOLOGY | Facility: CLINIC | Age: 70
End: 2020-03-26

## 2020-03-30 ENCOUNTER — OUTPATIENT (OUTPATIENT)
Dept: OUTPATIENT SERVICES | Facility: HOSPITAL | Age: 70
LOS: 1 days | Discharge: HOME | End: 2020-03-30

## 2020-03-30 DIAGNOSIS — C83.30 DIFFUSE LARGE B-CELL LYMPHOMA, UNSPECIFIED SITE: ICD-10-CM

## 2020-04-06 ENCOUNTER — APPOINTMENT (OUTPATIENT)
Dept: HEMATOLOGY ONCOLOGY | Facility: CLINIC | Age: 70
End: 2020-04-06
Payer: COMMERCIAL

## 2020-04-06 PROCEDURE — 99213 OFFICE O/P EST LOW 20 MIN: CPT

## 2020-04-06 RX ORDER — SULFAMETHOXAZOLE AND TRIMETHOPRIM 800; 160 MG/1; MG/1
800-160 TABLET ORAL
Qty: 36 | Refills: 8 | Status: DISCONTINUED | COMMUNITY
Start: 2020-01-31 | End: 2020-04-06

## 2020-04-06 RX ORDER — LEVOFLOXACIN 500 MG/1
500 TABLET, FILM COATED ORAL
Qty: 4 | Refills: 0 | Status: DISCONTINUED | COMMUNITY
Start: 2020-03-10 | End: 2020-04-06

## 2020-04-06 RX ORDER — METOCLOPRAMIDE 10 MG/1
10 TABLET ORAL
Qty: 60 | Refills: 6 | Status: DISCONTINUED | COMMUNITY
Start: 2019-10-09 | End: 2020-04-06

## 2020-04-06 NOTE — HISTORY OF PRESENT ILLNESS
[Home] : at home, [unfilled] , at the time of the visit. [Other Location: e.g. Home (Enter Location, City,State)___] : at [unfilled] [Spouse] : spouse [Patient] : the patient [Self] : self [Disease:__________________________] : Disease: [unfilled] [Cycle: ___] : Cycle: [unfilled] [FreeTextEntry2] : Verbal consent given on            at           by                        (patient).                \par \par Verbal consent given on            at           by                        (patient).                \par \par Verbal consent obtained on 4/6/2020 at 11:45 AM from Trae Hopson [FreeTextEntry4] : wife [de-identified] : Mr. Hopson is a 70 yo WM with a small bowel mass and associated mild hydronephrosis.  He presented with anemia in routine labs i(Hgb 11.8) n May, 2019\par He was admitted to [facility] in 1/2018 with abdominal pain and was found to have an SBO.  CT scan showed SBO with transition in RLQ, no significant adenopathy, small amount of ascites. Sxs and SBO resolved with NGT decompression and has not recurred.  Cologard had been (-)  3/2018.  There was lab evidence of iron deficiency.  Colonoscopy 8/1/19 was aborted due to poor prep.  Repeat on August 14, 2019 showed erythema in rectum and diverticular dz, with rectal bxs showing focal active colitis.   He developed abd pain after the recent colonoscopy and repeat CT scan on 8/28/19 showed a new large apple core mass in lower abd/upper pelvis, 8.6 x 6.8 x 6.2 cm with marked nodular circumferential wall thickening.  Mild left hydroureteronephrosis was seen.  There was no LAD or H/S megaly.  Ascites was not noted. \par He continues to have intermittent post-prandial dull discomfort to the left of umbilicus and extending to LLQ.  He has had no melena, hematochezia or change in bowel habits.  He has had no constitutional symptoms other than unintended 8 pound weight loss in the last few weeks.\par He has a h/o ASHD and had a stent placed in the LAD  in 2013.   Prostate cancer was treated in 2008 with Cyberknife at [place] in 2008.\par He has had regular urologic f/u's and he reports that his PSA is undetectable.\par He has DANIEL and uses a dental device; he previously responded for a few years after uvulectomy. [de-identified] : DLBCL, GCB type [FreeTextEntry1] : R-CHOP plus Onpro  [de-identified] : DLBCL s/p 6 cycles of R-CHOP with response followed by SBO small resection without evidence of residual Completed 6 cycles R-CHOP about 5 weeks ago\par Feels stronger though still has fatigue, no constit c/o\par Hypertension - maintained on oral hypertensive agent. - checked BP today 117/71, P 59\par Hyperlipidemia- on Lipitor.\par PET /CT scan -  Resolution of FDG avid peritoneal and intraluminal small bowel foci. Several residual intraluminal small bowel foci with FDG avidity slightly above liver background (Deauville 4). \par Post herpetic pain - on Cymbalta and Gabapentin; well controlled.- still having benefit from  meds\par Tolerating treatment well; has plantar neuropathy both feet, stable.

## 2020-04-06 NOTE — CONSULT LETTER
[Dear  ___] : Dear  [unfilled], [Consult Letter:] : I had the pleasure of evaluating your patient, [unfilled]. [Please see my note below.] : Please see my note below. [Consult Closing:] : Thank you very much for allowing me to participate in the care of this patient.  If you have any questions, please do not hesitate to contact me. [Sincerely,] : Sincerely, [DrAmanda  ___] : Dr. FAUST [FreeTextEntry2] : Everette Cunha M.D. [FreeTextEntry3] : Adi Kingston M.D., [facility]\par

## 2020-04-06 NOTE — REVIEW OF SYSTEMS
[Fatigue] : fatigue [Constipation] : constipation [Negative] : Allergic/Immunologic [FreeTextEntry2] : x days now resolving  [de-identified] : herpetic pain less left shoulder upper only

## 2020-04-06 NOTE — ASSESSMENT
[Curative] : Goals of care discussed with patient: Curative [FreeTextEntry1] : DLBCL, GCB type non-DH. \par S/p R-CHOP x 6 with last 3 cycles given on a q14 d schedule post periop delay\par Has recovered from SBO with SB  resection. No residual dz seen by path.\par Major response after 2nd cycle of R-CHOP, post surgery . Post op changes vs residual dz.\par First cycle complicated by febrile neutropenia, Klebsiella bacteremia.\par Therapy intensified with last three cycles given q14 days.\par Post- herpetic neuralgia controlled with present regimen of Neurontin and Cymbalta. \par Will have CBC, CMP, LDH checked\par Sched PET/CT for 5/2020\par RV 3 mos

## 2020-04-13 ENCOUNTER — LABORATORY RESULT (OUTPATIENT)
Age: 70
End: 2020-04-13

## 2020-05-11 ENCOUNTER — OUTPATIENT (OUTPATIENT)
Dept: OUTPATIENT SERVICES | Facility: HOSPITAL | Age: 70
LOS: 1 days | End: 2020-05-11

## 2020-05-11 ENCOUNTER — RESULT REVIEW (OUTPATIENT)
Age: 70
End: 2020-05-11

## 2020-05-11 ENCOUNTER — APPOINTMENT (OUTPATIENT)
Dept: NUCLEAR MEDICINE | Facility: CLINIC | Age: 70
End: 2020-05-11
Payer: COMMERCIAL

## 2020-05-11 DIAGNOSIS — C83.30 DIFFUSE LARGE B-CELL LYMPHOMA, UNSPECIFIED SITE: ICD-10-CM

## 2020-05-11 PROCEDURE — 78815 PET IMAGE W/CT SKULL-THIGH: CPT | Mod: 26,PS

## 2020-05-15 ENCOUNTER — TRANSCRIPTION ENCOUNTER (OUTPATIENT)
Age: 70
End: 2020-05-15

## 2020-05-18 ENCOUNTER — TRANSCRIPTION ENCOUNTER (OUTPATIENT)
Age: 70
End: 2020-05-18

## 2020-05-21 ENCOUNTER — TRANSCRIPTION ENCOUNTER (OUTPATIENT)
Age: 70
End: 2020-05-21

## 2020-06-02 RX ORDER — GABAPENTIN 300 MG/1
300 CAPSULE ORAL
Qty: 90 | Refills: 5 | Status: DISCONTINUED | COMMUNITY
Start: 2020-01-23 | End: 2020-06-02

## 2020-06-02 RX ORDER — PREDNISONE 50 MG/1
50 TABLET ORAL
Qty: 60 | Refills: 0 | Status: DISCONTINUED | COMMUNITY
Start: 2019-10-09 | End: 2020-06-02

## 2020-06-02 RX ORDER — GABAPENTIN 300 MG/1
300 CAPSULE ORAL 3 TIMES DAILY
Qty: 90 | Refills: 0 | Status: DISCONTINUED | COMMUNITY
Start: 2019-12-26 | End: 2020-06-02

## 2020-06-03 ENCOUNTER — APPOINTMENT (OUTPATIENT)
Dept: SURGERY | Facility: CLINIC | Age: 70
End: 2020-06-03
Payer: COMMERCIAL

## 2020-06-03 VITALS
TEMPERATURE: 97.8 F | DIASTOLIC BLOOD PRESSURE: 93 MMHG | HEART RATE: 69 BPM | RESPIRATION RATE: 15 BRPM | SYSTOLIC BLOOD PRESSURE: 158 MMHG

## 2020-06-03 PROCEDURE — 99204 OFFICE O/P NEW MOD 45 MIN: CPT

## 2020-06-03 NOTE — CONSULT LETTER
[Dear  ___] : Dear ~OZIEL, [FreeTextEntry2] : Dr. Everette Cunha [Sincerely,] : Sincerely, [DrAmanda  ___] : Dr. FAUST [FreeTextEntry1] : I saw your patient Trae Hopson in the office on June 3rd for evaluation of a groin hernia. He stated that sometime last year, while undergoing chemotherapy for his non-Hodgkin's lymphoma, he noted a bulge in the right groin. As you know he ultimately required a bowel resection but following that surgery he was able to successfully complete the chemotherapy and is now disease-free. Since the end of last year, the bulge in the right groin has progressed somewhat in size and Mr. Hopson has been experiencing intermittent, mild local discomfort. He stated that the bulge reduces with recumbency and he denied other abdominal symptoms or change in bowel habits.\par \par On exam the abdomen was soft, nondistended and nontender without palpable mass or organomegaly. A well-healed periumbilical midline surgical scar was present related to the prior bowel resection and the fascial closure appeared fully intact. Examination of the left groin revealed no palpable hernia but on the right, a moderate sized, nontender, reducible inguinal hernia was apparent. The remainder of the exam was noncontributory.\par \par I discussed the situation with Mr. Hopson and advised elective repair of his bowel containing right inguinal hernia in the ambulatory OR. Once he comes to surgery I will update you on his status and thank very much for allowing me to participate in this pleasant gentleman's care [FreeTextEntry3] : \par \par Elmer Cunha M.D., F.A.C.S.

## 2020-06-03 NOTE — PLAN
[FreeTextEntry1] : Advised elective repair in ambulatory OR. Discussed with patient nature of indications for and risks/benefits of surgery.

## 2020-06-03 NOTE — HISTORY OF PRESENT ILLNESS
[de-identified] : Last year, while undergoing chemotherapy for non-Hodgkin's lymphoma, patient noted a bulge in the right groin. He eventually developed a large intra-abdominal mass for which he then underwent laparotomy and bowel resection. He then completed his chemotherapy and at this point is considered disease free. Since first noting the hernia, the bulge has increased somewhat in size and the patient has been experiencing intermittent, mild right groin discomfort. The bulge reduces with recumbency and patient has noted gurgling sounds in the area of the bulge. Appetite and bowel habits remain normal. [de-identified] : Trae is a 68 y/o male here for evaluation of right inguinal bulge.

## 2020-06-03 NOTE — PHYSICAL EXAM
[de-identified] : Soft, nondistended, nontender. No palpable mass or organomegaly well-healed periumbilical midline surgical scar. Left groin-  no palpable hernia. Right groin-  moderate sized, nontender, reducible inguinal hernia.

## 2020-06-03 NOTE — ASSESSMENT
[FreeTextEntry1] : 69-year-old male status post successful treatment of non-Hodgkin's lymphoma, now with bowel containing right inguinal hernia.

## 2020-06-08 ENCOUNTER — TRANSCRIPTION ENCOUNTER (OUTPATIENT)
Age: 70
End: 2020-06-08

## 2020-06-25 ENCOUNTER — OUTPATIENT (OUTPATIENT)
Dept: OUTPATIENT SERVICES | Facility: HOSPITAL | Age: 70
LOS: 1 days | Discharge: HOME | End: 2020-06-25

## 2020-06-25 DIAGNOSIS — C83.30 DIFFUSE LARGE B-CELL LYMPHOMA, UNSPECIFIED SITE: ICD-10-CM

## 2020-06-29 ENCOUNTER — RESULT REVIEW (OUTPATIENT)
Age: 70
End: 2020-06-29

## 2020-06-29 ENCOUNTER — APPOINTMENT (OUTPATIENT)
Dept: HEMATOLOGY ONCOLOGY | Facility: CLINIC | Age: 70
End: 2020-06-29

## 2020-06-29 ENCOUNTER — LABORATORY RESULT (OUTPATIENT)
Age: 70
End: 2020-06-29

## 2020-06-29 LAB
BASOPHILS # BLD AUTO: 0.02 K/UL — SIGNIFICANT CHANGE UP (ref 0–0.2)
BASOPHILS NFR BLD AUTO: 0.4 % — SIGNIFICANT CHANGE UP (ref 0–2)
EOSINOPHIL # BLD AUTO: 0.13 K/UL — SIGNIFICANT CHANGE UP (ref 0–0.5)
EOSINOPHIL NFR BLD AUTO: 2.9 % — SIGNIFICANT CHANGE UP (ref 0–6)
HCT VFR BLD CALC: 40.8 % — SIGNIFICANT CHANGE UP (ref 39–50)
HGB BLD-MCNC: 13.6 G/DL — SIGNIFICANT CHANGE UP (ref 13–17)
IMM GRANULOCYTES NFR BLD AUTO: 0.2 % — SIGNIFICANT CHANGE UP (ref 0–1.5)
LYMPHOCYTES # BLD AUTO: 0.48 K/UL — LOW (ref 1–3.3)
LYMPHOCYTES # BLD AUTO: 10.6 % — LOW (ref 13–44)
MCHC RBC-ENTMCNC: 29.6 PG — SIGNIFICANT CHANGE UP (ref 27–34)
MCHC RBC-ENTMCNC: 33.3 GM/DL — SIGNIFICANT CHANGE UP (ref 32–36)
MCV RBC AUTO: 88.9 FL — SIGNIFICANT CHANGE UP (ref 80–100)
MONOCYTES # BLD AUTO: 0.55 K/UL — SIGNIFICANT CHANGE UP (ref 0–0.9)
MONOCYTES NFR BLD AUTO: 12.1 % — SIGNIFICANT CHANGE UP (ref 2–14)
NEUTROPHILS # BLD AUTO: 3.34 K/UL — SIGNIFICANT CHANGE UP (ref 1.8–7.4)
NEUTROPHILS NFR BLD AUTO: 73.8 % — SIGNIFICANT CHANGE UP (ref 43–77)
NRBC # BLD: 0 /100 WBCS — SIGNIFICANT CHANGE UP (ref 0–0)
PLATELET # BLD AUTO: 189 K/UL — SIGNIFICANT CHANGE UP (ref 150–400)
RBC # BLD: 4.59 M/UL — SIGNIFICANT CHANGE UP (ref 4.2–5.8)
RBC # FLD: 12.5 % — SIGNIFICANT CHANGE UP (ref 10.3–14.5)
WBC # BLD: 4.53 K/UL — SIGNIFICANT CHANGE UP (ref 3.8–10.5)
WBC # FLD AUTO: 4.53 K/UL — SIGNIFICANT CHANGE UP (ref 3.8–10.5)

## 2020-07-02 DIAGNOSIS — Z01.818 ENCOUNTER FOR OTHER PREPROCEDURAL EXAMINATION: ICD-10-CM

## 2020-07-03 ENCOUNTER — APPOINTMENT (OUTPATIENT)
Dept: DISASTER EMERGENCY | Facility: CLINIC | Age: 70
End: 2020-07-03

## 2020-07-04 LAB — SARS-COV-2 N GENE NPH QL NAA+PROBE: NOT DETECTED

## 2020-07-06 ENCOUNTER — RESULT REVIEW (OUTPATIENT)
Age: 70
End: 2020-07-06

## 2020-07-06 ENCOUNTER — OUTPATIENT (OUTPATIENT)
Dept: OUTPATIENT SERVICES | Facility: HOSPITAL | Age: 70
LOS: 1 days | End: 2020-07-06
Payer: COMMERCIAL

## 2020-07-06 DIAGNOSIS — C83.30 DIFFUSE LARGE B-CELL LYMPHOMA, UNSPECIFIED SITE: ICD-10-CM

## 2020-07-06 PROCEDURE — 36590 REMOVAL TUNNELED CV CATH: CPT

## 2020-07-06 PROCEDURE — 77001 FLUOROGUIDE FOR VEIN DEVICE: CPT

## 2020-07-06 PROCEDURE — 77001 FLUOROGUIDE FOR VEIN DEVICE: CPT | Mod: 26

## 2020-07-09 ENCOUNTER — RESULT REVIEW (OUTPATIENT)
Age: 70
End: 2020-07-09

## 2020-07-09 ENCOUNTER — APPOINTMENT (OUTPATIENT)
Dept: HEMATOLOGY ONCOLOGY | Facility: CLINIC | Age: 70
End: 2020-07-09
Payer: COMMERCIAL

## 2020-07-09 VITALS
DIASTOLIC BLOOD PRESSURE: 92 MMHG | TEMPERATURE: 98.1 F | SYSTOLIC BLOOD PRESSURE: 169 MMHG | HEART RATE: 65 BPM | WEIGHT: 151.01 LBS | BODY MASS INDEX: 25.69 KG/M2 | OXYGEN SATURATION: 99 % | RESPIRATION RATE: 15 BRPM

## 2020-07-09 DIAGNOSIS — M10.9 GOUT, UNSPECIFIED: ICD-10-CM

## 2020-07-09 DIAGNOSIS — C85.90 NON-HODGKIN LYMPHOMA, UNSPECIFIED, UNSPECIFIED SITE: ICD-10-CM

## 2020-07-09 DIAGNOSIS — Z45.2 ENCOUNTER FOR ADJUSTMENT AND MANAGEMENT OF VASCULAR ACCESS DEVICE: ICD-10-CM

## 2020-07-09 LAB
BASOPHILS # BLD AUTO: 0.02 K/UL — SIGNIFICANT CHANGE UP (ref 0–0.2)
BASOPHILS NFR BLD AUTO: 0.4 % — SIGNIFICANT CHANGE UP (ref 0–2)
EOSINOPHIL # BLD AUTO: 0.1 K/UL — SIGNIFICANT CHANGE UP (ref 0–0.5)
EOSINOPHIL NFR BLD AUTO: 2.2 % — SIGNIFICANT CHANGE UP (ref 0–6)
HCT VFR BLD CALC: 40.5 % — SIGNIFICANT CHANGE UP (ref 39–50)
HGB BLD-MCNC: 13.4 G/DL — SIGNIFICANT CHANGE UP (ref 13–17)
IMM GRANULOCYTES NFR BLD AUTO: 0.7 % — SIGNIFICANT CHANGE UP (ref 0–1.5)
LYMPHOCYTES # BLD AUTO: 0.5 K/UL — LOW (ref 1–3.3)
LYMPHOCYTES # BLD AUTO: 10.9 % — LOW (ref 13–44)
MCHC RBC-ENTMCNC: 29.3 PG — SIGNIFICANT CHANGE UP (ref 27–34)
MCHC RBC-ENTMCNC: 33.1 GM/DL — SIGNIFICANT CHANGE UP (ref 32–36)
MCV RBC AUTO: 88.4 FL — SIGNIFICANT CHANGE UP (ref 80–100)
MONOCYTES # BLD AUTO: 0.56 K/UL — SIGNIFICANT CHANGE UP (ref 0–0.9)
MONOCYTES NFR BLD AUTO: 12.2 % — SIGNIFICANT CHANGE UP (ref 2–14)
NEUTROPHILS # BLD AUTO: 3.37 K/UL — SIGNIFICANT CHANGE UP (ref 1.8–7.4)
NEUTROPHILS NFR BLD AUTO: 73.6 % — SIGNIFICANT CHANGE UP (ref 43–77)
NRBC # BLD: 0 /100 WBCS — SIGNIFICANT CHANGE UP (ref 0–0)
PLATELET # BLD AUTO: 198 K/UL — SIGNIFICANT CHANGE UP (ref 150–400)
RBC # BLD: 4.58 M/UL — SIGNIFICANT CHANGE UP (ref 4.2–5.8)
RBC # FLD: 12.8 % — SIGNIFICANT CHANGE UP (ref 10.3–14.5)
WBC # BLD: 4.58 K/UL — SIGNIFICANT CHANGE UP (ref 3.8–10.5)
WBC # FLD AUTO: 4.58 K/UL — SIGNIFICANT CHANGE UP (ref 3.8–10.5)

## 2020-07-09 PROCEDURE — 99214 OFFICE O/P EST MOD 30 MIN: CPT

## 2020-07-09 RX ORDER — ALLOPURINOL 100 MG/1
100 TABLET ORAL
Qty: 30 | Refills: 5 | Status: DISCONTINUED | COMMUNITY
Start: 2020-01-14 | End: 2020-07-09

## 2020-07-09 RX ORDER — DULOXETINE HYDROCHLORIDE 60 MG/1
60 CAPSULE, DELAYED RELEASE PELLETS ORAL
Qty: 30 | Refills: 4 | Status: DISCONTINUED | COMMUNITY
Start: 2020-01-23 | End: 2020-07-09

## 2020-07-09 RX ORDER — ALLOPURINOL 100 MG/1
100 TABLET ORAL
Refills: 0 | Status: ACTIVE | COMMUNITY
Start: 2020-07-09

## 2020-07-19 NOTE — HISTORY OF PRESENT ILLNESS
[Disease:__________________________] : Disease: [unfilled] [Cycle: ___] : Cycle: [unfilled] [de-identified] : Mr. Hopson is a 70 yo WM with a small bowel mass and associated mild hydronephrosis.  He presented with anemia in routine labs i(Hgb 11.8) n May, 2019\par He was admitted to [facility] in 1/2018 with abdominal pain and was found to have an SBO.  CT scan showed SBO with transition in RLQ, no significant adenopathy, small amount of ascites. Sxs and SBO resolved with NGT decompression and has not recurred.  Cologard had been (-)  3/2018.  There was lab evidence of iron deficiency.  Colonoscopy 8/1/19 was aborted due to poor prep.  Repeat on August 14, 2019 showed erythema in rectum and diverticular dz, with rectal bxs showing focal active colitis.   He developed abd pain after the recent colonoscopy and repeat CT scan on 8/28/19 showed a new large apple core mass in lower abd/upper pelvis, 8.6 x 6.8 x 6.2 cm with marked nodular circumferential wall thickening.  Mild left hydroureteronephrosis was seen.  There was no LAD or H/S megaly.  Ascites was not noted. \par He continues to have intermittent post-prandial dull discomfort to the left of umbilicus and extending to LLQ.  He has had no melena, hematochezia or change in bowel habits.  He has had no constitutional symptoms other than unintended 8 pound weight loss in the last few weeks.\par He has a h/o ASHD and had a stent placed in the LAD  in 2013.   Prostate cancer was treated in 2008 with Cyberknife at [place] in 2008.\par He has had regular urologic f/u's and he reports that his PSA is undetectable.\par He has DANIEL and uses a dental device; he previously responded for a few years after uvulectomy. [FreeTextEntry1] : R-CHOP plus Onpro (2x cycles RCHOP21, then 4x cycles RCHOP14 due to interruption of therapy with lymphoma related SBO [de-identified] : DLBCL, GCB type [de-identified] : DLBCL s/p 6 cycles of R-CHOP with response followed by SBO small resection without evidence of residual Completed 6 cycles R-CHOP in March 2020\par Fatigue is resolved, strength is better, gaining weight (now 68 kg)\par Denies recent fever, chills, night sweats\par Hypertension - likely Desi white coat syndrome, /92 today, fine at regular PCP office and at home (~120s/70s on medication)\par PET /CT scan -  Resolution of FDG avid peritoneal and intraluminal small bowel foci. Several residual intraluminal small bowel foci with FDG avidity slightly above liver background (Deauville 4). \par Post herpetic pain - no longer needing duloxetine\par Has plantar neuropathy both feet, improving from week to week.\par Scheduled for Aug 11, 2020 right inguinal hernia repair \par CBC WNL today

## 2020-07-19 NOTE — END OF VISIT
[] : Fellow [FreeTextEntry3] : Doing well, essentially fully recovered from chemotx.\par Imaging post next visit.

## 2020-07-19 NOTE — PHYSICAL EXAM
[Fully active, able to carry on all pre-disease performance without restriction] : Status 0 - Fully active, able to carry on all pre-disease performance without restriction [Normal] : grossly intact [Mucositis] : no mucositis [Ulcers] : no ulcers [Thrush] : no thrush

## 2020-07-19 NOTE — REVIEW OF SYSTEMS
[Fatigue] : fatigue [Constipation] : constipation [Negative] : Eyes [Dizziness] : no dizziness [Confused] : no confusion [Difficulty Walking] : no difficulty walking [Fainting] : no fainting [de-identified] : plantar neuropathy

## 2020-07-19 NOTE — ASSESSMENT
[Curative] : Goals of care discussed with patient: Curative [FreeTextEntry1] : DLBCL, GCB type non-DH. S/p R-CHOP x 6 with last 3 cycles given on a q14 d schedule post periop delay\par Has recovered from SBO with SB  resection. Major response after 2nd cycle of R-CHOP, post surgery . Post op changes vs residual dz.\par First cycle complicated by febrile neutropenia, Klebsiella bacteremia.\par Therapy intensified with last three cycles given q14 days.\par PET/CT for 5/2020: Resolution of intraluminal small bowel foci (Deauville 1)\par Post- herpetic neuralgia controlled with present regimen of Neurontin and Cymbalta. Now resolved / discontinued.\par Will have CBC, CMP, LDH checked\par \par RV 3 mos\par \par Seen with Dr Kingston\par \par Roberto Escobar\par Heme / On Fellow PGY-6

## 2020-08-05 ENCOUNTER — OUTPATIENT (OUTPATIENT)
Dept: OUTPATIENT SERVICES | Facility: HOSPITAL | Age: 70
LOS: 1 days | End: 2020-08-05
Payer: COMMERCIAL

## 2020-08-05 VITALS
HEIGHT: 64 IN | SYSTOLIC BLOOD PRESSURE: 140 MMHG | WEIGHT: 151.9 LBS | HEART RATE: 64 BPM | OXYGEN SATURATION: 100 % | DIASTOLIC BLOOD PRESSURE: 80 MMHG | TEMPERATURE: 98 F | RESPIRATION RATE: 16 BRPM

## 2020-08-05 DIAGNOSIS — I25.10 ATHEROSCLEROTIC HEART DISEASE OF NATIVE CORONARY ARTERY WITHOUT ANGINA PECTORIS: ICD-10-CM

## 2020-08-05 DIAGNOSIS — Z90.49 ACQUIRED ABSENCE OF OTHER SPECIFIED PARTS OF DIGESTIVE TRACT: Chronic | ICD-10-CM

## 2020-08-05 DIAGNOSIS — K40.90 UNILATERAL INGUINAL HERNIA, WITHOUT OBSTRUCTION OR GANGRENE, NOT SPECIFIED AS RECURRENT: ICD-10-CM

## 2020-08-05 DIAGNOSIS — Z01.818 ENCOUNTER FOR OTHER PREPROCEDURAL EXAMINATION: ICD-10-CM

## 2020-08-05 DIAGNOSIS — I10 ESSENTIAL (PRIMARY) HYPERTENSION: ICD-10-CM

## 2020-08-05 LAB
ANION GAP SERPL CALC-SCNC: 15 MMOL/L — SIGNIFICANT CHANGE UP (ref 5–17)
BUN SERPL-MCNC: 18 MG/DL — SIGNIFICANT CHANGE UP (ref 7–23)
CALCIUM SERPL-MCNC: 9.7 MG/DL — SIGNIFICANT CHANGE UP (ref 8.4–10.5)
CHLORIDE SERPL-SCNC: 103 MMOL/L — SIGNIFICANT CHANGE UP (ref 96–108)
CO2 SERPL-SCNC: 22 MMOL/L — SIGNIFICANT CHANGE UP (ref 22–31)
CREAT SERPL-MCNC: 1.06 MG/DL — SIGNIFICANT CHANGE UP (ref 0.5–1.3)
GLUCOSE SERPL-MCNC: 127 MG/DL — HIGH (ref 70–99)
HCT VFR BLD CALC: 41.8 % — SIGNIFICANT CHANGE UP (ref 39–50)
HGB BLD-MCNC: 13.1 G/DL — SIGNIFICANT CHANGE UP (ref 13–17)
MCHC RBC-ENTMCNC: 27.8 PG — SIGNIFICANT CHANGE UP (ref 27–34)
MCHC RBC-ENTMCNC: 31.3 GM/DL — LOW (ref 32–36)
MCV RBC AUTO: 88.7 FL — SIGNIFICANT CHANGE UP (ref 80–100)
NRBC # BLD: 0 /100 WBCS — SIGNIFICANT CHANGE UP (ref 0–0)
PLATELET # BLD AUTO: 201 K/UL — SIGNIFICANT CHANGE UP (ref 150–400)
POTASSIUM SERPL-MCNC: 4.6 MMOL/L — SIGNIFICANT CHANGE UP (ref 3.5–5.3)
POTASSIUM SERPL-SCNC: 4.6 MMOL/L — SIGNIFICANT CHANGE UP (ref 3.5–5.3)
RBC # BLD: 4.71 M/UL — SIGNIFICANT CHANGE UP (ref 4.2–5.8)
RBC # FLD: 13.3 % — SIGNIFICANT CHANGE UP (ref 10.3–14.5)
SODIUM SERPL-SCNC: 140 MMOL/L — SIGNIFICANT CHANGE UP (ref 135–145)
WBC # BLD: 3.99 K/UL — SIGNIFICANT CHANGE UP (ref 3.8–10.5)
WBC # FLD AUTO: 3.99 K/UL — SIGNIFICANT CHANGE UP (ref 3.8–10.5)

## 2020-08-05 PROCEDURE — 85027 COMPLETE CBC AUTOMATED: CPT

## 2020-08-05 PROCEDURE — G0463: CPT

## 2020-08-05 PROCEDURE — 80048 BASIC METABOLIC PNL TOTAL CA: CPT

## 2020-08-05 RX ORDER — SODIUM CHLORIDE 9 MG/ML
3 INJECTION INTRAMUSCULAR; INTRAVENOUS; SUBCUTANEOUS EVERY 8 HOURS
Refills: 0 | Status: DISCONTINUED | OUTPATIENT
Start: 2020-08-11 | End: 2020-08-26

## 2020-08-05 RX ORDER — LIDOCAINE HCL 20 MG/ML
0.2 VIAL (ML) INJECTION ONCE
Refills: 0 | Status: DISCONTINUED | OUTPATIENT
Start: 2020-08-11 | End: 2020-08-26

## 2020-08-05 NOTE — H&P PST ADULT - HISTORY OF PRESENT ILLNESS
70M with h/o B cell lymphoma (with a jejunal mass, 2018) s/p chemo &RT, HTN, HLD, CAD with stented cor artery (LADS 2013), prostate ca (2018 s/p cyber knife) c/o right groin bulge x 8 months. Pt had surgical consult-s/p CT scan revealed right inguinal hernia- scheduled for right inguinal hernia repair on 8/11/2020    **Covid 19 PCR scheduled on 8/8/2020

## 2020-08-05 NOTE — H&P PST ADULT - NSICDXPROBLEM_GEN_ALL_CORE_FT
PROBLEM DIAGNOSES  Problem: Inguinal hernia  Assessment and Plan: Right inguinal hernia repair  Labs- CBC, BMP  Pre op instructions discussed    Problem: Benign hypertension  Assessment and Plan: continue with meds    Problem: CAD (coronary artery disease)  Assessment and Plan: continue with Aspirin

## 2020-08-05 NOTE — H&P PST ADULT - NSICDXPASTMEDICALHX_GEN_ALL_CORE_FT
PAST MEDICAL HISTORY:  Anemia, mild     CAD (coronary artery disease) s/p stent in LAD (2013) - no longer on anticoagulation    Gout     HTN (hypertension)     Hypercholesteremia     Hypothyroidism     Lymphoma B cell- s/p chemo & RT , last chemo 3/3/2020    Prostate cancer s/p cyber knife 2008

## 2020-08-08 ENCOUNTER — APPOINTMENT (OUTPATIENT)
Dept: DISASTER EMERGENCY | Facility: CLINIC | Age: 70
End: 2020-08-08

## 2020-08-08 LAB — SARS-COV-2 N GENE NPH QL NAA+PROBE: NOT DETECTED

## 2020-08-10 ENCOUNTER — TRANSCRIPTION ENCOUNTER (OUTPATIENT)
Age: 70
End: 2020-08-10

## 2020-08-10 RX ORDER — SODIUM CHLORIDE 9 MG/ML
1000 INJECTION, SOLUTION INTRAVENOUS
Refills: 0 | Status: DISCONTINUED | OUTPATIENT
Start: 2020-08-11 | End: 2020-08-26

## 2020-08-10 RX ORDER — OXYCODONE HYDROCHLORIDE 5 MG/1
5 TABLET ORAL ONCE
Refills: 0 | Status: DISCONTINUED | OUTPATIENT
Start: 2020-08-11 | End: 2020-08-11

## 2020-08-10 RX ORDER — ONDANSETRON 8 MG/1
4 TABLET, FILM COATED ORAL ONCE
Refills: 0 | Status: DISCONTINUED | OUTPATIENT
Start: 2020-08-11 | End: 2020-08-26

## 2020-08-11 ENCOUNTER — OUTPATIENT (OUTPATIENT)
Dept: OUTPATIENT SERVICES | Facility: HOSPITAL | Age: 70
LOS: 1 days | Discharge: HOME | End: 2020-08-11
Payer: COMMERCIAL

## 2020-08-11 ENCOUNTER — RESULT REVIEW (OUTPATIENT)
Age: 70
End: 2020-08-11

## 2020-08-11 ENCOUNTER — APPOINTMENT (OUTPATIENT)
Dept: SURGERY | Facility: HOSPITAL | Age: 70
End: 2020-08-11
Payer: COMMERCIAL

## 2020-08-11 VITALS
HEART RATE: 91 BPM | DIASTOLIC BLOOD PRESSURE: 73 MMHG | OXYGEN SATURATION: 98 % | HEIGHT: 64 IN | TEMPERATURE: 97 F | RESPIRATION RATE: 18 BRPM | SYSTOLIC BLOOD PRESSURE: 129 MMHG | WEIGHT: 151.9 LBS

## 2020-08-11 VITALS
OXYGEN SATURATION: 100 % | SYSTOLIC BLOOD PRESSURE: 148 MMHG | HEART RATE: 67 BPM | DIASTOLIC BLOOD PRESSURE: 70 MMHG | RESPIRATION RATE: 12 BRPM

## 2020-08-11 DIAGNOSIS — Z90.49 ACQUIRED ABSENCE OF OTHER SPECIFIED PARTS OF DIGESTIVE TRACT: Chronic | ICD-10-CM

## 2020-08-11 DIAGNOSIS — K40.90 UNILATERAL INGUINAL HERNIA, WITHOUT OBSTRUCTION OR GANGRENE, NOT SPECIFIED AS RECURRENT: ICD-10-CM

## 2020-08-11 DIAGNOSIS — Z01.818 ENCOUNTER FOR OTHER PREPROCEDURAL EXAMINATION: ICD-10-CM

## 2020-08-11 PROCEDURE — 88302 TISSUE EXAM BY PATHOLOGIST: CPT | Mod: 26

## 2020-08-11 PROCEDURE — 49505 PRP I/HERN INIT REDUC >5 YR: CPT | Mod: RT

## 2020-08-11 PROCEDURE — C1781: CPT

## 2020-08-11 PROCEDURE — 88302 TISSUE EXAM BY PATHOLOGIST: CPT

## 2020-08-11 RX ORDER — ACETAMINOPHEN 500 MG
2 TABLET ORAL
Qty: 0 | Refills: 0 | DISCHARGE

## 2020-08-11 RX ORDER — ASPIRIN/CALCIUM CARB/MAGNESIUM 324 MG
1 TABLET ORAL
Qty: 0 | Refills: 0 | DISCHARGE

## 2020-08-11 RX ORDER — CEFAZOLIN SODIUM 1 G
2000 VIAL (EA) INJECTION ONCE
Refills: 0 | Status: COMPLETED | OUTPATIENT
Start: 2020-08-11 | End: 2020-08-11

## 2020-08-11 RX ORDER — CHLORHEXIDINE GLUCONATE 213 G/1000ML
1 SOLUTION TOPICAL ONCE
Refills: 0 | Status: COMPLETED | OUTPATIENT
Start: 2020-08-11 | End: 2020-08-11

## 2020-08-11 RX ORDER — ALLOPURINOL 300 MG
1 TABLET ORAL
Qty: 0 | Refills: 0 | DISCHARGE

## 2020-08-11 RX ORDER — METOPROLOL TARTRATE 50 MG
1 TABLET ORAL
Qty: 0 | Refills: 0 | DISCHARGE

## 2020-08-11 RX ADMIN — CHLORHEXIDINE GLUCONATE 1 APPLICATION(S): 213 SOLUTION TOPICAL at 13:35

## 2020-08-11 NOTE — ASU DISCHARGE PLAN (ADULT/PEDIATRIC) - NURSING INSTRUCTIONS
Next dose of Tylenol will be on or after __1030PM_________ ,today/tonight and every 6 hours afterwards for pain management, do not take any Tylenol containing products until this time. Your first dose of Tylenol was given at __0422PM_________. Do not exceed more than 4000mg of Tylenol in one 24 hour setting. If no contraindications, you may alternate with Ibuprofen or Naproxen 3 hours after dose of Tylenol. Ibuprofen can be taken every 6 hours. Naproxen may be taken every 12 hours.

## 2020-08-11 NOTE — ASU DISCHARGE PLAN (ADULT/PEDIATRIC) - CARE PROVIDER_API CALL
Elmer Cunha J  SURGERY  [address] [place] [address]  Phone: [phone]  Fax: [phone]  Established Patient  Follow Up Time: 2 weeks

## 2020-08-11 NOTE — ASU PATIENT PROFILE, ADULT - PMH
Anemia, mild    CAD (coronary artery disease)  s/p stent in LAD (2013) - no longer on anticoagulation  Gout    HTN (hypertension)    Hypercholesteremia    Hypothyroidism    Lymphoma  B cell- s/p chemo & RT , last chemo 3/3/2020  Prostate cancer  s/p cyber knife 2008

## 2020-08-12 PROBLEM — M10.9 GOUT, UNSPECIFIED: Chronic | Status: ACTIVE | Noted: 2020-08-05

## 2020-08-12 PROBLEM — C85.90 NON-HODGKIN LYMPHOMA, UNSPECIFIED, UNSPECIFIED SITE: Chronic | Status: ACTIVE | Noted: 2019-09-16

## 2020-08-12 PROBLEM — D64.9 ANEMIA, UNSPECIFIED: Chronic | Status: ACTIVE | Noted: 2020-08-05

## 2020-08-25 PROBLEM — Z09 POSTOPERATIVE EXAMINATION: Status: ACTIVE | Noted: 2020-08-25

## 2020-08-26 ENCOUNTER — APPOINTMENT (OUTPATIENT)
Dept: SURGERY | Facility: CLINIC | Age: 70
End: 2020-08-26
Payer: COMMERCIAL

## 2020-08-26 VITALS
HEART RATE: 76 BPM | TEMPERATURE: 97.3 F | RESPIRATION RATE: 15 BRPM | OXYGEN SATURATION: 98 % | DIASTOLIC BLOOD PRESSURE: 100 MMHG | SYSTOLIC BLOOD PRESSURE: 169 MMHG

## 2020-08-26 DIAGNOSIS — Z09 ENCOUNTER FOR FOLLOW-UP EXAMINATION AFTER COMPLETED TREATMENT FOR CONDITIONS OTHER THAN MALIGNANT NEOPLASM: ICD-10-CM

## 2020-08-26 PROCEDURE — 99024 POSTOP FOLLOW-UP VISIT: CPT

## 2020-08-26 RX ORDER — OXYCODONE AND ACETAMINOPHEN 5; 325 MG/1; MG/1
5-325 TABLET ORAL
Qty: 6 | Refills: 0 | Status: DISCONTINUED | COMMUNITY
Start: 2020-08-11 | End: 2020-08-26

## 2020-08-26 NOTE — CONSULT LETTER
[Dear  ___] : Dear ~OZIEL, [Sincerely,] : Sincerely, [FreeTextEntry2] : Dr. Everette Cunha [FreeTextEntry1] : I saw Trae Hopson back in the office for a postop check on August 26th. Since undergoing his right inguinal hernia repair on the 11th, Mr. Hopson has done quite well. At today's visit he stated that he was pain-free except for some mild testicular soreness.\par \par On exam, the abdomen was soft, nondistended and nontender and the right groin incision was noted to be healing well. The repair was fully intact and there were no signs of infection present. A small seroma was noted at the external inguinal ring and some mild posterior tenderness was present on the right testicle.\par \par I reassured Mr. Hopson that the testicular tenderness and the seroma will resolve completely over the next month or 2 and I suggested that he liberalize his activities as he feels able. I have otherwise discharged him for any further postoperative followup.\par Thanks again for allowing me to participate in this pleasant gentleman's care. [FreeTextEntry3] : \par \par Elmer Cunha M.D., F.A.C.S.

## 2020-08-26 NOTE — ASSESSMENT
[FreeTextEntry1] : Status post right renal hernia repair; normal postop course except for small seroma.

## 2020-08-26 NOTE — PLAN
[FreeTextEntry1] : Patient advised to gradually liberalize activities as tolerated and return here p.r.n.   Patient also reassured that small seroma should resolve completely over the next couple of months.

## 2020-08-26 NOTE — HISTORY OF PRESENT ILLNESS
[de-identified] : Trae is a 69 y/o male here for post-operative visit. 8/11/20- right inguinal hernia repair with medium oval. \par  [de-identified] : Status post right inguinal hernia repair on 8/11/20. At present has only minimal residual discomfort with some testicular soreness.

## 2020-08-26 NOTE — PHYSICAL EXAM
[de-identified] : Soft, nondistended, nontender. Right groin incision healing well with normal ridge. Repair fully intact. No signs of infection. Small seroma at the level of the external inguinal ring. Very mild posterior tenderness on the right testicle.

## 2020-09-03 NOTE — ASU PATIENT PROFILE, ADULT - ACCEPTABLE
Patient called to schedule GYN visit and has Medicaid.  Advised we are not accepting any new patients for GYN with Medicaid and to call [facility] or [facility].    Patient verbalized understanding.   0

## 2020-09-28 ENCOUNTER — OUTPATIENT (OUTPATIENT)
Dept: OUTPATIENT SERVICES | Facility: HOSPITAL | Age: 70
LOS: 1 days | Discharge: HOME | End: 2020-09-28

## 2020-09-28 DIAGNOSIS — C83.30 DIFFUSE LARGE B-CELL LYMPHOMA, UNSPECIFIED SITE: ICD-10-CM

## 2020-09-28 DIAGNOSIS — Z90.49 ACQUIRED ABSENCE OF OTHER SPECIFIED PARTS OF DIGESTIVE TRACT: Chronic | ICD-10-CM

## 2020-10-06 ENCOUNTER — RESULT REVIEW (OUTPATIENT)
Age: 70
End: 2020-10-06

## 2020-10-06 ENCOUNTER — APPOINTMENT (OUTPATIENT)
Dept: HEMATOLOGY ONCOLOGY | Facility: CLINIC | Age: 70
End: 2020-10-06
Payer: COMMERCIAL

## 2020-10-06 VITALS
HEIGHT: 64.29 IN | SYSTOLIC BLOOD PRESSURE: 140 MMHG | TEMPERATURE: 97.3 F | DIASTOLIC BLOOD PRESSURE: 90 MMHG | WEIGHT: 150.79 LBS | OXYGEN SATURATION: 99 % | HEART RATE: 66 BPM | RESPIRATION RATE: 14 BRPM | BODY MASS INDEX: 25.74 KG/M2

## 2020-10-06 DIAGNOSIS — Z86.69 PERSONAL HISTORY OF OTHER DISEASES OF THE NERVOUS SYSTEM AND SENSE ORGANS: ICD-10-CM

## 2020-10-06 DIAGNOSIS — K21.9 GASTRO-ESOPHAGEAL REFLUX DISEASE W/OUT ESOPHAGITIS: ICD-10-CM

## 2020-10-06 LAB
BASOPHILS # BLD AUTO: 0.03 K/UL — SIGNIFICANT CHANGE UP (ref 0–0.2)
BASOPHILS NFR BLD AUTO: 0.8 % — SIGNIFICANT CHANGE UP (ref 0–2)
EOSINOPHIL # BLD AUTO: 0.09 K/UL — SIGNIFICANT CHANGE UP (ref 0–0.5)
EOSINOPHIL NFR BLD AUTO: 2.4 % — SIGNIFICANT CHANGE UP (ref 0–6)
HCT VFR BLD CALC: 38 % — LOW (ref 39–50)
HGB BLD-MCNC: 12.4 G/DL — LOW (ref 13–17)
IMM GRANULOCYTES NFR BLD AUTO: 0.3 % — SIGNIFICANT CHANGE UP (ref 0–1.5)
LYMPHOCYTES # BLD AUTO: 0.5 K/UL — LOW (ref 1–3.3)
LYMPHOCYTES # BLD AUTO: 13.2 % — SIGNIFICANT CHANGE UP (ref 13–44)
MCHC RBC-ENTMCNC: 29.2 PG — SIGNIFICANT CHANGE UP (ref 27–34)
MCHC RBC-ENTMCNC: 32.6 G/DL — SIGNIFICANT CHANGE UP (ref 32–36)
MCV RBC AUTO: 89.6 FL — SIGNIFICANT CHANGE UP (ref 80–100)
MONOCYTES # BLD AUTO: 0.44 K/UL — SIGNIFICANT CHANGE UP (ref 0–0.9)
MONOCYTES NFR BLD AUTO: 11.6 % — SIGNIFICANT CHANGE UP (ref 2–14)
NEUTROPHILS # BLD AUTO: 2.71 K/UL — SIGNIFICANT CHANGE UP (ref 1.8–7.4)
NEUTROPHILS NFR BLD AUTO: 71.7 % — SIGNIFICANT CHANGE UP (ref 43–77)
NRBC # BLD: 0 /100 WBCS — SIGNIFICANT CHANGE UP (ref 0–0)
PLATELET # BLD AUTO: 198 K/UL — SIGNIFICANT CHANGE UP (ref 150–400)
RBC # BLD: 4.24 M/UL — SIGNIFICANT CHANGE UP (ref 4.2–5.8)
RBC # FLD: 14.4 % — SIGNIFICANT CHANGE UP (ref 10.3–14.5)
WBC # BLD: 3.78 K/UL — LOW (ref 3.8–10.5)
WBC # FLD AUTO: 3.78 K/UL — LOW (ref 3.8–10.5)

## 2020-10-06 PROCEDURE — 99214 OFFICE O/P EST MOD 30 MIN: CPT

## 2020-10-06 NOTE — PHYSICAL EXAM
[Fully active, able to carry on all pre-disease performance without restriction] : Status 0 - Fully active, able to carry on all pre-disease performance without restriction [Normal] : affect appropriate [Ulcers] : no ulcers [Mucositis] : no mucositis [Thrush] : no thrush [de-identified] : no CVAT

## 2020-10-06 NOTE — REVIEW OF SYSTEMS
[Fatigue] : fatigue [Negative] : Allergic/Immunologic [Confused] : no confusion [Dizziness] : no dizziness [Fainting] : no fainting [Difficulty Walking] : no difficulty walking [FreeTextEntry7] : as above [de-identified] : plantar neuropathy as above

## 2020-10-06 NOTE — HISTORY OF PRESENT ILLNESS
[Disease:__________________________] : Disease: [unfilled] [Cycle: ___] : Cycle: [unfilled] [de-identified] : Mr. Hopson is a 68 yo WM with a small bowel mass and associated mild hydronephrosis.  He presented with anemia in routine labs i(Hgb 11.8) n May, 2019\par He was admitted to [facility] in 1/2018 with abdominal pain and was found to have an SBO.  CT scan showed SBO with transition in RLQ, no significant adenopathy, small amount of ascites. Sxs and SBO resolved with NGT decompression and has not recurred.  Cologard had been (-)  3/2018.  There was lab evidence of iron deficiency.  Colonoscopy 8/1/19 was aborted due to poor prep.  Repeat on August 14, 2019 showed erythema in rectum and diverticular dz, with rectal bxs showing focal active colitis.   He developed abd pain after the recent colonoscopy and repeat CT scan on 8/28/19 showed a new large apple core mass in lower abd/upper pelvis, 8.6 x 6.8 x 6.2 cm with marked nodular circumferential wall thickening.  Mild left hydroureteronephrosis was seen.  There was no LAD or H/S megaly.  Ascites was not noted. \par He continues to have intermittent post-prandial dull discomfort to the left of umbilicus and extending to LLQ.  He has had no melena, hematochezia or change in bowel habits.  He has had no constitutional symptoms other than unintended 8 pound weight loss in the last few weeks.\par He has a h/o ASHD and had a stent placed in the LAD  in 2013.   Prostate cancer was treated in 2008 with Cyberknife at [place] in 2008.\par He has had regular urologic f/u's and he reports that his PSA is undetectable.\par He has DANIEL and uses a dental device; he previously responded for a few years after uvulectomy. [de-identified] : DLBCL, GCB type [FreeTextEntry1] : R-CHOP plus Onpro (2x cycles RCHOP21, then 4x cycles RCHOP14 due to interruption of therapy with lymphoma related SBO [de-identified] : Completed 6 cycles R-CHOP in March 2020\par Fatigue is resolved, strength is better, regained weight \par Denies recent fever, chills, night sweat\par Post herpetic pain - no longer needing duloxetine, resolved\par Has plantar neuropathy both feet still persists.\par had  right inguinal hernia repair  8/11/20, has persistent right groin, testicular discomfort, improving slightly over time\par WBC, Hgb sl decr today

## 2020-10-06 NOTE — ASSESSMENT
[Curative] : Goals of care discussed with patient: Curative [FreeTextEntry1] : DLBCL, GCB type non-DH. S/p R-CHOP x 6 with last 3 cycles given on a q14 d schedule post periop delay\par Fully recovered from SBO with SB resection.  \par PET/CT for 5/2020: Resolution of intraluminal small bowel foci (Deauville 1)\par Post- herpetic neuralgia resolved\par Feeling well overall except for post RIH discomfort\par Cont post op f/u as needed with Dr. NOLAN Cunha\par f/u CT C/A/P with contrast mid 11/2020\par Check CMP, LDH\par RV 3 mos\par \par \par Roberto Escobar\par Heme / On Fellow PGY-6

## 2020-10-31 ENCOUNTER — RESULT REVIEW (OUTPATIENT)
Age: 70
End: 2020-10-31

## 2020-10-31 ENCOUNTER — OUTPATIENT (OUTPATIENT)
Dept: OUTPATIENT SERVICES | Facility: HOSPITAL | Age: 70
LOS: 1 days | End: 2020-10-31

## 2020-10-31 ENCOUNTER — APPOINTMENT (OUTPATIENT)
Dept: CT IMAGING | Facility: CLINIC | Age: 70
End: 2020-10-31
Payer: COMMERCIAL

## 2020-10-31 DIAGNOSIS — Z00.8 ENCOUNTER FOR OTHER GENERAL EXAMINATION: ICD-10-CM

## 2020-10-31 DIAGNOSIS — Z90.49 ACQUIRED ABSENCE OF OTHER SPECIFIED PARTS OF DIGESTIVE TRACT: Chronic | ICD-10-CM

## 2020-10-31 PROCEDURE — 74177 CT ABD & PELVIS W/CONTRAST: CPT | Mod: 26

## 2020-10-31 PROCEDURE — 71260 CT THORAX DX C+: CPT | Mod: 26

## 2020-12-08 NOTE — ED ADULT NURSE NOTE - CAS EDN DISCHARGE ASSESSMENT
Seeing the ophthalmologist tomorrow advised to discuss with the ophthalmologist ongoing headaches and update me with the recommendations Alert and oriented to person, place and time

## 2020-12-24 ENCOUNTER — OUTPATIENT (OUTPATIENT)
Dept: OUTPATIENT SERVICES | Facility: HOSPITAL | Age: 70
LOS: 1 days | Discharge: HOME | End: 2020-12-24

## 2020-12-24 DIAGNOSIS — Z90.49 ACQUIRED ABSENCE OF OTHER SPECIFIED PARTS OF DIGESTIVE TRACT: Chronic | ICD-10-CM

## 2020-12-24 DIAGNOSIS — C83.30 DIFFUSE LARGE B-CELL LYMPHOMA, UNSPECIFIED SITE: ICD-10-CM

## 2021-01-04 ENCOUNTER — RESULT REVIEW (OUTPATIENT)
Age: 71
End: 2021-01-04

## 2021-01-04 ENCOUNTER — APPOINTMENT (OUTPATIENT)
Dept: HEMATOLOGY ONCOLOGY | Facility: CLINIC | Age: 71
End: 2021-01-04
Payer: COMMERCIAL

## 2021-01-04 VITALS
TEMPERATURE: 97.4 F | OXYGEN SATURATION: 100 % | HEART RATE: 56 BPM | WEIGHT: 152.34 LBS | DIASTOLIC BLOOD PRESSURE: 63 MMHG | SYSTOLIC BLOOD PRESSURE: 171 MMHG | RESPIRATION RATE: 16 BRPM | BODY MASS INDEX: 26.33 KG/M2 | HEIGHT: 63.78 IN

## 2021-01-04 LAB
BASOPHILS # BLD AUTO: 0.02 K/UL — SIGNIFICANT CHANGE UP (ref 0–0.2)
BASOPHILS NFR BLD AUTO: 0.5 % — SIGNIFICANT CHANGE UP (ref 0–2)
EOSINOPHIL # BLD AUTO: 0.16 K/UL — SIGNIFICANT CHANGE UP (ref 0–0.5)
EOSINOPHIL NFR BLD AUTO: 4.4 % — SIGNIFICANT CHANGE UP (ref 0–6)
HCT VFR BLD CALC: 39.4 % — SIGNIFICANT CHANGE UP (ref 39–50)
HGB BLD-MCNC: 12.4 G/DL — LOW (ref 13–17)
IMM GRANULOCYTES NFR BLD AUTO: 0.5 % — SIGNIFICANT CHANGE UP (ref 0–1.5)
LYMPHOCYTES # BLD AUTO: 0.66 K/UL — LOW (ref 1–3.3)
LYMPHOCYTES # BLD AUTO: 18 % — SIGNIFICANT CHANGE UP (ref 13–44)
MCHC RBC-ENTMCNC: 28.3 PG — SIGNIFICANT CHANGE UP (ref 27–34)
MCHC RBC-ENTMCNC: 31.5 G/DL — LOW (ref 32–36)
MCV RBC AUTO: 90 FL — SIGNIFICANT CHANGE UP (ref 80–100)
MONOCYTES # BLD AUTO: 0.5 K/UL — SIGNIFICANT CHANGE UP (ref 0–0.9)
MONOCYTES NFR BLD AUTO: 13.6 % — SIGNIFICANT CHANGE UP (ref 2–14)
NEUTROPHILS # BLD AUTO: 2.31 K/UL — SIGNIFICANT CHANGE UP (ref 1.8–7.4)
NEUTROPHILS NFR BLD AUTO: 63 % — SIGNIFICANT CHANGE UP (ref 43–77)
NRBC # BLD: 0 /100 WBCS — SIGNIFICANT CHANGE UP (ref 0–0)
PLATELET # BLD AUTO: 185 K/UL — SIGNIFICANT CHANGE UP (ref 150–400)
RBC # BLD: 4.38 M/UL — SIGNIFICANT CHANGE UP (ref 4.2–5.8)
RBC # FLD: 13.9 % — SIGNIFICANT CHANGE UP (ref 10.3–14.5)
WBC # BLD: 3.67 K/UL — LOW (ref 3.8–10.5)
WBC # FLD AUTO: 3.67 K/UL — LOW (ref 3.8–10.5)

## 2021-01-04 PROCEDURE — 99072 ADDL SUPL MATRL&STAF TM PHE: CPT

## 2021-01-04 PROCEDURE — 99213 OFFICE O/P EST LOW 20 MIN: CPT

## 2021-01-04 RX ORDER — LEVOTHYROXINE SODIUM 100 MCG
100 TABLET ORAL DAILY
Refills: 0 | Status: DISCONTINUED | COMMUNITY
End: 2021-01-04

## 2021-01-04 RX ORDER — LEVOTHYROXINE SODIUM 0.11 MG/1
112 TABLET ORAL
Qty: 90 | Refills: 0 | Status: ACTIVE | COMMUNITY
Start: 2020-10-28

## 2021-01-05 NOTE — PHYSICAL EXAM
[Fully active, able to carry on all pre-disease performance without restriction] : Status 0 - Fully active, able to carry on all pre-disease performance without restriction [Ulcers] : no ulcers [Mucositis] : no mucositis [Thrush] : no thrush [Normal] : no JVD, no calf tenderness, venous stasis changes, varices [de-identified] : no CVAT

## 2021-01-05 NOTE — CONSULT LETTER
[DrAmanda  ___] : Dr. FAUST [DrAmanda ___] : Dr. FAUST [FreeTextEntry3] : Adi Kingston M.D., [facility]\par

## 2021-01-05 NOTE — REVIEW OF SYSTEMS
[Fatigue] : fatigue [Negative] : Allergic/Immunologic [Confused] : no confusion [Dizziness] : no dizziness [Fainting] : no fainting [Difficulty Walking] : no difficulty walking [FreeTextEntry7] : as above [de-identified] : plantar neuropathy as above

## 2021-01-05 NOTE — ASSESSMENT
[Curative] : Goals of care discussed with patient: Curative [FreeTextEntry1] : DLBCL, GCB type non-DH. S/p R-CHOP x 6 with last 3 cycles given on a q14 d schedule post periop delay\par Fully recovered from SBO with SB resection.  \par PET/CT for 5/2020: Resolution of intraluminal small bowel foci (Deauville 1)\par CT C/A/P 10/31/20 NAD\par Post- herpetic neuralgia resolved\par no longer needs f/u with Dr. NOLAN Cunha\par  \par Check CMP, LDH, beta 2 MG\par RV 3 mos\par \par \par

## 2021-01-05 NOTE — HISTORY OF PRESENT ILLNESS
[Disease:__________________________] : Disease: [unfilled] [Cycle: ___] : Cycle: [unfilled] [de-identified] : Mr. Hopson is a 70 yo WM with a small bowel mass and associated mild hydronephrosis.  He presented with anemia in routine labs i(Hgb 11.8) n May, 2019\par He was admitted to [facility] in 1/2018 with abdominal pain and was found to have an SBO.  CT scan showed SBO with transition in RLQ, no significant adenopathy, small amount of ascites. Sxs and SBO resolved with NGT decompression and has not recurred.  Cologard had been (-)  3/2018.  There was lab evidence of iron deficiency.  Colonoscopy 8/1/19 was aborted due to poor prep.  Repeat on August 14, 2019 showed erythema in rectum and diverticular dz, with rectal bxs showing focal active colitis.   He developed abd pain after the recent colonoscopy and repeat CT scan on 8/28/19 showed a new large apple core mass in lower abd/upper pelvis, 8.6 x 6.8 x 6.2 cm with marked nodular circumferential wall thickening.  Mild left hydroureteronephrosis was seen.  There was no LAD or H/S megaly.  Ascites was not noted. \par He continues to have intermittent post-prandial dull discomfort to the left of umbilicus and extending to LLQ.  He has had no melena, hematochezia or change in bowel habits.  He has had no constitutional symptoms other than unintended 8 pound weight loss in the last few weeks.\par He has a h/o ASHD and had a stent placed in the LAD  in 2013.   Prostate cancer was treated in 2008 with Cyberknife at [place] in 2008.\par He has had regular urologic f/u's and he reports that his PSA is undetectable.\par He has DANIEL and uses a dental device; he previously responded for a few years after uvulectomy. [de-identified] : DLBCL, GCB type [FreeTextEntry1] : R-CHOP plus Onpro (2x cycles RCHOP21, then 4x cycles RCHOP14 due to interruption of therapy with lymphoma related SBO [de-identified] : Completed 6 cycles R-CHOP in March 2020\par Fatigue was resolved, but now feeling incr in fatigue\par overall feels well\par no constitutional c/o\par Denies recent fever, chills, night sweats\par Post herpetic pain - no longer needing duloxetine, resolved\par Has plantar neuropathy both feet still persists, bothers him at night\par had  right inguinal hernia repair  8/11/20, has persistent right groin, testicular discomfort, improved\par WBC, Hgb sl decr but stable

## 2021-01-14 NOTE — ED CLERICAL - NS ED CLERK UNITS
APER Complex Repair And Flap Additional Text (Will Appearing After The Standard Complex Repair Text): The complex repair was not sufficient to completely close the primary defect. The remaining additional defect was repaired with the flap mentioned below.

## 2021-01-21 ENCOUNTER — TRANSCRIPTION ENCOUNTER (OUTPATIENT)
Age: 71
End: 2021-01-21

## 2021-03-26 ENCOUNTER — OUTPATIENT (OUTPATIENT)
Dept: OUTPATIENT SERVICES | Facility: HOSPITAL | Age: 71
LOS: 1 days | Discharge: HOME | End: 2021-03-26

## 2021-03-26 DIAGNOSIS — Z90.49 ACQUIRED ABSENCE OF OTHER SPECIFIED PARTS OF DIGESTIVE TRACT: Chronic | ICD-10-CM

## 2021-03-26 DIAGNOSIS — C83.30 DIFFUSE LARGE B-CELL LYMPHOMA, UNSPECIFIED SITE: ICD-10-CM

## 2021-04-01 ENCOUNTER — RESULT REVIEW (OUTPATIENT)
Age: 71
End: 2021-04-01

## 2021-04-01 ENCOUNTER — APPOINTMENT (OUTPATIENT)
Dept: HEMATOLOGY ONCOLOGY | Facility: CLINIC | Age: 71
End: 2021-04-01
Payer: COMMERCIAL

## 2021-04-01 VITALS
TEMPERATURE: 97.4 F | RESPIRATION RATE: 14 BRPM | WEIGHT: 151.9 LBS | HEIGHT: 63.98 IN | HEART RATE: 63 BPM | SYSTOLIC BLOOD PRESSURE: 161 MMHG | OXYGEN SATURATION: 100 % | BODY MASS INDEX: 25.93 KG/M2 | DIASTOLIC BLOOD PRESSURE: 86 MMHG

## 2021-04-01 DIAGNOSIS — K40.90 UNILATERAL INGUINAL HERNIA, W/OUT OBSTRUCTION OR GANGRENE, NOT SPECIFIED AS RECURRENT: ICD-10-CM

## 2021-04-01 LAB
ALBUMIN SERPL ELPH-MCNC: 4.6 G/DL
ALP BLD-CCNC: 86 U/L
ALT SERPL-CCNC: 57 U/L
ANION GAP SERPL CALC-SCNC: 11 MMOL/L
AST SERPL-CCNC: 40 U/L
B2 MICROGLOB SERPL-MCNC: 1.9 MG/L
BILIRUB SERPL-MCNC: 0.2 MG/DL
BUN SERPL-MCNC: 23 MG/DL
CALCIUM SERPL-MCNC: 9.8 MG/DL
CHLORIDE SERPL-SCNC: 105 MMOL/L
CO2 SERPL-SCNC: 24 MMOL/L
CREAT SERPL-MCNC: 1.15 MG/DL
GLUCOSE SERPL-MCNC: 116 MG/DL
LDH SERPL-CCNC: 187 U/L
POTASSIUM SERPL-SCNC: 4.6 MMOL/L
PROT SERPL-MCNC: 6.8 G/DL
SODIUM SERPL-SCNC: 140 MMOL/L

## 2021-04-01 PROCEDURE — 99215 OFFICE O/P EST HI 40 MIN: CPT

## 2021-04-01 PROCEDURE — 99072 ADDL SUPL MATRL&STAF TM PHE: CPT

## 2021-04-01 NOTE — REVIEW OF SYSTEMS
[Fatigue] : fatigue [Negative] : Allergic/Immunologic [Confused] : no confusion [Dizziness] : no dizziness [Fainting] : no fainting [Difficulty Walking] : no difficulty walking [FreeTextEntry7] : as above [de-identified] : plantar neuropathy as above, better

## 2021-04-01 NOTE — ASSESSMENT
[Curative] : Goals of care discussed with patient: Curative [FreeTextEntry1] : DLBCL, GCB type non-DH. S/p R-CHOP x 6 with last 3 cycles given on a q14 d schedule post periop delay\par Fully recovered from SBO with SB resection.  \par PET/CT for 5/2020: Resolution of intraluminal small bowel foci (Deauville 1)\par CT C/A/P 10/31/20 NAD\par Post- herpetic neuralgia resolved\par PN mild, improving\par received 2nd Covid vaccine (Moderna) 3/17/21\par no longer needs f/u with Dr. NOLAN Cunha for hernia repair, doing well\par Mild anemia reversing: CBC reviewed today, WBC, plt nl, Hgb incr to 13\par Will repeat CT C/A/P at 6 mo carmela end of 4/21\par  \par Check CMP, LDH, beta 2 MG\par RV 3 mos\par \par \par

## 2021-04-01 NOTE — CONSULT LETTER
[Dear  ___] : Dear  [unfilled], [Courtesy Letter:] : I had the pleasure of seeing your patient, [unfilled], in my office today. [Please see my note below.] : Please see my note below. [Sincerely,] : Sincerely, [DrAmanda  ___] : Dr. FAUST [FreeTextEntry2] : Joe Mcfarlane M.D.\par [place] [FreeTextEntry3] : Adi Kingston M.D., [facility]\par

## 2021-04-01 NOTE — HISTORY OF PRESENT ILLNESS
[Disease:__________________________] : Disease: [unfilled] [Cycle: ___] : Cycle: [unfilled] [de-identified] : Mr. Hopson is a 70 yo WM with a small bowel mass and associated mild hydronephrosis.  He presented with anemia in routine labs i(Hgb 11.8) n May, 2019\par He was admitted to [facility] in 1/2018 with abdominal pain and was found to have an SBO.  CT scan showed SBO with transition in RLQ, no significant adenopathy, small amount of ascites. Sxs and SBO resolved with NGT decompression and has not recurred.  Cologard had been (-)  3/2018.  There was lab evidence of iron deficiency.  Colonoscopy 8/1/19 was aborted due to poor prep.  Repeat on August 14, 2019 showed erythema in rectum and diverticular dz, with rectal bxs showing focal active colitis.   He developed abd pain after the recent colonoscopy and repeat CT scan on 8/28/19 showed a new large apple core mass in lower abd/upper pelvis, 8.6 x 6.8 x 6.2 cm with marked nodular circumferential wall thickening.  Mild left hydroureteronephrosis was seen.  There was no LAD or H/S megaly.  Ascites was not noted. \par He continues to have intermittent post-prandial dull discomfort to the left of umbilicus and extending to LLQ.  He has had no melena, hematochezia or change in bowel habits.  He has had no constitutional symptoms other than unintended 8 pound weight loss in the last few weeks.\par He has a h/o ASHD and had a stent placed in the LAD  in 2013.   Prostate cancer was treated in 2008 with Cyberknife at [place] in 2008.\par He has had regular urologic f/u's and he reports that his PSA is undetectable.\par He has DANIEL and uses a dental device; he previously responded for a few years after uvulectomy. [de-identified] : DLBCL, GCB type [FreeTextEntry1] : R-CHOP plus Onpro (2x cycles RCHOP21, then 4x cycles RCHOP14 due to interruption of therapy with lymphoma related SBO [de-identified] : Completed 6 cycles R-CHOP in March 2020\par Fatigue less fatigue overall\par Peripheral neuropathy cont to improve in feet\par no constitutional c/o\par overall feels well\par no constitutional c/o\par Denies recent fever, chills, night sweats\par Post herpetic pain - no longer needing duloxetine, resolved\par Has plantar neuropathy both feet still persists, bothers him at night\par had  right inguinal hernia repair  8/11/20 - all pain resolved\par CT C/A/P 10/31/20 CLEVE\par

## 2021-04-01 NOTE — PHYSICAL EXAM
[Fully active, able to carry on all pre-disease performance without restriction] : Status 0 - Fully active, able to carry on all pre-disease performance without restriction [Normal] : affect appropriate [Ulcers] : no ulcers [Mucositis] : no mucositis [Thrush] : no thrush [de-identified] : no CVAT

## 2021-05-01 ENCOUNTER — RESULT REVIEW (OUTPATIENT)
Age: 71
End: 2021-05-01

## 2021-05-01 ENCOUNTER — APPOINTMENT (OUTPATIENT)
Dept: CT IMAGING | Facility: CLINIC | Age: 71
End: 2021-05-01
Payer: COMMERCIAL

## 2021-05-01 ENCOUNTER — OUTPATIENT (OUTPATIENT)
Dept: OUTPATIENT SERVICES | Facility: HOSPITAL | Age: 71
LOS: 1 days | End: 2021-05-01

## 2021-05-01 DIAGNOSIS — C83.30 DIFFUSE LARGE B-CELL LYMPHOMA, UNSPECIFIED SITE: ICD-10-CM

## 2021-05-01 DIAGNOSIS — Z90.49 ACQUIRED ABSENCE OF OTHER SPECIFIED PARTS OF DIGESTIVE TRACT: Chronic | ICD-10-CM

## 2021-05-01 PROCEDURE — 74177 CT ABD & PELVIS W/CONTRAST: CPT | Mod: 26

## 2021-05-01 PROCEDURE — 71260 CT THORAX DX C+: CPT | Mod: 26

## 2021-06-26 ENCOUNTER — OUTPATIENT (OUTPATIENT)
Dept: OUTPATIENT SERVICES | Facility: HOSPITAL | Age: 71
LOS: 1 days | Discharge: HOME | End: 2021-06-26

## 2021-06-26 DIAGNOSIS — Z90.49 ACQUIRED ABSENCE OF OTHER SPECIFIED PARTS OF DIGESTIVE TRACT: Chronic | ICD-10-CM

## 2021-06-26 DIAGNOSIS — C83.30 DIFFUSE LARGE B-CELL LYMPHOMA, UNSPECIFIED SITE: ICD-10-CM

## 2021-07-01 ENCOUNTER — RESULT REVIEW (OUTPATIENT)
Age: 71
End: 2021-07-01

## 2021-07-01 ENCOUNTER — APPOINTMENT (OUTPATIENT)
Dept: HEMATOLOGY ONCOLOGY | Facility: CLINIC | Age: 71
End: 2021-07-01
Payer: COMMERCIAL

## 2021-07-01 VITALS
HEIGHT: 63.98 IN | WEIGHT: 153.44 LBS | BODY MASS INDEX: 26.2 KG/M2 | DIASTOLIC BLOOD PRESSURE: 82 MMHG | SYSTOLIC BLOOD PRESSURE: 155 MMHG | RESPIRATION RATE: 16 BRPM | OXYGEN SATURATION: 98 % | HEART RATE: 59 BPM | TEMPERATURE: 97.8 F

## 2021-07-01 LAB
BASOPHILS # BLD AUTO: 0.03 K/UL — SIGNIFICANT CHANGE UP (ref 0–0.2)
BASOPHILS NFR BLD AUTO: 0.7 % — SIGNIFICANT CHANGE UP (ref 0–2)
EOSINOPHIL # BLD AUTO: 0.17 K/UL — SIGNIFICANT CHANGE UP (ref 0–0.5)
EOSINOPHIL NFR BLD AUTO: 3.8 % — SIGNIFICANT CHANGE UP (ref 0–6)
HCT VFR BLD CALC: 37.9 % — LOW (ref 39–50)
HGB BLD-MCNC: 12.3 G/DL — LOW (ref 13–17)
IMM GRANULOCYTES NFR BLD AUTO: 0.5 % — SIGNIFICANT CHANGE UP (ref 0–1.5)
LYMPHOCYTES # BLD AUTO: 0.66 K/UL — LOW (ref 1–3.3)
LYMPHOCYTES # BLD AUTO: 14.9 % — SIGNIFICANT CHANGE UP (ref 13–44)
MCHC RBC-ENTMCNC: 28.5 PG — SIGNIFICANT CHANGE UP (ref 27–34)
MCHC RBC-ENTMCNC: 32.5 G/DL — SIGNIFICANT CHANGE UP (ref 32–36)
MCV RBC AUTO: 87.9 FL — SIGNIFICANT CHANGE UP (ref 80–100)
MONOCYTES # BLD AUTO: 0.58 K/UL — SIGNIFICANT CHANGE UP (ref 0–0.9)
MONOCYTES NFR BLD AUTO: 13.1 % — SIGNIFICANT CHANGE UP (ref 2–14)
NEUTROPHILS # BLD AUTO: 2.98 K/UL — SIGNIFICANT CHANGE UP (ref 1.8–7.4)
NEUTROPHILS NFR BLD AUTO: 67 % — SIGNIFICANT CHANGE UP (ref 43–77)
NRBC # BLD: 0 /100 WBCS — SIGNIFICANT CHANGE UP (ref 0–0)
PLATELET # BLD AUTO: 189 K/UL — SIGNIFICANT CHANGE UP (ref 150–400)
RBC # BLD: 4.31 M/UL — SIGNIFICANT CHANGE UP (ref 4.2–5.8)
RBC # FLD: 13.8 % — SIGNIFICANT CHANGE UP (ref 10.3–14.5)
WBC # BLD: 4.44 K/UL — SIGNIFICANT CHANGE UP (ref 3.8–10.5)
WBC # FLD AUTO: 4.44 K/UL — SIGNIFICANT CHANGE UP (ref 3.8–10.5)

## 2021-07-01 PROCEDURE — 99072 ADDL SUPL MATRL&STAF TM PHE: CPT

## 2021-07-01 PROCEDURE — 99214 OFFICE O/P EST MOD 30 MIN: CPT

## 2021-07-01 NOTE — CONSULT LETTER
[Dear  ___] : Dear  [unfilled], [Courtesy Letter:] : I had the pleasure of seeing your patient, [unfilled], in my office today. [Please see my note below.] : Please see my note below. [Sincerely,] : Sincerely, [DrAmanda  ___] : Dr. FAUST [FreeTextEntry2] : Joe Mcfarlane M.D.\par [place] [FreeTextEntry3] : Adi Kingston M.D., [facility]\par

## 2021-07-01 NOTE — HISTORY OF PRESENT ILLNESS
[Disease:__________________________] : Disease: [unfilled] [Cycle: ___] : Cycle: [unfilled] [de-identified] : Mr. Hopson is a 68 yo WM with a small bowel mass and associated mild hydronephrosis.  He presented with anemia in routine labs i(Hgb 11.8) n May, 2019\par He was admitted to [facility] in 1/2018 with abdominal pain and was found to have an SBO.  CT scan showed SBO with transition in RLQ, no significant adenopathy, small amount of ascites. Sxs and SBO resolved with NGT decompression and has not recurred.  Cologard had been (-)  3/2018.  There was lab evidence of iron deficiency.  Colonoscopy 8/1/19 was aborted due to poor prep.  Repeat on August 14, 2019 showed erythema in rectum and diverticular dz, with rectal bxs showing focal active colitis.   He developed abd pain after the recent colonoscopy and repeat CT scan on 8/28/19 showed a new large apple core mass in lower abd/upper pelvis, 8.6 x 6.8 x 6.2 cm with marked nodular circumferential wall thickening.  Mild left hydroureteronephrosis was seen.  There was no LAD or H/S megaly.  Ascites was not noted. \par He continues to have intermittent post-prandial dull discomfort to the left of umbilicus and extending to LLQ.  He has had no melena, hematochezia or change in bowel habits.  He has had no constitutional symptoms other than unintended 8 pound weight loss in the last few weeks.\par He has a h/o ASHD and had a stent placed in the LAD  in 2013.   Prostate cancer was treated in 2008 with Cyberknife at [place] in 2008.\par He has had regular urologic f/u's and he reports that his PSA is undetectable.\par He has DANIEL and uses a dental device; he previously responded for a few years after uvulectomy. [de-identified] : DLBCL, GCB type [FreeTextEntry1] : R-CHOP plus Onpro (2x cycles RCHOP21, then 4x cycles RCHOP14 due to interruption of therapy with lymphoma related SBO [de-identified] : Completed 6 cycles R-CHOP in March 2020\par Fatigue less \par Peripheral neuropathy had improved but now fluctuating\par plantar neuropathy both feet still persists, bothers him at night, no problem when walking\par no constitutional c/o\par overall feels well\par no constitutional c/o\par Denies recent fever, chills, night sweats\par Post herpetic pain -resolved, off cymbalta\par had  right inguinal hernia repair  8/11/20 - all pain resolved\par CT C/A/P  5/1/21  CLEVE\par

## 2021-07-01 NOTE — PHYSICAL EXAM
[Fully active, able to carry on all pre-disease performance without restriction] : Status 0 - Fully active, able to carry on all pre-disease performance without restriction [Normal] : affect appropriate [Ulcers] : no ulcers [Mucositis] : no mucositis [Thrush] : no thrush [de-identified] : no CVAT

## 2021-07-01 NOTE — ASSESSMENT
[Curative] : Goals of care discussed with patient: Curative [FreeTextEntry1] : DLBCL, GCB type non-DH. S/p R-CHOP x 6 with last 3 cycles given on a q14 d schedule post periop delay\par Fully recovered from SBO with SB resection.  \par PET/CT for 5/2020: Resolution of intraluminal small bowel foci (Deauville 1)\par CT C/A/P 10/31/20 and 5/1/21 CLEVE\par Post- herpetic neuralgia resolved\par PN mild, but persistent\par \par received 2nd Covid vaccine (Moderna) 3/17/21\par CBC results reviewed and d/w pt\par WBC 4.44, Hgb 12.3, Plt 189K, ANC 2.98\par  \par Check CMP, LDH, beta 2 MG\par Repeat CT C/A/P 11/21\par RV 3 mos\par \par \par

## 2021-07-01 NOTE — REVIEW OF SYSTEMS
[Fatigue] : fatigue [Negative] : Allergic/Immunologic [Confused] : no confusion [Dizziness] : no dizziness [Fainting] : no fainting [Difficulty Walking] : no difficulty walking [FreeTextEntry7] : as above [de-identified] : plantar neuropathy as above, better

## 2021-08-24 NOTE — BRIEF OPERATIVE NOTE - URINE OUTPUT
Detail Level: Detailed Add 91547 Cpt? (Important Note: In 2017 The Use Of 13658 Is Being Tracked By Cms To Determine Future Global Period Reimbursement For Global Periods): no 350

## 2021-10-11 ENCOUNTER — OUTPATIENT (OUTPATIENT)
Dept: OUTPATIENT SERVICES | Facility: HOSPITAL | Age: 71
LOS: 1 days | Discharge: HOME | End: 2021-10-11

## 2021-10-11 DIAGNOSIS — C83.30 DIFFUSE LARGE B-CELL LYMPHOMA, UNSPECIFIED SITE: ICD-10-CM

## 2021-10-11 DIAGNOSIS — Z90.49 ACQUIRED ABSENCE OF OTHER SPECIFIED PARTS OF DIGESTIVE TRACT: Chronic | ICD-10-CM

## 2021-10-14 ENCOUNTER — LABORATORY RESULT (OUTPATIENT)
Age: 71
End: 2021-10-14

## 2021-10-14 ENCOUNTER — RESULT REVIEW (OUTPATIENT)
Age: 71
End: 2021-10-14

## 2021-10-14 ENCOUNTER — APPOINTMENT (OUTPATIENT)
Dept: HEMATOLOGY ONCOLOGY | Facility: CLINIC | Age: 71
End: 2021-10-14
Payer: COMMERCIAL

## 2021-10-14 VITALS
OXYGEN SATURATION: 99 % | WEIGHT: 157.63 LBS | TEMPERATURE: 97.3 F | HEIGHT: 63.98 IN | SYSTOLIC BLOOD PRESSURE: 144 MMHG | BODY MASS INDEX: 26.91 KG/M2 | HEART RATE: 75 BPM | RESPIRATION RATE: 17 BRPM | DIASTOLIC BLOOD PRESSURE: 73 MMHG

## 2021-10-14 DIAGNOSIS — Z87.19 PERSONAL HISTORY OF OTHER DISEASES OF THE DIGESTIVE SYSTEM: ICD-10-CM

## 2021-10-14 LAB
ALBUMIN SERPL ELPH-MCNC: 4.5 G/DL
ALBUMIN SERPL ELPH-MCNC: 4.6 G/DL
ALBUMIN SERPL ELPH-MCNC: 4.7 G/DL
ALBUMIN SERPL ELPH-MCNC: 4.8 G/DL
ALBUMIN SERPL ELPH-MCNC: 5.1 G/DL
ALP BLD-CCNC: 100 U/L
ALP BLD-CCNC: 103 U/L
ALP BLD-CCNC: 106 U/L
ALP BLD-CCNC: 80 U/L
ALP BLD-CCNC: 83 U/L
ALT SERPL-CCNC: 30 U/L
ALT SERPL-CCNC: 37 U/L
ALT SERPL-CCNC: 42 U/L
ALT SERPL-CCNC: 45 U/L
ALT SERPL-CCNC: 55 U/L
ANION GAP SERPL CALC-SCNC: 11 MMOL/L
ANION GAP SERPL CALC-SCNC: 13 MMOL/L
ANION GAP SERPL CALC-SCNC: 14 MMOL/L
ANION GAP SERPL CALC-SCNC: 15 MMOL/L
ANION GAP SERPL CALC-SCNC: 16 MMOL/L
AST SERPL-CCNC: 23 U/L
AST SERPL-CCNC: 25 U/L
AST SERPL-CCNC: 30 U/L
AST SERPL-CCNC: 31 U/L
AST SERPL-CCNC: 37 U/L
B2 MICROGLOB SERPL-MCNC: 1.8 MG/L
B2 MICROGLOB SERPL-MCNC: 1.9 MG/L
B2 MICROGLOB SERPL-MCNC: 2 MG/L
B2 MICROGLOB SERPL-MCNC: 2.1 MG/L
B2 MICROGLOB SERPL-MCNC: 2.2 MG/L
BASOPHILS # BLD AUTO: 0.05 K/UL — SIGNIFICANT CHANGE UP (ref 0–0.2)
BASOPHILS NFR BLD AUTO: 1.1 % — SIGNIFICANT CHANGE UP (ref 0–2)
BILIRUB SERPL-MCNC: 0.2 MG/DL
BILIRUB SERPL-MCNC: 0.3 MG/DL
BILIRUB SERPL-MCNC: 0.4 MG/DL
BUN SERPL-MCNC: 18 MG/DL
BUN SERPL-MCNC: 18 MG/DL
BUN SERPL-MCNC: 19 MG/DL
BUN SERPL-MCNC: 21 MG/DL
BUN SERPL-MCNC: 23 MG/DL
CALCIUM SERPL-MCNC: 10 MG/DL
CALCIUM SERPL-MCNC: 10 MG/DL
CALCIUM SERPL-MCNC: 8.8 MG/DL
CALCIUM SERPL-MCNC: 9.6 MG/DL
CALCIUM SERPL-MCNC: 9.6 MG/DL
CHLORIDE SERPL-SCNC: 101 MMOL/L
CHLORIDE SERPL-SCNC: 101 MMOL/L
CHLORIDE SERPL-SCNC: 104 MMOL/L
CHLORIDE SERPL-SCNC: 104 MMOL/L
CHLORIDE SERPL-SCNC: 106 MMOL/L
CO2 SERPL-SCNC: 21 MMOL/L
CO2 SERPL-SCNC: 24 MMOL/L
CO2 SERPL-SCNC: 25 MMOL/L
CREAT SERPL-MCNC: 1.09 MG/DL
CREAT SERPL-MCNC: 1.09 MG/DL
CREAT SERPL-MCNC: 1.11 MG/DL
CREAT SERPL-MCNC: 1.11 MG/DL
CREAT SERPL-MCNC: 1.21 MG/DL
DEPRECATED KAPPA LC FREE/LAMBDA SER: 1.96 RATIO
EOSINOPHIL # BLD AUTO: 0.32 K/UL — SIGNIFICANT CHANGE UP (ref 0–0.5)
EOSINOPHIL NFR BLD AUTO: 7 % — HIGH (ref 0–6)
GLUCOSE SERPL-MCNC: 112 MG/DL
GLUCOSE SERPL-MCNC: 116 MG/DL
GLUCOSE SERPL-MCNC: 117 MG/DL
GLUCOSE SERPL-MCNC: 119 MG/DL
GLUCOSE SERPL-MCNC: 125 MG/DL
HCT VFR BLD CALC: 39.7 % — SIGNIFICANT CHANGE UP (ref 39–50)
HGB BLD-MCNC: 12.8 G/DL — LOW (ref 13–17)
IGA SER QL IEP: 81 MG/DL
IGG SER QL IEP: 676 MG/DL
IGM SER QL IEP: 27 MG/DL
IMM GRANULOCYTES NFR BLD AUTO: 0.7 % — SIGNIFICANT CHANGE UP (ref 0–1.5)
KAPPA LC CSF-MCNC: 0.78 MG/DL
KAPPA LC SERPL-MCNC: 1.53 MG/DL
LDH SERPL-CCNC: 157 U/L
LDH SERPL-CCNC: 163 U/L
LDH SERPL-CCNC: 185 U/L
LDH SERPL-CCNC: 188 U/L
LDH SERPL-CCNC: 191 U/L
LYMPHOCYTES # BLD AUTO: 0.59 K/UL — LOW (ref 1–3.3)
LYMPHOCYTES # BLD AUTO: 12.9 % — LOW (ref 13–44)
MCHC RBC-ENTMCNC: 28.5 PG — SIGNIFICANT CHANGE UP (ref 27–34)
MCHC RBC-ENTMCNC: 32.2 G/DL — SIGNIFICANT CHANGE UP (ref 32–36)
MCV RBC AUTO: 88.4 FL — SIGNIFICANT CHANGE UP (ref 80–100)
MONOCYTES # BLD AUTO: 0.61 K/UL — SIGNIFICANT CHANGE UP (ref 0–0.9)
MONOCYTES NFR BLD AUTO: 13.4 % — SIGNIFICANT CHANGE UP (ref 2–14)
NEUTROPHILS # BLD AUTO: 2.96 K/UL — SIGNIFICANT CHANGE UP (ref 1.8–7.4)
NEUTROPHILS NFR BLD AUTO: 64.9 % — SIGNIFICANT CHANGE UP (ref 43–77)
NRBC # BLD: 0 /100 WBCS — SIGNIFICANT CHANGE UP (ref 0–0)
PLATELET # BLD AUTO: 190 K/UL — SIGNIFICANT CHANGE UP (ref 150–400)
POTASSIUM SERPL-SCNC: 4.3 MMOL/L
POTASSIUM SERPL-SCNC: 4.6 MMOL/L
POTASSIUM SERPL-SCNC: 4.7 MMOL/L
POTASSIUM SERPL-SCNC: 4.9 MMOL/L
POTASSIUM SERPL-SCNC: 5.1 MMOL/L
PROT SERPL-MCNC: 6.3 G/DL
PROT SERPL-MCNC: 6.6 G/DL
PROT SERPL-MCNC: 6.8 G/DL
PROT SERPL-MCNC: 6.9 G/DL
PROT SERPL-MCNC: 6.9 G/DL
RBC # BLD: 4.49 M/UL — SIGNIFICANT CHANGE UP (ref 4.2–5.8)
RBC # FLD: 13.9 % — SIGNIFICANT CHANGE UP (ref 10.3–14.5)
SODIUM SERPL-SCNC: 139 MMOL/L
SODIUM SERPL-SCNC: 139 MMOL/L
SODIUM SERPL-SCNC: 142 MMOL/L
SODIUM SERPL-SCNC: 143 MMOL/L
SODIUM SERPL-SCNC: 143 MMOL/L
WBC # BLD: 4.56 K/UL — SIGNIFICANT CHANGE UP (ref 3.8–10.5)
WBC # FLD AUTO: 4.56 K/UL — SIGNIFICANT CHANGE UP (ref 3.8–10.5)

## 2021-10-14 PROCEDURE — 99214 OFFICE O/P EST MOD 30 MIN: CPT

## 2021-10-14 RX ORDER — AMLODIPINE BESYLATE 5 MG/1
5 TABLET ORAL DAILY
Refills: 0 | Status: ACTIVE | COMMUNITY
Start: 2021-10-14

## 2021-10-14 NOTE — PHYSICAL EXAM
[Fully active, able to carry on all pre-disease performance without restriction] : Status 0 - Fully active, able to carry on all pre-disease performance without restriction [Normal] : affect appropriate [Ulcers] : no ulcers [Mucositis] : no mucositis [Thrush] : no thrush [de-identified] : no CVAT

## 2021-10-14 NOTE — ASSESSMENT
[Curative] : Goals of care discussed with patient: Curative [FreeTextEntry1] : DLBCL, GBC type non-DH. S/p R-CHOP x 6 with last 3 cycles given on a q14 d schedule post periop delay\par due to  SBO with SB resection.  \par PET/CT for 5/2020: Resolution of intraluminal small bowel foci (Deauville 1)\par CT C/A/P 10/31/20 and 5/1/21 CLEVE\par Post- herpetic neuralgia resolved\par PN mild, but persistent\par \par received 2nd Covid vaccine (Moderna) 3/17/21\par CBC results reviewed and d/w pt\par WBC 4.56, Hgb 12.8, Plt 190K,  diff nl except for persistent lymphocytopenia\par  \par Check CMP, LDH, beta 2 MG\par Repeat CT C/A/P 11/21\par RV 3 mos\par \par \par

## 2021-10-14 NOTE — HISTORY OF PRESENT ILLNESS
[Disease:__________________________] : Disease: [unfilled] [Cycle: ___] : Cycle: [unfilled] [de-identified] : Mr. Hopson is a 70 yo WM with a small bowel mass and associated mild hydronephrosis.  He presented with anemia in routine labs i(Hgb 11.8) n May, 2019\par He was admitted to [facility] in 1/2018 with abdominal pain and was found to have an SBO.  CT scan showed SBO with transition in RLQ, no significant adenopathy, small amount of ascites. Sxs and SBO resolved with NGT decompression and has not recurred.  Cologard had been (-)  3/2018.  There was lab evidence of iron deficiency.  Colonoscopy 8/1/19 was aborted due to poor prep.  Repeat on August 14, 2019 showed erythema in rectum and diverticular dz, with rectal bxs showing focal active colitis.   He developed abd pain after the recent colonoscopy and repeat CT scan on 8/28/19 showed a new large apple core mass in lower abd/upper pelvis, 8.6 x 6.8 x 6.2 cm with marked nodular circumferential wall thickening.  Mild left hydroureteronephrosis was seen.  There was no LAD or H/S megaly.  Ascites was not noted. \par He continues to have intermittent post-prandial dull discomfort to the left of umbilicus and extending to LLQ.  He has had no melena, hematochezia or change in bowel habits.  He has had no constitutional symptoms other than unintended 8 pound weight loss in the last few weeks.\par He has a h/o ASHD and had a stent placed in the LAD  in 2013.   Prostate cancer was treated in 2008 with Cyberknife at [place] in 2008.\par He has had regular urologic f/u's and he reports that his PSA is undetectable.\par He has DANIEL and uses a dental device; he previously responded for a few years after uvulectomy. [de-identified] : DLBCL, GCB type [FreeTextEntry1] : R-CHOP plus Onpro (2x cycles RCHOP21, then 4x cycles RCHOP14 due to interruption of therapy with lymphoma related SBO [de-identified] : Completed 6 cycles R-CHOP in March 2020\par Fatigue less \par Peripheral neuropathy improved but now fluctuating-better than 6 mos ago\par plantar neuropathy both feet still persists, bothers him at night, no issue with gait\par no constitutional c/o\par overall feels well\par had recent episode vertigo\par Post herpetic pain -resolved, off cymbalta\par had  right inguinal hernia repair  8/11/20 \par CT C/A/P  5/1/21  CLEVE\par

## 2021-10-14 NOTE — REVIEW OF SYSTEMS
[Fatigue] : fatigue [Negative] : Allergic/Immunologic [Confused] : no confusion [Dizziness] : no dizziness [Fainting] : no fainting [Difficulty Walking] : no difficulty walking [FreeTextEntry7] : GERD controlled with PPI [de-identified] : plantar neuropathy as above, better

## 2021-10-14 NOTE — CONSULT LETTER
[Dear  ___] : Dear  [unfilled], [Courtesy Letter:] : I had the pleasure of seeing your patient, [unfilled], in my office today. [Please see my note below.] : Please see my note below. [Sincerely,] : Sincerely, [DrAmanda  ___] : Dr. FAUST [FreeTextEntry2] : Joe Mcfarlane M.D.\par [place] [FreeTextEntry3] : Adi Kingston M.D., [facility]\par

## 2021-11-05 ENCOUNTER — RESULT REVIEW (OUTPATIENT)
Age: 71
End: 2021-11-05

## 2021-11-09 ENCOUNTER — OUTPATIENT (OUTPATIENT)
Dept: OUTPATIENT SERVICES | Facility: HOSPITAL | Age: 71
LOS: 1 days | End: 2021-11-09

## 2021-11-09 ENCOUNTER — APPOINTMENT (OUTPATIENT)
Dept: CT IMAGING | Facility: CLINIC | Age: 71
End: 2021-11-09
Payer: COMMERCIAL

## 2021-11-09 DIAGNOSIS — C83.30 DIFFUSE LARGE B-CELL LYMPHOMA, UNSPECIFIED SITE: ICD-10-CM

## 2021-11-09 DIAGNOSIS — Z90.49 ACQUIRED ABSENCE OF OTHER SPECIFIED PARTS OF DIGESTIVE TRACT: Chronic | ICD-10-CM

## 2021-11-09 PROCEDURE — 74177 CT ABD & PELVIS W/CONTRAST: CPT | Mod: 26

## 2021-11-09 PROCEDURE — 71260 CT THORAX DX C+: CPT | Mod: 26

## 2022-03-02 ENCOUNTER — OUTPATIENT (OUTPATIENT)
Dept: OUTPATIENT SERVICES | Facility: HOSPITAL | Age: 72
LOS: 1 days | Discharge: HOME | End: 2022-03-02

## 2022-03-02 DIAGNOSIS — C83.30 DIFFUSE LARGE B-CELL LYMPHOMA, UNSPECIFIED SITE: ICD-10-CM

## 2022-03-02 DIAGNOSIS — Z90.49 ACQUIRED ABSENCE OF OTHER SPECIFIED PARTS OF DIGESTIVE TRACT: Chronic | ICD-10-CM

## 2022-03-03 ENCOUNTER — RESULT REVIEW (OUTPATIENT)
Age: 72
End: 2022-03-03

## 2022-03-03 ENCOUNTER — APPOINTMENT (OUTPATIENT)
Dept: HEMATOLOGY ONCOLOGY | Facility: CLINIC | Age: 72
End: 2022-03-03
Payer: MEDICARE

## 2022-03-03 VITALS
HEART RATE: 51 BPM | OXYGEN SATURATION: 98 % | TEMPERATURE: 97 F | HEIGHT: 63.78 IN | SYSTOLIC BLOOD PRESSURE: 120 MMHG | BODY MASS INDEX: 27.63 KG/M2 | RESPIRATION RATE: 14 BRPM | WEIGHT: 159.83 LBS | DIASTOLIC BLOOD PRESSURE: 80 MMHG

## 2022-03-03 LAB
BASOPHILS # BLD AUTO: 0.03 K/UL — SIGNIFICANT CHANGE UP (ref 0–0.2)
BASOPHILS NFR BLD AUTO: 0.7 % — SIGNIFICANT CHANGE UP (ref 0–2)
EOSINOPHIL # BLD AUTO: 0.2 K/UL — SIGNIFICANT CHANGE UP (ref 0–0.5)
EOSINOPHIL NFR BLD AUTO: 4.7 % — SIGNIFICANT CHANGE UP (ref 0–6)
HCT VFR BLD CALC: 39.1 % — SIGNIFICANT CHANGE UP (ref 39–50)
HGB BLD-MCNC: 12.5 G/DL — LOW (ref 13–17)
IMM GRANULOCYTES NFR BLD AUTO: 0.5 % — SIGNIFICANT CHANGE UP (ref 0–1.5)
LYMPHOCYTES # BLD AUTO: 0.77 K/UL — LOW (ref 1–3.3)
LYMPHOCYTES # BLD AUTO: 18 % — SIGNIFICANT CHANGE UP (ref 13–44)
MCHC RBC-ENTMCNC: 28 PG — SIGNIFICANT CHANGE UP (ref 27–34)
MCHC RBC-ENTMCNC: 32 G/DL — SIGNIFICANT CHANGE UP (ref 32–36)
MCV RBC AUTO: 87.5 FL — SIGNIFICANT CHANGE UP (ref 80–100)
MONOCYTES # BLD AUTO: 0.45 K/UL — SIGNIFICANT CHANGE UP (ref 0–0.9)
MONOCYTES NFR BLD AUTO: 10.5 % — SIGNIFICANT CHANGE UP (ref 2–14)
NEUTROPHILS # BLD AUTO: 2.81 K/UL — SIGNIFICANT CHANGE UP (ref 1.8–7.4)
NEUTROPHILS NFR BLD AUTO: 65.6 % — SIGNIFICANT CHANGE UP (ref 43–77)
NRBC # BLD: 0 /100 WBCS — SIGNIFICANT CHANGE UP (ref 0–0)
PLATELET # BLD AUTO: 207 K/UL — SIGNIFICANT CHANGE UP (ref 150–400)
RBC # BLD: 4.47 M/UL — SIGNIFICANT CHANGE UP (ref 4.2–5.8)
RBC # FLD: 14.1 % — SIGNIFICANT CHANGE UP (ref 10.3–14.5)
WBC # BLD: 4.28 K/UL — SIGNIFICANT CHANGE UP (ref 3.8–10.5)
WBC # FLD AUTO: 4.28 K/UL — SIGNIFICANT CHANGE UP (ref 3.8–10.5)

## 2022-03-03 PROCEDURE — 99214 OFFICE O/P EST MOD 30 MIN: CPT

## 2022-03-03 NOTE — HISTORY OF PRESENT ILLNESS
[Disease:__________________________] : Disease: [unfilled] [Cycle: ___] : Cycle: [unfilled] [de-identified] : Mr. Hopson is a 70 yo WM with a small bowel mass and associated mild hydronephrosis.  He presented with anemia in routine labs i(Hgb 11.8) n May, 2019\par He was admitted to [facility] in 1/2018 with abdominal pain and was found to have an SBO.  CT scan showed SBO with transition in RLQ, no significant adenopathy, small amount of ascites. Sxs and SBO resolved with NGT decompression and has not recurred.  Cologard had been (-)  3/2018.  There was lab evidence of iron deficiency.  Colonoscopy 8/1/19 was aborted due to poor prep.  Repeat on August 14, 2019 showed erythema in rectum and diverticular dz, with rectal bxs showing focal active colitis.   He developed abd pain after the recent colonoscopy and repeat CT scan on 8/28/19 showed a new large apple core mass in lower abd/upper pelvis, 8.6 x 6.8 x 6.2 cm with marked nodular circumferential wall thickening.  Mild left hydroureteronephrosis was seen.  There was no LAD or H/S megaly.  Ascites was not noted. \par He continues to have intermittent post-prandial dull discomfort to the left of umbilicus and extending to LLQ.  He has had no melena, hematochezia or change in bowel habits.  He has had no constitutional symptoms other than unintended 8 pound weight loss in the last few weeks.\par He has a h/o ASHD and had a stent placed in the LAD  in 2013.   Prostate cancer was treated in 2008 with Cyberknife at [place] in 2008.\par He has had regular urologic f/u's and he reports that his PSA is undetectable.\par He has DANIEL and uses a dental device; he previously responded for a few years after uvulectomy. 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 \par plantar neuropathy both feet still persists, bothers him at night, no issue with gait\par Post herpetic pain -resolved, off cymbalta\par had  right inguinal hernia repair  8/11/20 \par no constitutional c/o\par overall feels well; retired in december \par \par CT C/A/P  11/21  CLEVE\par

## 2022-03-03 NOTE — PHYSICAL EXAM
[Fully active, able to carry on all pre-disease performance without restriction] : Status 0 - Fully active, able to carry on all pre-disease performance without restriction [Normal] : affect appropriate [Ulcers] : no ulcers [Mucositis] : no mucositis [Thrush] : no thrush [de-identified] : no CVAT

## 2022-03-03 NOTE — REVIEW OF SYSTEMS
[Fatigue] : fatigue [Negative] : Allergic/Immunologic [Confused] : no confusion [Dizziness] : no dizziness [Fainting] : no fainting [Difficulty Walking] : no difficulty walking [FreeTextEntry7] : GERD controlled with PPI [de-identified] : plantar neuropathy as above, better

## 2022-03-03 NOTE — CONSULT LETTER
[Dear  ___] : Dear  [unfilled], [Courtesy Letter:] : I had the pleasure of seeing your patient, [unfilled], in my office today. [Please see my note below.] : Please see my note below. [Sincerely,] : Sincerely, [DrAmanda  ___] : Dr. FAUST [FreeTextEntry2] : Joe Mcfarlane M.D.\par [place] [FreeTextEntry3] : Adi Kingston M.D., [facility]\par

## 2022-03-03 NOTE — END OF VISIT
[] : Fellow [FreeTextEntry3] : agree with above assessment and plan\par can d/c regular imaging now\par lymphocytopenia still likely related to rituxan\par check Igs, covid spike antigen ab; B12 (Hgb 12.5, iron studies nl)

## 2022-03-03 NOTE — ASSESSMENT
[Curative] : Goals of care discussed with patient: Curative [FreeTextEntry1] : DLBCL, GBC type non-DH. S/p R-CHOP x 6 with last 3 cycles given on a q14 d schedule post periop delay\par due to  SBO with SB resection.  \par PET/CT for 5/2020: Resolution of intraluminal small bowel foci (Deauville 1)\par CT C/A/P 10/31/20,  5/1/21, 11/9/21 CLEVE\par \par Post- herpetic neuralgia resolved\par PN mild, but persistent only at night\par Vaccinated - last dose 3/17 (moderna) - will check covid 19 spike \par \par CBC results reviewed and d/w pt\par WBC 4.28 Hgb 12.5, Plt  207,  diff nl except for persistent lymphocytopenia\par \par Persistent normocytic anemia; colonoscopy in 2019 normal, Iron studies in October normal, will check B12. \par \par Check CMP, LDH, beta 2 MG; Will also check quantitative immunoglobulins\par \par RV 6 mos\par \par Seen and discussed with Dr Kingston \par

## 2022-03-04 LAB
ALBUMIN SERPL ELPH-MCNC: 4.7 G/DL
ALP BLD-CCNC: 80 U/L
ALT SERPL-CCNC: 40 U/L
ANION GAP SERPL CALC-SCNC: 12 MMOL/L
AST SERPL-CCNC: 26 U/L
B2 MICROGLOB SERPL-MCNC: 1.9 MG/L
BILIRUB SERPL-MCNC: 0.3 MG/DL
BUN SERPL-MCNC: 23 MG/DL
CALCIUM SERPL-MCNC: 9.7 MG/DL
CHLORIDE SERPL-SCNC: 105 MMOL/L
CO2 SERPL-SCNC: 25 MMOL/L
CREAT SERPL-MCNC: 1.13 MG/DL
EGFR: 69 ML/MIN/1.73M2
GLUCOSE SERPL-MCNC: 117 MG/DL
LDH SERPL-CCNC: 160 U/L
POTASSIUM SERPL-SCNC: 5 MMOL/L
PROT SERPL-MCNC: 6.4 G/DL
SODIUM SERPL-SCNC: 142 MMOL/L

## 2022-06-06 NOTE — REASON FOR VISIT
"Patient Assessment Instrument  Quality Indicators - Admission    Section B. Hearing, Speech Vision      Section C. Cognitive Patterns  Brief Interview for Mental Status (BIMS) was conducted.  Repetition of Three Words: Three words  Able to report correct year: Correct  Able to report correct month: Accurate within 5 days  Able to report correct day of the week: Correct  Able to recall \"sock\": Yes, no cue required  Able to recall \"blue\": Yes, no cue required  Able to recall \"bed\": Yes, no cue required    BIMS SUMMARY SCORE: 15 Cognitively intact Patient was able to complete the Brief  Interview for Mental Status    Section [id number]. Prior Functioning    Self Care: Patient completed the activities by him/herself, with or without an  assistive device, with no assistance from a helper.  Indoor Mobility: Patient completed the activities by him/herself, with or  without an assistive device, with no assistance from a helper.  Stairs: Patient completed the activities by him/herself, with or without an  assistive device, with no assistance from a helper.  Functional Cognition: Patient completed the activities by him/herself, with or  without an assistive device, with no assistance from a helper.    Section AG3102. Prior Device Use      Section [id number]. Self Care Performance      Section [id number]. Self Care Discharge Goals      Section [id number]. Mobility Performance      Section [id number]. Mobility Discharge Goals      Section H. Bladder and Bowel  Bladder Continence: Not applicable (e.g., indwelling catheter).  Bowel Continence: Always continent (no documented incontinence).    Section I. Active Diagnosis  Comorbidities and Co-existing Conditions:   Diabetes Mellitus (DM) - e.g.,  diabetic retinopathy, nephropathy, and neuropathy).    Section J. Health Conditions  Patient has not had any falls in the past year. Patient has had major surgery  during the 100 days prior to admission.    Section K. Swallowing/Nutritional Status  Regular " food (solids and liquids swallowed safely without supervision or  modified food or liquid consistency).    Section M. Skin Conditions  Unhealed Pressure Ulcer/Injuries at Stage 1 or Higher on Admission:  No.    Section N. Medication    Potential Clinically Significant Medication Issues: No issues found during  review    Section O. Special Treatments, Procedures, and Programs  Patient did not receive total parenteral nutrition treatment at the time of  admission.    OPTIONAL BRANCH FOR TRACKING FALLS  Fall(s) During Shift: No falls.    Signed by: Jaja Ramirez, Supervisor     [Follow-Up Visit] : a follow-up visit for [Lymphoma] : lymphoma [Spouse] : spouse [FreeTextEntry2] : DLBCL

## 2022-06-29 ENCOUNTER — TRANSCRIPTION ENCOUNTER (OUTPATIENT)
Age: 72
End: 2022-06-29

## 2022-09-01 ENCOUNTER — OUTPATIENT (OUTPATIENT)
Dept: OUTPATIENT SERVICES | Facility: HOSPITAL | Age: 72
LOS: 1 days | Discharge: HOME | End: 2022-09-01

## 2022-09-01 DIAGNOSIS — Z90.49 ACQUIRED ABSENCE OF OTHER SPECIFIED PARTS OF DIGESTIVE TRACT: Chronic | ICD-10-CM

## 2022-09-01 DIAGNOSIS — C83.30 DIFFUSE LARGE B-CELL LYMPHOMA, UNSPECIFIED SITE: ICD-10-CM

## 2022-09-03 LAB
COVID-19 SPIKE DOMAIN ANTIBODY INTERPRETATION: POSITIVE
IGA SER QL IEP: 104 MG/DL
IGG SER QL IEP: 637 MG/DL
IGM SER QL IEP: 28 MG/DL
SARS-COV-2 AB SERPL IA-ACNC: >250 U/ML
VIT B12 SERPL-MCNC: 736 PG/ML

## 2022-09-06 ENCOUNTER — APPOINTMENT (OUTPATIENT)
Dept: HEMATOLOGY ONCOLOGY | Facility: CLINIC | Age: 72
End: 2022-09-06

## 2022-09-06 ENCOUNTER — RESULT REVIEW (OUTPATIENT)
Age: 72
End: 2022-09-06

## 2022-09-06 VITALS
RESPIRATION RATE: 16 BRPM | OXYGEN SATURATION: 99 % | HEART RATE: 50 BPM | SYSTOLIC BLOOD PRESSURE: 166 MMHG | DIASTOLIC BLOOD PRESSURE: 79 MMHG | HEIGHT: 63.39 IN | BODY MASS INDEX: 26.93 KG/M2 | TEMPERATURE: 97.5 F | WEIGHT: 153.88 LBS

## 2022-09-06 DIAGNOSIS — Z80.7 FAMILY HISTORY OF OTHER MALIGNANT NEOPLASMS OF LYMPHOID, HEMATOPOIETIC AND RELATED TISSUES: ICD-10-CM

## 2022-09-06 DIAGNOSIS — R53.83 OTHER FATIGUE: ICD-10-CM

## 2022-09-06 DIAGNOSIS — Z80.0 FAMILY HISTORY OF MALIGNANT NEOPLASM OF DIGESTIVE ORGANS: ICD-10-CM

## 2022-09-06 DIAGNOSIS — I10 ESSENTIAL (PRIMARY) HYPERTENSION: ICD-10-CM

## 2022-09-06 LAB
ALBUMIN SERPL ELPH-MCNC: 4.7 G/DL
ALP BLD-CCNC: 85 U/L
ALT SERPL-CCNC: 35 U/L
ANION GAP SERPL CALC-SCNC: 12 MMOL/L
AST SERPL-CCNC: 24 U/L
B2 MICROGLOB SERPL-MCNC: 2 MG/L
BASOPHILS # BLD AUTO: 0.03 K/UL — SIGNIFICANT CHANGE UP (ref 0–0.2)
BASOPHILS NFR BLD AUTO: 0.7 % — SIGNIFICANT CHANGE UP (ref 0–2)
BILIRUB SERPL-MCNC: 0.3 MG/DL
BUN SERPL-MCNC: 22 MG/DL
CALCIUM SERPL-MCNC: 9.7 MG/DL
CHLORIDE SERPL-SCNC: 105 MMOL/L
CO2 SERPL-SCNC: 24 MMOL/L
CREAT SERPL-MCNC: 1.18 MG/DL
DEPRECATED KAPPA LC FREE/LAMBDA SER: 1.62 RATIO
EGFR: 66 ML/MIN/1.73M2
EOSINOPHIL # BLD AUTO: 0.2 K/UL — SIGNIFICANT CHANGE UP (ref 0–0.5)
EOSINOPHIL NFR BLD AUTO: 4.9 % — SIGNIFICANT CHANGE UP (ref 0–6)
GLUCOSE SERPL-MCNC: 116 MG/DL
HCT VFR BLD CALC: 37.8 % — LOW (ref 39–50)
HGB BLD-MCNC: 12.2 G/DL — LOW (ref 13–17)
IGA SER QL IEP: 101 MG/DL
IGG SER QL IEP: 699 MG/DL
IGM SER QL IEP: 31 MG/DL
IMM GRANULOCYTES NFR BLD AUTO: 0.5 % — SIGNIFICANT CHANGE UP (ref 0–1.5)
KAPPA LC CSF-MCNC: 1.33 MG/DL
KAPPA LC SERPL-MCNC: 2.16 MG/DL
LDH SERPL-CCNC: 182 U/L
LYMPHOCYTES # BLD AUTO: 0.69 K/UL — LOW (ref 1–3.3)
LYMPHOCYTES # BLD AUTO: 16.9 % — SIGNIFICANT CHANGE UP (ref 13–44)
MCHC RBC-ENTMCNC: 27.9 PG — SIGNIFICANT CHANGE UP (ref 27–34)
MCHC RBC-ENTMCNC: 32.3 G/DL — SIGNIFICANT CHANGE UP (ref 32–36)
MCV RBC AUTO: 86.5 FL — SIGNIFICANT CHANGE UP (ref 80–100)
MONOCYTES # BLD AUTO: 0.4 K/UL — SIGNIFICANT CHANGE UP (ref 0–0.9)
MONOCYTES NFR BLD AUTO: 9.8 % — SIGNIFICANT CHANGE UP (ref 2–14)
NEUTROPHILS # BLD AUTO: 2.75 K/UL — SIGNIFICANT CHANGE UP (ref 1.8–7.4)
NEUTROPHILS NFR BLD AUTO: 67.2 % — SIGNIFICANT CHANGE UP (ref 43–77)
NRBC # BLD: 0 /100 WBCS — SIGNIFICANT CHANGE UP (ref 0–0)
PLATELET # BLD AUTO: 188 K/UL — SIGNIFICANT CHANGE UP (ref 150–400)
POTASSIUM SERPL-SCNC: 4.8 MMOL/L
PROT SERPL-MCNC: 6.6 G/DL
RBC # BLD: 4.37 M/UL — SIGNIFICANT CHANGE UP (ref 4.2–5.8)
RBC # FLD: 14.3 % — SIGNIFICANT CHANGE UP (ref 10.3–14.5)
RETICS #: 92.8 K/UL — SIGNIFICANT CHANGE UP (ref 25–125)
RETICS/RBC NFR: 2.1 % — SIGNIFICANT CHANGE UP (ref 0.5–2.5)
SODIUM SERPL-SCNC: 141 MMOL/L
WBC # BLD: 4.09 K/UL — SIGNIFICANT CHANGE UP (ref 3.8–10.5)
WBC # FLD AUTO: 4.09 K/UL — SIGNIFICANT CHANGE UP (ref 3.8–10.5)

## 2022-09-06 PROCEDURE — 99214 OFFICE O/P EST MOD 30 MIN: CPT

## 2022-09-06 NOTE — CONSULT LETTER
[Dear  ___] : Dear  [unfilled], [Courtesy Letter:] : I had the pleasure of seeing your patient, [unfilled], in my office today. [Please see my note below.] : Please see my note below. [Sincerely,] : Sincerely, [DrAmanda  ___] : Dr. FAUST [FreeTextEntry2] : Joe Mcfarlane M.D.\par [place] [FreeTextEntry3] : Adi Kingston M.D., [facility]\par

## 2022-09-06 NOTE — REVIEW OF SYSTEMS
[Fatigue] : fatigue [Negative] : Allergic/Immunologic [Confused] : no confusion [Dizziness] : no dizziness [Fainting] : no fainting [Difficulty Walking] : no difficulty walking [FreeTextEntry7] : GERD controlled with PPI [de-identified] : plantar neuropathy as above, better

## 2022-09-06 NOTE — PHYSICAL EXAM
[Fully active, able to carry on all pre-disease performance without restriction] : Status 0 - Fully active, able to carry on all pre-disease performance without restriction [Normal] : affect appropriate [Ulcers] : no ulcers [Mucositis] : no mucositis [Thrush] : no thrush [de-identified] : no CVAT

## 2022-09-06 NOTE — ASSESSMENT
[Curative] : Goals of care discussed with patient: Curative [FreeTextEntry1] : DLBCL, GBC type non-DH. S/p R-CHOP x 6 with last 3 cycles given on a q14d schedule post periop delay\par due to  SBO with SB resection.  \par PET/CT for 5/2020: Resolution of intraluminal small bowel foci (Deauville 1)\par CT C/A/P 10/31/20,  5/1/21, 11/9/21 CLEVE\par \par Post- herpetic neuralgia resolved\par PN mild, but persistent only at night\par \par CBC results reviewed and d/w pt\par WBC 4.09 Hgb 12.2, Plt  188K,  diff nl except for persistent lymphocytopenia\par \par Persistent mild normocytic anemia; colonoscopy in 2019 normal, Iron studies in October normal, B12 nl\par will  recheck iron studies today, as well as retic, haptoglobin, immunoglobulins. \par \par Check CMP, LDH, beta 2 MG; Will also check quantitative immunoglobulins post rituxan\par now past 2 yrs post completion of rx: check final CT C/A/P with contrast\par \par RV 4 mos\par

## 2022-09-06 NOTE — HISTORY OF PRESENT ILLNESS
[Disease:__________________________] : Disease: [unfilled] [Cycle: ___] : Cycle: [unfilled] [de-identified] : Mr. Hposon is a 68 yo WM with a small bowel mass and associated mild hydronephrosis.  He presented with anemia in routine labs i(Hgb 11.8) n May, 2019\par He was admitted to [facility] in 1/2018 with abdominal pain and was found to have an SBO.  CT scan showed SBO with transition in RLQ, no significant adenopathy, small amount of ascites. Sxs and SBO resolved with NGT decompression and has not recurred.  Cologard had been (-)  3/2018.  There was lab evidence of iron deficiency.  Colonoscopy 8/1/19 was aborted due to poor prep.  Repeat on August 14, 2019 showed erythema in rectum and diverticular dz, with rectal bxs showing focal active colitis.   He developed abd pain after the recent colonoscopy and repeat CT scan on 8/28/19 showed a new large apple core mass in lower abd/upper pelvis, 8.6 x 6.8 x 6.2 cm with marked nodular circumferential wall thickening.  Mild left hydroureteronephrosis was seen.  There was no LAD or H/S megaly.  Ascites was not noted. \par He continues to have intermittent post-prandial dull discomfort to the left of umbilicus and extending to LLQ.  He has had no melena, hematochezia or change in bowel habits.  He has had no constitutional symptoms other than unintended 8 pound weight loss in the last few weeks.\par He has a h/o ASHD and had a stent placed in the LAD  in 2013.   Prostate cancer was treated in 2008 with Cyberknife at [place] in 2008.\par He has had regular urologic f/u's and he reports that his PSA is undetectable.\par He has DANIEL and uses a dental device; he previously responded for a few years after uvulectomy. 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 \par gait is fine\par Post herpetic pain -resolved, off cymbalta\par had  right inguinal hernia repair  8/11/20 \par no constitutional c/o\par \par CT C/A/P  11/21  CLEVE\par

## 2022-09-07 LAB
FERRITIN SERPL-MCNC: 342 NG/ML
HAPTOGLOB SERPL-MCNC: 112 MG/DL
IRON SATN MFR SERPL: 21 %
IRON SERPL-MCNC: 67 UG/DL
TIBC SERPL-MCNC: 322 UG/DL
UIBC SERPL-MCNC: 256 UG/DL

## 2022-09-09 ENCOUNTER — RESULT REVIEW (OUTPATIENT)
Age: 72
End: 2022-09-09

## 2022-09-15 ENCOUNTER — APPOINTMENT (OUTPATIENT)
Dept: CT IMAGING | Facility: CLINIC | Age: 72
End: 2022-09-15

## 2022-09-15 PROCEDURE — 71260 CT THORAX DX C+: CPT

## 2022-09-15 PROCEDURE — 74177 CT ABD & PELVIS W/CONTRAST: CPT

## 2022-09-19 ENCOUNTER — RESULT REVIEW (OUTPATIENT)
Age: 72
End: 2022-09-19

## 2022-09-22 ENCOUNTER — APPOINTMENT (OUTPATIENT)
Dept: NUCLEAR MEDICINE | Facility: CLINIC | Age: 72
End: 2022-09-22

## 2022-09-22 ENCOUNTER — OUTPATIENT (OUTPATIENT)
Dept: OUTPATIENT SERVICES | Facility: HOSPITAL | Age: 72
LOS: 1 days | End: 2022-09-22

## 2022-09-22 DIAGNOSIS — C92.00 ACUTE MYELOBLASTIC LEUKEMIA, NOT HAVING ACHIEVED REMISSION: ICD-10-CM

## 2022-09-22 DIAGNOSIS — Z90.49 ACQUIRED ABSENCE OF OTHER SPECIFIED PARTS OF DIGESTIVE TRACT: Chronic | ICD-10-CM

## 2022-09-22 PROCEDURE — 78815 PET IMAGE W/CT SKULL-THIGH: CPT | Mod: 26,PS,KX

## 2023-01-12 ENCOUNTER — NON-APPOINTMENT (OUTPATIENT)
Age: 73
End: 2023-01-12

## 2023-03-01 ENCOUNTER — OUTPATIENT (OUTPATIENT)
Dept: OUTPATIENT SERVICES | Facility: HOSPITAL | Age: 73
LOS: 1 days | Discharge: HOME | End: 2023-03-01

## 2023-03-01 DIAGNOSIS — C83.30 DIFFUSE LARGE B-CELL LYMPHOMA, UNSPECIFIED SITE: ICD-10-CM

## 2023-03-01 DIAGNOSIS — Z90.49 ACQUIRED ABSENCE OF OTHER SPECIFIED PARTS OF DIGESTIVE TRACT: Chronic | ICD-10-CM

## 2023-03-05 DIAGNOSIS — Z85.6 PERSONAL HISTORY OF LEUKEMIA: ICD-10-CM

## 2023-03-09 ENCOUNTER — LABORATORY RESULT (OUTPATIENT)
Age: 73
End: 2023-03-09

## 2023-03-09 ENCOUNTER — RESULT REVIEW (OUTPATIENT)
Age: 73
End: 2023-03-09

## 2023-03-09 ENCOUNTER — APPOINTMENT (OUTPATIENT)
Dept: HEMATOLOGY ONCOLOGY | Facility: CLINIC | Age: 73
End: 2023-03-09
Payer: MEDICARE

## 2023-03-09 VITALS
DIASTOLIC BLOOD PRESSURE: 73 MMHG | RESPIRATION RATE: 16 BRPM | HEIGHT: 63.94 IN | TEMPERATURE: 98.1 F | HEART RATE: 59 BPM | BODY MASS INDEX: 26.06 KG/M2 | OXYGEN SATURATION: 99 % | WEIGHT: 150.77 LBS | SYSTOLIC BLOOD PRESSURE: 133 MMHG

## 2023-03-09 DIAGNOSIS — C61 MALIGNANT NEOPLASM OF PROSTATE: ICD-10-CM

## 2023-03-09 LAB
ALBUMIN SERPL ELPH-MCNC: 4.5 G/DL
ALP BLD-CCNC: 108 U/L
ALT SERPL-CCNC: 36 U/L
ANION GAP SERPL CALC-SCNC: 14 MMOL/L
AST SERPL-CCNC: 25 U/L
B2 MICROGLOB SERPL-MCNC: 2.3 MG/L
BASOPHILS # BLD AUTO: 0.05 K/UL — SIGNIFICANT CHANGE UP (ref 0–0.2)
BASOPHILS NFR BLD AUTO: 1 % — SIGNIFICANT CHANGE UP (ref 0–2)
BILIRUB SERPL-MCNC: 0.3 MG/DL
BUN SERPL-MCNC: 20 MG/DL
CALCIUM SERPL-MCNC: 9.5 MG/DL
CHLORIDE SERPL-SCNC: 104 MMOL/L
CO2 SERPL-SCNC: 24 MMOL/L
CREAT SERPL-MCNC: 1 MG/DL
EGFR: 80 ML/MIN/1.73M2
EOSINOPHIL # BLD AUTO: 0.3 K/UL — SIGNIFICANT CHANGE UP (ref 0–0.5)
EOSINOPHIL NFR BLD AUTO: 5.9 % — SIGNIFICANT CHANGE UP (ref 0–6)
GLUCOSE SERPL-MCNC: 115 MG/DL
HCT VFR BLD CALC: 37.9 % — LOW (ref 39–50)
HGB BLD-MCNC: 12.3 G/DL — LOW (ref 13–17)
IMM GRANULOCYTES NFR BLD AUTO: 0.6 % — SIGNIFICANT CHANGE UP (ref 0–0.9)
LDH SERPL-CCNC: 184 U/L
LYMPHOCYTES # BLD AUTO: 0.8 K/UL — LOW (ref 1–3.3)
LYMPHOCYTES # BLD AUTO: 15.8 % — SIGNIFICANT CHANGE UP (ref 13–44)
MCHC RBC-ENTMCNC: 27.9 PG — SIGNIFICANT CHANGE UP (ref 27–34)
MCHC RBC-ENTMCNC: 32.5 G/DL — SIGNIFICANT CHANGE UP (ref 32–36)
MCV RBC AUTO: 85.9 FL — SIGNIFICANT CHANGE UP (ref 80–100)
MONOCYTES # BLD AUTO: 0.58 K/UL — SIGNIFICANT CHANGE UP (ref 0–0.9)
MONOCYTES NFR BLD AUTO: 11.5 % — SIGNIFICANT CHANGE UP (ref 2–14)
NEUTROPHILS # BLD AUTO: 3.3 K/UL — SIGNIFICANT CHANGE UP (ref 1.8–7.4)
NEUTROPHILS NFR BLD AUTO: 65.2 % — SIGNIFICANT CHANGE UP (ref 43–77)
NRBC # BLD: 0 /100 WBCS — SIGNIFICANT CHANGE UP (ref 0–0)
PLATELET # BLD AUTO: 213 K/UL — SIGNIFICANT CHANGE UP (ref 150–400)
POTASSIUM SERPL-SCNC: 5.1 MMOL/L
PROT SERPL-MCNC: 6.7 G/DL
RBC # BLD: 4.41 M/UL — SIGNIFICANT CHANGE UP (ref 4.2–5.8)
RBC # FLD: 14.3 % — SIGNIFICANT CHANGE UP (ref 10.3–14.5)
RETICS #: 62.9 K/UL — SIGNIFICANT CHANGE UP (ref 25–125)
RETICS/RBC NFR: 1.4 % — SIGNIFICANT CHANGE UP (ref 0.5–2.5)
SODIUM SERPL-SCNC: 141 MMOL/L
WBC # BLD: 5.06 K/UL — SIGNIFICANT CHANGE UP (ref 3.8–10.5)
WBC # FLD AUTO: 5.06 K/UL — SIGNIFICANT CHANGE UP (ref 3.8–10.5)

## 2023-03-09 PROCEDURE — 99213 OFFICE O/P EST LOW 20 MIN: CPT

## 2023-03-10 PROBLEM — C61 PROSTATE CANCER: Status: ACTIVE | Noted: 2019-09-12

## 2023-03-10 NOTE — PHYSICAL EXAM
[Fully active, able to carry on all pre-disease performance without restriction] : Status 0 - Fully active, able to carry on all pre-disease performance without restriction [Normal] : affect appropriate [Ulcers] : no ulcers [Mucositis] : no mucositis [Thrush] : no thrush [de-identified] : no CVAT

## 2023-03-10 NOTE — CONSULT LETTER
[DrAmanda  ___] : Dr. FAUST [DrAmanda ___] : Dr. FAUST [FreeTextEntry3] : Adi Kingston M.D., [facility]\par

## 2023-03-10 NOTE — REVIEW OF SYSTEMS
[Fatigue] : fatigue [Negative] : Allergic/Immunologic [Confused] : no confusion [Dizziness] : no dizziness [Fainting] : no fainting [Difficulty Walking] : no difficulty walking [FreeTextEntry7] : GERD remains well controlled with PPI [de-identified] : plantar neuropathy as above, better

## 2023-03-10 NOTE — ASSESSMENT
[Curative] : Goals of care discussed with patient: Curative [FreeTextEntry1] : DLBCL, GBC type non-DH. S/p R-CHOP x 6 with last 3 cycles given on a q14d schedule post periop delay\par due to  SBO with SB resection.  \par PET/CT post therapy  5/2020: Resolution of intraluminal small bowel foci (Deauville 1)\par CT C/A/P 10/31/20,  5/1/21, 11/9/21 CLEVE\par \par Post- herpetic neuralgia resolved\par PN mild, but persistent only at night\par \par CBC results reviewed and d/w pt\par WBC 5.06, Hgb 12.3, Plt  213K,  diff nl except for persistent lymphocytopenia\par Hgb never fully normalized. Recent haptoglobin, retic, iron studies nl.\par colonoscopy in 2019 normal, no GI c/o, melena, hematochezia\par \par Check CMP, LDH, beta 2 MG, immunoelectrophoresis, B12\par follow CBC\par \par plan no further imaging unless there is a clinical indication\par \par RV 4 mos\par

## 2023-03-10 NOTE — HISTORY OF PRESENT ILLNESS
[Cycle: ___] : Cycle: [unfilled] [de-identified] : Mr. Hopson is a 68 yo WM with small bowel DLBCL in CR. He presented with a small bowel mass and associated mild hydronephrosis.  He presented with anemia in routine labs i(Hgb 11.8) n May, 2019\par He was admitted to [facility] in 1/2018 with abdominal pain and was found to have an SBO.  CT scan showed SBO with transition in RLQ, no significant adenopathy, small amount of ascites. Sxs and SBO resolved with NGT decompression and has not recurred.  Cologard had been (-)  3/2018.  There was lab evidence of iron deficiency.  Colonoscopy 8/1/19 was aborted due to poor prep.  Repeat on August 14, 2019 showed erythema in rectum and diverticular dz, with rectal bxs showing focal active colitis.   He developed abd pain after the recent colonoscopy and repeat CT scan on 8/28/19 showed a new large apple core mass in lower abd/upper pelvis, 8.6 x 6.8 x 6.2 cm with marked nodular circumferential wall thickening.  Mild left hydroureteronephrosis was seen.  There was no LAD or H/S megaly.  Ascites was not noted. \par He continues to have intermittent post-prandial dull discomfort to the left of umbilicus and extending to LLQ.  He has had no melena, hematochezia or change in bowel habits.  He has had no constitutional symptoms other than unintended 8 pound weight loss in the last few weeks.\par He has a h/o ASHD and had a stent placed in the LAD  in 2013.   Prostate cancer was treated in 2008 with Cyberknife at [place] in 2008.\par He has had regular urologic f/u's and he reports that his PSA is undetectable.\par He has DANIEL and uses a dental device; he previously responded for a few years after uvulectomy. 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 \par had lingering URI, cough, now resolved, Covid (-)\par gait is normal\par Post herpetic pain -resolved, off cymbalta\par \par \par

## 2023-10-27 ENCOUNTER — OUTPATIENT (OUTPATIENT)
Dept: OUTPATIENT SERVICES | Facility: HOSPITAL | Age: 73
LOS: 1 days | Discharge: HOME | End: 2023-10-27

## 2023-10-27 DIAGNOSIS — Z90.49 ACQUIRED ABSENCE OF OTHER SPECIFIED PARTS OF DIGESTIVE TRACT: Chronic | ICD-10-CM

## 2023-10-27 DIAGNOSIS — C83.30 DIFFUSE LARGE B-CELL LYMPHOMA, UNSPECIFIED SITE: ICD-10-CM

## 2023-10-30 ENCOUNTER — APPOINTMENT (OUTPATIENT)
Dept: HEMATOLOGY ONCOLOGY | Facility: CLINIC | Age: 73
End: 2023-10-30

## 2023-10-30 ENCOUNTER — RESULT REVIEW (OUTPATIENT)
Age: 73
End: 2023-10-30

## 2023-10-30 ENCOUNTER — APPOINTMENT (OUTPATIENT)
Dept: HEMATOLOGY ONCOLOGY | Facility: CLINIC | Age: 73
End: 2023-10-30
Payer: MEDICARE

## 2023-10-30 VITALS
OXYGEN SATURATION: 99 % | HEART RATE: 52 BPM | DIASTOLIC BLOOD PRESSURE: 64 MMHG | SYSTOLIC BLOOD PRESSURE: 131 MMHG | WEIGHT: 150.13 LBS | RESPIRATION RATE: 16 BRPM | BODY MASS INDEX: 25.82 KG/M2 | TEMPERATURE: 97.5 F

## 2023-10-30 DIAGNOSIS — Z86.19 PERSONAL HISTORY OF OTHER INFECTIOUS AND PARASITIC DISEASES: ICD-10-CM

## 2023-10-30 DIAGNOSIS — Z85.46 PERSONAL HISTORY OF MALIGNANT NEOPLASM OF PROSTATE: ICD-10-CM

## 2023-10-30 DIAGNOSIS — D64.9 ANEMIA, UNSPECIFIED: ICD-10-CM

## 2023-10-30 DIAGNOSIS — D70.1 AGRANULOCYTOSIS SECONDARY TO CANCER CHEMOTHERAPY: ICD-10-CM

## 2023-10-30 DIAGNOSIS — D70.9 NEUTROPENIA, UNSPECIFIED: ICD-10-CM

## 2023-10-30 DIAGNOSIS — R50.81 NEUTROPENIA, UNSPECIFIED: ICD-10-CM

## 2023-10-30 DIAGNOSIS — Z87.39 PERSONAL HISTORY OF OTHER DISEASES OF THE MUSCULOSKELETAL SYSTEM AND CONNECTIVE TISSUE: ICD-10-CM

## 2023-10-30 DIAGNOSIS — Z90.49 ACQUIRED ABSENCE OF OTHER SPECIFIED PARTS OF DIGESTIVE TRACT: ICD-10-CM

## 2023-10-30 DIAGNOSIS — D72.810 LYMPHOCYTOPENIA: ICD-10-CM

## 2023-10-30 DIAGNOSIS — T45.1X5A AGRANULOCYTOSIS SECONDARY TO CANCER CHEMOTHERAPY: ICD-10-CM

## 2023-10-30 DIAGNOSIS — A41.59 OTHER GRAM-NEGATIVE SEPSIS: ICD-10-CM

## 2023-10-30 DIAGNOSIS — K21.9 GASTRO-ESOPHAGEAL REFLUX DISEASE W/OUT ESOPHAGITIS: ICD-10-CM

## 2023-10-30 LAB
BASOPHILS # BLD AUTO: 0.04 K/UL — SIGNIFICANT CHANGE UP (ref 0–0.2)
BASOPHILS # BLD AUTO: 0.04 K/UL — SIGNIFICANT CHANGE UP (ref 0–0.2)
BASOPHILS NFR BLD AUTO: 0.8 % — SIGNIFICANT CHANGE UP (ref 0–2)
BASOPHILS NFR BLD AUTO: 0.8 % — SIGNIFICANT CHANGE UP (ref 0–2)
EOSINOPHIL # BLD AUTO: 0.21 K/UL — SIGNIFICANT CHANGE UP (ref 0–0.5)
EOSINOPHIL # BLD AUTO: 0.21 K/UL — SIGNIFICANT CHANGE UP (ref 0–0.5)
EOSINOPHIL NFR BLD AUTO: 4.4 % — SIGNIFICANT CHANGE UP (ref 0–6)
EOSINOPHIL NFR BLD AUTO: 4.4 % — SIGNIFICANT CHANGE UP (ref 0–6)
HCT VFR BLD CALC: 38.3 % — LOW (ref 39–50)
HCT VFR BLD CALC: 38.3 % — LOW (ref 39–50)
HGB BLD-MCNC: 12.6 G/DL — LOW (ref 13–17)
HGB BLD-MCNC: 12.6 G/DL — LOW (ref 13–17)
IMM GRANULOCYTES NFR BLD AUTO: 0.2 % — SIGNIFICANT CHANGE UP (ref 0–0.9)
IMM GRANULOCYTES NFR BLD AUTO: 0.2 % — SIGNIFICANT CHANGE UP (ref 0–0.9)
LYMPHOCYTES # BLD AUTO: 0.75 K/UL — LOW (ref 1–3.3)
LYMPHOCYTES # BLD AUTO: 0.75 K/UL — LOW (ref 1–3.3)
LYMPHOCYTES # BLD AUTO: 15.9 % — SIGNIFICANT CHANGE UP (ref 13–44)
LYMPHOCYTES # BLD AUTO: 15.9 % — SIGNIFICANT CHANGE UP (ref 13–44)
MCHC RBC-ENTMCNC: 28.9 PG — SIGNIFICANT CHANGE UP (ref 27–34)
MCHC RBC-ENTMCNC: 28.9 PG — SIGNIFICANT CHANGE UP (ref 27–34)
MCHC RBC-ENTMCNC: 32.9 G/DL — SIGNIFICANT CHANGE UP (ref 32–36)
MCHC RBC-ENTMCNC: 32.9 G/DL — SIGNIFICANT CHANGE UP (ref 32–36)
MCV RBC AUTO: 87.8 FL — SIGNIFICANT CHANGE UP (ref 80–100)
MCV RBC AUTO: 87.8 FL — SIGNIFICANT CHANGE UP (ref 80–100)
MONOCYTES # BLD AUTO: 0.47 K/UL — SIGNIFICANT CHANGE UP (ref 0–0.9)
MONOCYTES # BLD AUTO: 0.47 K/UL — SIGNIFICANT CHANGE UP (ref 0–0.9)
MONOCYTES NFR BLD AUTO: 9.9 % — SIGNIFICANT CHANGE UP (ref 2–14)
MONOCYTES NFR BLD AUTO: 9.9 % — SIGNIFICANT CHANGE UP (ref 2–14)
NEUTROPHILS # BLD AUTO: 3.25 K/UL — SIGNIFICANT CHANGE UP (ref 1.8–7.4)
NEUTROPHILS # BLD AUTO: 3.25 K/UL — SIGNIFICANT CHANGE UP (ref 1.8–7.4)
NEUTROPHILS NFR BLD AUTO: 68.8 % — SIGNIFICANT CHANGE UP (ref 43–77)
NEUTROPHILS NFR BLD AUTO: 68.8 % — SIGNIFICANT CHANGE UP (ref 43–77)
NRBC # BLD: 0 /100 WBCS — SIGNIFICANT CHANGE UP (ref 0–0)
NRBC # BLD: 0 /100 WBCS — SIGNIFICANT CHANGE UP (ref 0–0)
PLATELET # BLD AUTO: 231 K/UL — SIGNIFICANT CHANGE UP (ref 150–400)
PLATELET # BLD AUTO: 231 K/UL — SIGNIFICANT CHANGE UP (ref 150–400)
RBC # BLD: 4.36 M/UL — SIGNIFICANT CHANGE UP (ref 4.2–5.8)
RBC # BLD: 4.36 M/UL — SIGNIFICANT CHANGE UP (ref 4.2–5.8)
RBC # FLD: 14.3 % — SIGNIFICANT CHANGE UP (ref 10.3–14.5)
RBC # FLD: 14.3 % — SIGNIFICANT CHANGE UP (ref 10.3–14.5)
WBC # BLD: 4.73 K/UL — SIGNIFICANT CHANGE UP (ref 3.8–10.5)
WBC # BLD: 4.73 K/UL — SIGNIFICANT CHANGE UP (ref 3.8–10.5)
WBC # FLD AUTO: 4.73 K/UL — SIGNIFICANT CHANGE UP (ref 3.8–10.5)
WBC # FLD AUTO: 4.73 K/UL — SIGNIFICANT CHANGE UP (ref 3.8–10.5)

## 2023-10-30 PROCEDURE — 99214 OFFICE O/P EST MOD 30 MIN: CPT

## 2023-10-30 RX ORDER — EZETIMIBE 10 MG/1
10 TABLET ORAL DAILY
Refills: 0 | Status: ACTIVE | COMMUNITY
Start: 2023-10-30

## 2023-10-31 PROBLEM — A41.59 KLEBSIELLA SEPSIS: Status: RESOLVED | Noted: 2019-10-24 | Resolved: 2023-10-31

## 2023-10-31 PROBLEM — D70.1 CHEMOTHERAPY-INDUCED NEUTROPENIA: Status: RESOLVED | Noted: 2020-03-10 | Resolved: 2023-10-31

## 2023-10-31 PROBLEM — D70.9 NEUTROPENIC FEVER: Status: RESOLVED | Noted: 2019-10-17 | Resolved: 2023-10-31

## 2023-10-31 PROBLEM — Z85.46 HISTORY OF MALIGNANT NEOPLASM OF PROSTATE: Status: RESOLVED | Noted: 2019-09-04 | Resolved: 2023-10-31

## 2023-10-31 PROBLEM — Z87.39 HISTORY OF GOUT: Status: RESOLVED | Noted: 2019-09-04 | Resolved: 2023-10-31

## 2023-10-31 PROBLEM — Z86.19 HISTORY OF HERPES ZOSTER: Status: RESOLVED | Noted: 2019-12-16 | Resolved: 2021-01-04

## 2023-11-02 NOTE — ED PROVIDER NOTE - DISPOSITION TYPE
Learning About Lung Cancer Screening  What is screening for lung cancer? Lung cancer screening is a way to find some lung cancers early, before a person has any symptoms of the cancer. Lung cancer screening may help those who have the highest risk for lung cancer--people age 48 and older who are or were heavy smokers. For most people, who aren't at increased risk, screening for lung cancer probably isn't helpful. Screening won't prevent cancer. And it may not find all lung cancers. Lung cancer screening may lower the risk of dying from lung cancer in a small number of people. How is it done? Lung cancer screening is done with a low-dose CT (computed tomography) scan. A CT scan uses X-rays, or radiation, to make detailed pictures of your body. Experts recommend that screening be done in medical centers that focus on finding and treating lung cancer. Who is screening recommended for? Lung cancer screening is recommended for people age 48 and older who are or were heavy smokers. That means people with a smoking history of at least 20 pack years. A pack year is a way to measure how heavy a smoker you are or were. To figure out your pack years, multiply how many packs a day on average (assuming 20 cigarettes per pack) you have smoked by how many years you have smoked. For example: If you smoked 1 pack a day for 20 years, that's 1 times 20. So you have a smoking history of 20 pack years. If you smoked 2 packs a day for 10 years, that's 2 times 10. So you have a smoking history of 20 pack years. Experts agree that screening is for people who have a high risk of lung cancer. But experts don't agree on what high risk means. Some say people age 48 or older with at least a 20-pack-year smoking history are high risk. Others say it's people age 54 or older with a 30-pack-year history. To see if you could benefit from screening, first find out if you are at high risk for lung cancer.  Your doctor can help you DISCHARGE

## 2024-02-21 ENCOUNTER — OFFICE (OUTPATIENT)
Dept: URBAN - METROPOLITAN AREA CLINIC 109 | Facility: CLINIC | Age: 74
Setting detail: OPHTHALMOLOGY
End: 2024-02-21
Payer: MEDICARE

## 2024-02-21 DIAGNOSIS — H25.13: ICD-10-CM

## 2024-02-21 PROCEDURE — 92014 COMPRE OPH EXAM EST PT 1/>: CPT | Performed by: OPHTHALMOLOGY

## 2024-02-21 ASSESSMENT — REFRACTION_AUTOREFRACTION
OD_CYLINDER: -0.75
OS_AXIS: 103
OS_SPHERE: +0.25
OD_SPHERE: 0.00
OD_AXIS: 63
OS_CYLINDER: -0.75

## 2024-02-21 ASSESSMENT — SPHEQUIV_DERIVED
OS_SPHEQUIV: -0.125
OD_SPHEQUIV: -0.375

## 2024-02-21 ASSESSMENT — REFRACTION_CURRENTRX
OS_SPHERE: +1.50
OD_SPHERE: +1.50
OD_OVR_VA: 20/
OS_OVR_VA: 20/

## 2024-02-21 ASSESSMENT — CONFRONTATIONAL VISUAL FIELD TEST (CVF)
OS_FINDINGS: FULL
OD_FINDINGS: FULL

## 2024-03-17 ENCOUNTER — NON-APPOINTMENT (OUTPATIENT)
Age: 74
End: 2024-03-17

## 2024-06-10 ENCOUNTER — OUTPATIENT (OUTPATIENT)
Dept: OUTPATIENT SERVICES | Facility: HOSPITAL | Age: 74
LOS: 1 days | Discharge: HOME | End: 2024-06-10

## 2024-06-10 DIAGNOSIS — Z90.49 ACQUIRED ABSENCE OF OTHER SPECIFIED PARTS OF DIGESTIVE TRACT: Chronic | ICD-10-CM

## 2024-06-10 DIAGNOSIS — C83.30 DIFFUSE LARGE B-CELL LYMPHOMA, UNSPECIFIED SITE: ICD-10-CM

## 2024-06-13 ENCOUNTER — APPOINTMENT (OUTPATIENT)
Dept: HEMATOLOGY ONCOLOGY | Facility: CLINIC | Age: 74
End: 2024-06-13

## 2024-06-13 ENCOUNTER — APPOINTMENT (OUTPATIENT)
Dept: HEMATOLOGY ONCOLOGY | Facility: CLINIC | Age: 74
End: 2024-06-13
Payer: MEDICARE

## 2024-06-13 ENCOUNTER — RESULT REVIEW (OUTPATIENT)
Age: 74
End: 2024-06-13

## 2024-06-13 VITALS
OXYGEN SATURATION: 99 % | HEART RATE: 51 BPM | BODY MASS INDEX: 25.4 KG/M2 | WEIGHT: 147.71 LBS | DIASTOLIC BLOOD PRESSURE: 82 MMHG | TEMPERATURE: 97.3 F | SYSTOLIC BLOOD PRESSURE: 165 MMHG | RESPIRATION RATE: 16 BRPM

## 2024-06-13 VITALS — SYSTOLIC BLOOD PRESSURE: 153 MMHG | DIASTOLIC BLOOD PRESSURE: 69 MMHG

## 2024-06-13 DIAGNOSIS — C83.30 DIFFUSE LARGE B-CELL LYMPHOMA, UNSPECIFIED SITE: ICD-10-CM

## 2024-06-13 DIAGNOSIS — Z78.9 OTHER SPECIFIED HEALTH STATUS: ICD-10-CM

## 2024-06-13 DIAGNOSIS — G62.9 POLYNEUROPATHY, UNSPECIFIED: ICD-10-CM

## 2024-06-13 DIAGNOSIS — Z92.21 PERSONAL HISTORY OF ANTINEOPLASTIC CHEMOTHERAPY: ICD-10-CM

## 2024-06-13 LAB
ALBUMIN SERPL ELPH-MCNC: 4.6 G/DL
ALP BLD-CCNC: 80 U/L
ALT SERPL-CCNC: 36 U/L
ANION GAP SERPL CALC-SCNC: 11 MMOL/L
AST SERPL-CCNC: 28 U/L
B2 MICROGLOB SERPL-MCNC: 2.1 MG/L
BASOPHILS # BLD AUTO: 0.03 K/UL — SIGNIFICANT CHANGE UP (ref 0–0.2)
BASOPHILS NFR BLD AUTO: 0.6 % — SIGNIFICANT CHANGE UP (ref 0–2)
BILIRUB SERPL-MCNC: 0.4 MG/DL
BUN SERPL-MCNC: 19 MG/DL
CALCIUM SERPL-MCNC: 9.6 MG/DL
CHLORIDE SERPL-SCNC: 106 MMOL/L
CO2 SERPL-SCNC: 24 MMOL/L
CREAT SERPL-MCNC: 0.97 MG/DL
EGFR: 82 ML/MIN/1.73M2
EOSINOPHIL # BLD AUTO: 0.18 K/UL — SIGNIFICANT CHANGE UP (ref 0–0.5)
EOSINOPHIL NFR BLD AUTO: 3.8 % — SIGNIFICANT CHANGE UP (ref 0–6)
GLUCOSE SERPL-MCNC: 118 MG/DL
HCT VFR BLD CALC: 38 % — LOW (ref 39–50)
HGB BLD-MCNC: 12.4 G/DL — LOW (ref 13–17)
IMM GRANULOCYTES NFR BLD AUTO: 0.4 % — SIGNIFICANT CHANGE UP (ref 0–0.9)
LYMPHOCYTES # BLD AUTO: 0.75 K/UL — LOW (ref 1–3.3)
LYMPHOCYTES # BLD AUTO: 15.8 % — SIGNIFICANT CHANGE UP (ref 13–44)
MCHC RBC-ENTMCNC: 28.4 PG — SIGNIFICANT CHANGE UP (ref 27–34)
MCHC RBC-ENTMCNC: 32.6 G/DL — SIGNIFICANT CHANGE UP (ref 32–36)
MCV RBC AUTO: 87 FL — SIGNIFICANT CHANGE UP (ref 80–100)
MONOCYTES # BLD AUTO: 0.54 K/UL — SIGNIFICANT CHANGE UP (ref 0–0.9)
MONOCYTES NFR BLD AUTO: 11.4 % — SIGNIFICANT CHANGE UP (ref 2–14)
NEUTROPHILS # BLD AUTO: 3.23 K/UL — SIGNIFICANT CHANGE UP (ref 1.8–7.4)
NEUTROPHILS NFR BLD AUTO: 68 % — SIGNIFICANT CHANGE UP (ref 43–77)
NRBC # BLD: 0 /100 WBCS — SIGNIFICANT CHANGE UP (ref 0–0)
NRBC BLD-RTO: 0 /100 WBCS — SIGNIFICANT CHANGE UP (ref 0–0)
PLATELET # BLD AUTO: 216 K/UL — SIGNIFICANT CHANGE UP (ref 150–400)
POTASSIUM SERPL-SCNC: 5.2 MMOL/L
PROT SERPL-MCNC: 6.9 G/DL
RBC # BLD: 4.37 M/UL — SIGNIFICANT CHANGE UP (ref 4.2–5.8)
RBC # FLD: 13.9 % — SIGNIFICANT CHANGE UP (ref 10.3–14.5)
SODIUM SERPL-SCNC: 140 MMOL/L
WBC # BLD: 4.75 K/UL — SIGNIFICANT CHANGE UP (ref 3.8–10.5)
WBC # FLD AUTO: 4.75 K/UL — SIGNIFICANT CHANGE UP (ref 3.8–10.5)

## 2024-06-13 PROCEDURE — 99213 OFFICE O/P EST LOW 20 MIN: CPT

## 2024-06-13 RX ORDER — GABAPENTIN 100 MG/1
100 CAPSULE ORAL
Qty: 90 | Refills: 5 | Status: ACTIVE | COMMUNITY
Start: 2024-06-13 | End: 1900-01-01

## 2024-06-13 RX ORDER — ATORVASTATIN CALCIUM 80 MG/1
80 TABLET, FILM COATED ORAL
Qty: 90 | Refills: 0 | Status: DISCONTINUED | COMMUNITY
Start: 2022-06-05 | End: 2024-06-13

## 2024-06-22 PROBLEM — Z78.9 ALCOHOL USE: Status: ACTIVE | Noted: 2019-09-04

## 2024-06-22 LAB
ALBUMIN SERPL ELPH-MCNC: 4.8 G/DL
ALP BLD-CCNC: 87 U/L
ALT SERPL-CCNC: 45 U/L
ANION GAP SERPL CALC-SCNC: 12 MMOL/L
AST SERPL-CCNC: 34 U/L
B2 MICROGLOB SERPL-MCNC: 2.2 MG/L
BILIRUB SERPL-MCNC: 0.3 MG/DL
BUN SERPL-MCNC: 24 MG/DL
CALCIUM SERPL-MCNC: 9.6 MG/DL
CHLORIDE SERPL-SCNC: 104 MMOL/L
CO2 SERPL-SCNC: 25 MMOL/L
CREAT SERPL-MCNC: 1.12 MG/DL
DEPRECATED KAPPA LC FREE/LAMBDA SER: 1.88 RATIO
EGFR: 69 ML/MIN/1.73M2
GLUCOSE SERPL-MCNC: 114 MG/DL
IGA SER QL IEP: 120 MG/DL
IGG SER QL IEP: 641 MG/DL
IGM SER QL IEP: 35 MG/DL
KAPPA LC CSF-MCNC: 1.46 MG/DL
KAPPA LC SERPL-MCNC: 2.75 MG/DL
LDH SERPL-CCNC: 211 U/L
POTASSIUM SERPL-SCNC: 5.1 MMOL/L
PROT SERPL-MCNC: 6.9 G/DL
SODIUM SERPL-SCNC: 141 MMOL/L

## 2024-06-22 NOTE — HISTORY OF PRESENT ILLNESS
[Disease:__________________________] : Disease: [unfilled] [Cycle: ___] : Cycle: [unfilled] [de-identified] : Mr. Hopson is a 68 yo WM with small bowel DLBCL in CR. He presented with a small bowel mass and associated mild hydronephrosis.  He presented with anemia in routine labs i(Hgb 11.8) n May, 2019\par  He was admitted to [facility] in 1/2018 with abdominal pain and was found to have an SBO.  CT scan showed SBO with transition in RLQ, no significant adenopathy, small amount of ascites. Sxs and SBO resolved with NGT decompression and has not recurred.  Cologard had been (-)  3/2018.  There was lab evidence of iron deficiency.  Colonoscopy 8/1/19 was aborted due to poor prep.  Repeat on August 14, 2019 showed erythema in rectum and diverticular dz, with rectal bxs showing focal active colitis.   He developed abd pain after the recent colonoscopy and repeat CT scan on 8/28/19 showed a new large apple core mass in lower abd/upper pelvis, 8.6 x 6.8 x 6.2 cm with marked nodular circumferential wall thickening.  Mild left hydroureteronephrosis was seen.  There was no LAD or H/S megaly.  Ascites was not noted. \par  He continues to have intermittent post-prandial dull discomfort to the left of umbilicus and extending to LLQ.  He has had no melena, hematochezia or change in bowel habits.  He has had no constitutional symptoms other than unintended 8 pound weight loss in the last few weeks.\par  He has a h/o ASHD and had a stent placed in the LAD  in 2013.   Prostate cancer was treated in 2008 with Cyberknife at [place] in 2008.\par  He has had regular urologic f/u's and he reports that his PSA is undetectable.\par  He has DANIEL and uses a dental device; he previously responded for a few years after uvulectomy. [de-identified] : DLBCL, GCB type [FreeTextEntry1] : R-CHOP plus Onpro (2x cycles RCHOP21, then 4x cycles RCHOP14 due to interruption of therapy with lymphoma related SBO [de-identified] : Small bowel DLBCL, GCB type, +BCL-2 rearr, (-) MYC, BCL-6 rearr Bulky small bowel mass at presentation with PET/CT c/w peritoneal, pleural surface implants  Completed 6 cycles R-CHOP in March 2020 >>> complete remission by PET and CT criteria. All imaging since completing therapy indicates complete response Sept 2022 CT abd showed area of concerning small bowel thickening but subsequent PET showed no FDG uptake No concerning GI c/o no constitutional c/o Peripheral neuropathy fluctuating -still only bothers him at night.    gait remains normal Post herpetic pain -resolved, now off cymbalta

## 2024-06-22 NOTE — ASSESSMENT
[Future Reassessment of Pain Scale] : Future reassessment of pain scale    [FreeTextEntry1] : DLBCL, GBC type non-DH. treated with R-CHOP x 6 with last 3 cycles given on a q14d schedule post periop delay due to SBO with SB resection.  PET/CT post therapy 5/2020: Resolution of intraluminal small bowel foci ([place] 1)  CT C/A/P 10/31/20, 5/1/21, 11/9/21 [facility]  Post- herpetic neuralgia resolved  PN mild, but persistent only at night, no motor loss CBC results reviewed and d/w pt , diff nl except for persistent lymphocytopenia Hgb never fully normalized. Recent haptoglobin, retic, iron studies, B12 and immunoelectrophoresis nl no evidence of chronic inflammatory process  colonoscopy in 2019 normal, no GI c/o, melena, hematochezia for  repeat in 2024  Check CMP, LDH, beta 2 MG, LDH plan no further imaging unless there is a clinical indication  RV 6 mos .   [Curative] : Goals of care discussed with patient: Curative [Palliative Care Plan] : not applicable at this time

## 2024-06-22 NOTE — REVIEW OF SYSTEMS
[Negative] : Allergic/Immunologic [FreeTextEntry5] : saw card tuesday stable  [de-identified] : feet neuropathy tingling / throbbing walking doesn't feel [de-identified] : feels cold r/t anemia

## 2024-07-03 NOTE — PATIENT PROFILE ADULT - NSPROGENSOURCEINFO_GEN_A_NUR
Detail Level: Detailed Quality 226: Preventive Care And Screening: Tobacco Use: Screening And Cessation Intervention: Patient screened for tobacco use and is an ex/non-smoker patient

## 2024-08-29 NOTE — H&P PST ADULT - BACK
No deformity or limitation of movement Anticipated Starting Dosage (Optional): 40mg Daily Detail Level: Zone Ipledge Number (Optional): [id number] Patient Reported Weight (Optional - Include Units): 130

## 2024-09-03 NOTE — PROVIDER CONTACT NOTE (OTHER) - ASSESSMENT
Quality 110: Preventive Care And Screening: Influenza Immunization: Influenza Immunization Administered during Influenza season
Detail Level: Detailed
Pt AOx4. Pt not in any distress. Denies pain.
Quality 130: Documentation Of Current Medications In The Medical Record: Current Medications Documented
Quality 431: Preventive Care And Screening: Unhealthy Alcohol Use - Screening: Patient not identified as an unhealthy alcohol user when screened for unhealthy alcohol use using a systematic screening method
Quality 226: Preventive Care And Screening: Tobacco Use: Screening And Cessation Intervention: Patient screened for tobacco use and is an ex/non-smoker

## 2024-12-24 PROBLEM — F10.90 ALCOHOL USE: Status: ACTIVE | Noted: 2019-09-04

## 2025-01-10 ENCOUNTER — OUTPATIENT (OUTPATIENT)
Dept: OUTPATIENT SERVICES | Facility: HOSPITAL | Age: 75
LOS: 1 days | Discharge: HOME | End: 2025-01-10

## 2025-01-10 DIAGNOSIS — Z90.49 ACQUIRED ABSENCE OF OTHER SPECIFIED PARTS OF DIGESTIVE TRACT: Chronic | ICD-10-CM

## 2025-01-10 DIAGNOSIS — C83.30 DIFFUSE LARGE B-CELL LYMPHOMA, UNSPECIFIED SITE: ICD-10-CM

## 2025-01-13 ENCOUNTER — RESULT REVIEW (OUTPATIENT)
Age: 75
End: 2025-01-13

## 2025-01-13 ENCOUNTER — APPOINTMENT (OUTPATIENT)
Dept: HEMATOLOGY ONCOLOGY | Facility: CLINIC | Age: 75
End: 2025-01-13
Payer: MEDICARE

## 2025-01-13 VITALS
DIASTOLIC BLOOD PRESSURE: 59 MMHG | HEART RATE: 47 BPM | BODY MASS INDEX: 25.75 KG/M2 | SYSTOLIC BLOOD PRESSURE: 156 MMHG | WEIGHT: 149 LBS | TEMPERATURE: 97.1 F | OXYGEN SATURATION: 99 % | RESPIRATION RATE: 15 BRPM | HEIGHT: 63.94 IN

## 2025-01-13 DIAGNOSIS — C83.30 DIFFUSE LARGE B-CELL LYMPHOMA, UNSPECIFIED SITE: ICD-10-CM

## 2025-01-13 DIAGNOSIS — D64.9 ANEMIA, UNSPECIFIED: ICD-10-CM

## 2025-01-13 DIAGNOSIS — Z92.21 PERSONAL HISTORY OF ANTINEOPLASTIC CHEMOTHERAPY: ICD-10-CM

## 2025-01-13 DIAGNOSIS — G62.9 POLYNEUROPATHY, UNSPECIFIED: ICD-10-CM

## 2025-01-13 LAB
ALBUMIN SERPL ELPH-MCNC: 4.5 G/DL
ALP BLD-CCNC: 95 U/L
ALT SERPL-CCNC: 51 U/L
ANION GAP SERPL CALC-SCNC: 10 MMOL/L
AST SERPL-CCNC: 32 U/L
B2 MICROGLOB SERPL-MCNC: 2.3 MG/L
BASOPHILS # BLD AUTO: 0.02 K/UL — SIGNIFICANT CHANGE UP (ref 0–0.2)
BASOPHILS NFR BLD AUTO: 0.5 % — SIGNIFICANT CHANGE UP (ref 0–2)
BILIRUB SERPL-MCNC: 0.3 MG/DL
BUN SERPL-MCNC: 25 MG/DL
CALCIUM SERPL-MCNC: 9.6 MG/DL
CHLORIDE SERPL-SCNC: 107 MMOL/L
CO2 SERPL-SCNC: 24 MMOL/L
CREAT SERPL-MCNC: 1.15 MG/DL
EGFR: 67 ML/MIN/1.73M2
EOSINOPHIL # BLD AUTO: 0.23 K/UL — SIGNIFICANT CHANGE UP (ref 0–0.5)
EOSINOPHIL NFR BLD AUTO: 5.6 % — SIGNIFICANT CHANGE UP (ref 0–6)
GLUCOSE SERPL-MCNC: 127 MG/DL
HCT VFR BLD CALC: 38.8 % — LOW (ref 39–50)
HGB BLD-MCNC: 12.4 G/DL — LOW (ref 13–17)
IMM GRANULOCYTES NFR BLD AUTO: 0.5 % — SIGNIFICANT CHANGE UP (ref 0–0.9)
LDH SERPL-CCNC: 209 U/L
LYMPHOCYTES # BLD AUTO: 0.79 K/UL — LOW (ref 1–3.3)
LYMPHOCYTES # BLD AUTO: 19.3 % — SIGNIFICANT CHANGE UP (ref 13–44)
MCHC RBC-ENTMCNC: 28.4 PG — SIGNIFICANT CHANGE UP (ref 27–34)
MCHC RBC-ENTMCNC: 32 G/DL — SIGNIFICANT CHANGE UP (ref 32–36)
MCV RBC AUTO: 89 FL — SIGNIFICANT CHANGE UP (ref 80–100)
MONOCYTES # BLD AUTO: 0.48 K/UL — SIGNIFICANT CHANGE UP (ref 0–0.9)
MONOCYTES NFR BLD AUTO: 11.7 % — SIGNIFICANT CHANGE UP (ref 2–14)
NEUTROPHILS # BLD AUTO: 2.56 K/UL — SIGNIFICANT CHANGE UP (ref 1.8–7.4)
NEUTROPHILS NFR BLD AUTO: 62.4 % — SIGNIFICANT CHANGE UP (ref 43–77)
NRBC # BLD: 0 /100 WBCS — SIGNIFICANT CHANGE UP (ref 0–0)
NRBC BLD-RTO: 0 /100 WBCS — SIGNIFICANT CHANGE UP (ref 0–0)
PLATELET # BLD AUTO: 220 K/UL — SIGNIFICANT CHANGE UP (ref 150–400)
POTASSIUM SERPL-SCNC: 5.2 MMOL/L
PROT SERPL-MCNC: 6.8 G/DL
RBC # BLD: 4.36 M/UL — SIGNIFICANT CHANGE UP (ref 4.2–5.8)
RBC # FLD: 14 % — SIGNIFICANT CHANGE UP (ref 10.3–14.5)
SODIUM SERPL-SCNC: 141 MMOL/L
WBC # BLD: 4.1 K/UL — SIGNIFICANT CHANGE UP (ref 3.8–10.5)
WBC # FLD AUTO: 4.1 K/UL — SIGNIFICANT CHANGE UP (ref 3.8–10.5)

## 2025-01-13 PROCEDURE — G2211 COMPLEX E/M VISIT ADD ON: CPT

## 2025-01-13 PROCEDURE — 99213 OFFICE O/P EST LOW 20 MIN: CPT

## 2025-01-14 LAB
DEPRECATED KAPPA LC FREE/LAMBDA SER: 1.99 RATIO
IGA SER QL IEP: 116 MG/DL
IGG SER QL IEP: 745 MG/DL
IGM SER QL IEP: 59 MG/DL
KAPPA LC CSF-MCNC: 1.51 MG/DL
KAPPA LC SERPL-MCNC: 3.01 MG/DL

## 2025-01-18 RX ORDER — TADALAFIL 10 MG/1
10 TABLET, FILM COATED ORAL
Qty: 12 | Refills: 0 | Status: ACTIVE | COMMUNITY
Start: 2023-12-22

## 2025-02-13 NOTE — ED ADULT NURSE NOTE - CHPI ED NUR SYMPTOMS NEG
During this assessment, the patient stated that he does not "drink as heavy" as he did in the past, later explaining that he no longer drinks daily. He last drank 2 days prior to hospital admission and drank 1/2 gallon of vodka.  no blood in stool/no burning urination/no hematuria/no fever/no chills/no diarrhea/no dysuria